# Patient Record
Sex: FEMALE | Employment: OTHER | URBAN - METROPOLITAN AREA
[De-identification: names, ages, dates, MRNs, and addresses within clinical notes are randomized per-mention and may not be internally consistent; named-entity substitution may affect disease eponyms.]

---

## 2017-01-04 ENCOUNTER — HOSPITAL ENCOUNTER (OUTPATIENT)
Dept: PHYSICAL THERAPY | Age: 67
Discharge: HOME | End: 2017-01-04
Payer: MEDICARE

## 2017-01-04 PROCEDURE — 97140 MANUAL THERAPY 1/> REGIONS: CPT

## 2017-01-04 PROCEDURE — 97110 THERAPEUTIC EXERCISES: CPT

## 2017-01-04 NOTE — PROGRESS NOTES
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 OBJECTIVE    Modality rationale: decrease pain to improve the patients ability to perform ADLs.     Min Type Additional Details    [] Estim:  []Unatt       []IFC  []Premod                        []Other:  []w/ice   []w/heat  Position:  Location:    [] Estim: []Att    []TENS instruct  []NMES                    []Other:  []w/US   []w/ice   []w/heat  Position:  Location:    []  Traction: [] Cervical       []Lumbar                       [] Prone          []Supine                       []Intermittent   []Continuous Lbs:  [] before manual  [] after manual    []  Ultrasound: []Continuous   [] Pulsed                           []1MHz   []3MHz W/cm2:  Location:    []  Iontophoresis with dexamethasone         Location: [] Take home patch   [] In clinic   10 [x]  Ice     []  heat  []  Ice massage  []  Laser   []  Anodyne Position: L SL  Location: R hip    []  Laser with stim  []  Other:  Position:  Location:    []  Vasopneumatic Device Pressure:       [] lo [] med [] hi   Temperature: [] lo [] med [] hi   [] Skin assessment post-treatment:  []intact []redness- no adverse reaction    []redness  adverse reaction:     39 min Therapeutic Exercise:  [x] See flow sheet : Rationale: increase ROM and increase strength to improve the patients ability to perform ADLs. 12 min Manual Therapy:  Per flow sheet   Rationale: decrease pain, increase ROM and increase tissue extensibility to perform ADLs. With   [] TE   [] TA   [] neuro   [] other: Patient Education: [x] Review HEP    [] Progressed/Changed HEP based on:   [] positioning   [] body mechanics   [] transfers   [] heat/ice application    [] other:      Other Objective/Functional Measures:  (-) Ely's R     Pain Level (0-10 scale) post treatment: 0/10    ASSESSMENT/Changes in Function: Cont per POC. Patient will continue to benefit from skilled PT services to modify and progress therapeutic interventions, address functional mobility deficits, address ROM deficits and address strength deficits to attain remaining goals. []  See Plan of Care  []  See progress note/recertification  []  See Discharge Summary         Progress towards goals / Updated goals:  Short Term Goals: To be accomplished in 1 weeks:  1. Pt will be independent and complaint w/ HEP to progress gains in PT. At eval: initiated HEP  Current: met,per pt report. 12/28/16   Long Term Goals: To be accomplished in 6 weeks:  1. Pt will improve FOTO to > or = to 69 to demo improved function. At eval: FOTO = 64  2. Pt will improve B Gmax MMT to > or = to 4/5 for ease w/ stability w/ workout classes. At eval: MMT B Gmax = 3/5  3. Pt will improve flexibility as demo'd by - ely's R to decrease pain w/ walking. At eval: + ely's B  Current: Goal met. 1/4/17  4. Pt will report > or = to 60% improvement in symptoms for ease w/ return to normal ADLs and gym routine.    At eval: 0%    PLAN  []  Upgrade activities as tolerated     [x]  Continue plan of care  []  Update interventions per flow sheet       []  Discharge due to:_  []  Other:_      Ernestina Caruso, ANNAMARIA 1/4/2017  7:12 AM    Future Appointments  Date Time Provider Meghna Moreno   1/6/2017 4:30 PM Alirio Cohn, PT [facility]   1/9/2017 3:30 PM Ernestina E Laws, PTA [facility]   1/10/2017 2:45 PM Eduarda Palm [place] [id number]   1/11/2017 3:30 PM Ernestina E Laws, PTA [facility]   1/17/2017 4:30 PM Ernestina E Laws, PTA [facility]   1/19/2017 5:00 PM Alirio Cohn, PT [facility]   1/23/2017 4:30 PM Ernestina E Laws, PTA [facility]   1/27/2017 12:00 PM Alirio Cohn, PT [facility]

## 2017-01-06 ENCOUNTER — HOSPITAL ENCOUNTER (OUTPATIENT)
Dept: PHYSICAL THERAPY | Age: 67
Discharge: HOME | End: 2017-01-06
Payer: MEDICARE

## 2017-01-06 PROCEDURE — 97140 MANUAL THERAPY 1/> REGIONS: CPT | Performed by: PHYSICAL THERAPIST

## 2017-01-06 PROCEDURE — 97110 THERAPEUTIC EXERCISES: CPT | Performed by: PHYSICAL THERAPIST

## 2017-01-06 NOTE — PROGRESS NOTES
PT DAILY TREATMENT NOTE - [facility]     Patient Name: Margarita Foote  Date:2017  : 1950  [x]  Patient  Verified  Payor: Angelito Parks / Plan: [place] MEDICARE PART A & B / Product Type: Medicare /    In time:433  Out time:527  Total Treatment Time (min): 54  Total Timed Codes (min): 44  1:1 Treatment Time ( [address] only): 44   Visit #: 6 of 12    Treatment Area:  Other specified enthesopathies of right lower limb, excluding foot [M76.342]    SUBJECTIVE  Pain Level (0-10 scale): 1/10  Any medication changes, allergies to medications, adverse drug reactions, diagnosis change, or new procedure performed?: [x] No    [] Yes (see summary sheet for update)  Subjective functional status/changes:   [] No changes reported  Pt reports pain is less and more of just an annoyance and it's less often    OBJECTIVE    Modality rationale: decrease pain and increase tissue extensibility to improve the patients ability to improve mobility    Min Type Additional Details    [] Estim:  []Unatt       []IFC  []Premod                        []Other:  []w/ice   []w/heat  Position:  Location:    [] Estim: []Att    []TENS instruct  []NMES                    []Other:  []w/US   []w/ice   []w/heat  Position:  Location:    []  Traction: [] Cervical       []Lumbar                       [] Prone          []Supine                       []Intermittent   []Continuous Lbs:  [] before manual  [] after manual    []  Ultrasound: []Continuous   [] Pulsed                           []1MHz   []3MHz W/cm2:  Location:    []  Iontophoresis with dexamethasone         Location: [] Take home patch   [] In clinic   10 []  Ice     [x]  heat  []  Ice massage  []  Laser   []  Anodyne Position: L s/l  Location: R hip    []  Laser with stim  []  Other:  Position:  Location:    []  Vasopneumatic Device Pressure:       [] lo [] med [] hi   Temperature: [] lo [] med [] hi   [] Skin assessment post-treatment:  []intact []redness- no adverse reaction    []redness  adverse reaction:      min []Eval                  []Re-Eval       36 min Therapeutic Exercise:  [x] See flow sheet : increased per flow sheet   Rationale: increase ROM, increase strength, improve coordination and increase proprioception to improve the patients ability to improve mobility and function      min Therapeutic Activity:  []  See flow sheet :   Rationale:   to improve the patients ability to       min Neuromuscular Re-education:  []  See flow sheet :   Rationale:   to improve the patients ability to     8 min Manual Therapy:  TPR/DTM to R hip flex, TFL, glut med and min, vastus lateralis, manual hip flex and quad str along with prone hip IR stretch    Rationale: decrease pain, increase ROM, increase tissue extensibility and decrease trigger points to improve mobility and function      min Gait Training:  ___ feet with ___ device on level surfaces with ___ level of assist   Rationale: With   [] TE   [] TA   [] neuro   [] other: Patient Education: [x] Review HEP    [] Progressed/Changed HEP based on:   [] positioning   [] body mechanics   [] transfers   [] heat/ice application    [] other:      Other Objective/Functional Measures:      Pain Level (0-10 scale) post treatment: 2/10    ASSESSMENT/Changes in Function: Pt is making good progress, continue to increased and progress to tolerance. Patient will continue to benefit from skilled PT services to modify and progress therapeutic interventions, address functional mobility deficits, address strength deficits, analyze and address soft tissue restrictions, analyze and cue movement patterns and instruct in home and community integration to attain remaining goals. []  See Plan of Care  []  See progress note/recertification  []  See Discharge Summary         Progress towards goals / Updated goals:  Short Term Goals: To be accomplished in 1 weeks:  1. Pt will be independent and complaint w/ HEP to progress gains in PT.    At eval: initiated HEP  Current: met,per pt report. [id number]   Long Term Goals: To be accomplished in 6 weeks:  1. Pt will improve FOTO to > or = to 69 to demo improved function. At eval: FOTO = 64  Current: progressing, FOTO = 66 1/6/17  2. Pt will improve B Gmax MMT to > or = to 4/5 for ease w/ stability w/ workout classes. At eval: MMT B Gmax = 3/5  3. Pt will improve flexibility as demo'd by - ely's R to decrease pain w/ walking. At eval: + ely's B  4. Pt will report > or = to 60% improvement in symptoms for ease w/ return to normal ADLs and gym routine.    At eval: 0%  Current: met, pt reports 80% improvement especially over the last 2 days 1/6/17    PLAN  []  Upgrade activities as tolerated     [x]  Continue plan of care  []  Update interventions per flow sheet       []  Discharge due to:_  []  Other:_      Campbell Hurt, PT 1/6/2017  4:45 PM    Future Appointments  Date Time Provider Meghna Moreno   1/9/2017 3:30 PM Ernestina Manriquez PTA [facility]   1/10/2017 2:45 PM Britta Teran MD [id number]   1/11/2017 3:30 PM Ernestina Caruso PTA [facility]   1/17/2017 4:30 PM Ernestina Caruso PTA [facility]   1/19/2017 5:00 PM Russell Amor, HERRERA [facility]   1/23/2017 4:30 PM Ernestina Caruso PTA [facility]   1/27/2017 12:00 PM Russell Amor, PT [facility]

## 2017-01-09 ENCOUNTER — APPOINTMENT (OUTPATIENT)
Dept: PHYSICAL THERAPY | Age: 67
End: 2017-01-09
Payer: MEDICARE

## 2017-01-17 ENCOUNTER — HOSPITAL ENCOUNTER (OUTPATIENT)
Dept: PHYSICAL THERAPY | Age: 67
Discharge: HOME | End: 2017-01-17
Payer: MEDICARE

## 2017-01-17 PROCEDURE — 97140 MANUAL THERAPY 1/> REGIONS: CPT

## 2017-01-17 PROCEDURE — 97110 THERAPEUTIC EXERCISES: CPT

## 2017-01-17 NOTE — PROGRESS NOTES
PT DAILY TREATMENT NOTE - [facility]     Patient Name: Hira Sanchez  Date:2017  : 1950  [x]  Patient  Verified  Payor: Anjelica Short / Plan: [place] MEDICARE PART A & B / Product Type: Medicare /    In time: 4:41  Out time:5:28  Total Treatment Time (min): 47  Total Timed Codes (min): 37  1:1 Treatment Time ( [address] only): 30   Visit #: 7 of 12    Treatment Area: Other specified enthesopathies of right lower limb, excluding foot [M76.890]    SUBJECTIVE  Pain Level (0-10 scale): 3/10  Any medication changes, allergies to medications, adverse drug reactions, diagnosis change, or new procedure performed?: [x] No    [] Yes (see summary sheet for update)  Subjective functional status/changes:   [] No changes reported  Pt states the hip pain she originally came in for is much better, but now she is having pain through her R lower butt cheek. OBJECTIVE    Modality rationale: decrease pain to improve the patients ability to increase ease of ADLs.     Min Type Additional Details    [] Estim:  []Unatt       []IFC  []Premod                        []Other:  []w/ice   []w/heat  Position:  Location:    [] Estim: []Att    []TENS instruct  []NMES                    []Other:  []w/US   []w/ice   []w/heat  Position:  Location:    []  Traction: [] Cervical       []Lumbar                       [] Prone          []Supine                       []Intermittent   []Continuous Lbs:  [] before manual  [] after manual    []  Ultrasound: []Continuous   [] Pulsed                           []1MHz   []3MHz W/cm2:  Location:    []  Iontophoresis with dexamethasone         Location: [] Take home patch   [] In clinic   10 [x]  Ice     []  heat  []  Ice massage  []  Laser   []  Anodyne Position: prone  Location: R glut    []  Laser with stim  []  Other:  Position:  Location:    []  Vasopneumatic Device Pressure:       [] lo [] med [] hi   Temperature: [] lo [] med [] hi   [] Skin assessment post-treatment:  []intact []redness- no adverse reaction    []redness  adverse reaction:     27 min Therapeutic Exercise:  [x] See flow sheet :   Rationale: increase ROM and increase strength to improve the patients ability to perform ADLs. 10 min Manual Therapy:  Hip flex S, STM to R glut   Rationale: decrease pain, increase ROM, increase tissue extensibility and decrease trigger points to increase ease of ADLs. With   [] TE   [] TA   [] neuro   [] other: Patient Education: [x] Review HEP    [] Progressed/Changed HEP based on:   [] positioning   [] body mechanics   [] transfers   [] heat/ice application    [] other:      Other Objective/Functional Measures: (-) Ely's B.  B Glut max MMT 4-/5. Pain Level (0-10 scale) post treatment: 2/10    ASSESSMENT/Changes in Function: Cont per POC    Patient will continue to benefit from skilled PT services to modify and progress therapeutic interventions, address functional mobility deficits, address ROM deficits and address strength deficits to attain remaining goals. []  See Plan of Care  []  See progress note/recertification  []  See Discharge Summary         Progress towards goals / Updated goals:  Short Term Goals: To be accomplished in 1 weeks:  1. Pt will be independent and complaint w/ HEP to progress gains in PT. At eval: initiated HEP  Current: met,per pt report. [id number]   Long Term Goals: To be accomplished in 6 weeks:  1. Pt will improve FOTO to > or = to 69 to demo improved function. At eval: FOTO = 64  Current: progressing, FOTO = 66 1/6/17  2. Pt will improve B Gmax MMT to > or = to 4/5 for ease w/ stability w/ workout classes. At eval: MMT B Gmax = 3/5  Current: Progressing, 4-/5 B 1/17/17  3. Pt will improve flexibility as demo'd by - ely's R to decrease pain w/ walking. At eval: + ely's B  Current: Goal met,  (-) B.  1/17/17  4. Pt will report > or = to 60% improvement in symptoms for ease w/ return to normal ADLs and gym routine.    At eval: 0%  Current: met, pt reports 80% improvement especially over the last 2 days 1/6/17    PLAN  []  Upgrade activities as tolerated     [x]  Continue plan of care  []  Update interventions per flow sheet       []  Discharge due to:_  []  Other:_      Ernestina Caruso PTA 1/17/2017  5:23 PM    Future Appointments  Date Time Provider Meghna Moreno   1/19/2017 5:00 PM Jed Moctezuma, PT [facility]   1/23/2017 4:30 PM Ernestina Caruso PTA [facility]   1/27/2017 12:00 PM Jed Moctezuma, PT [facility]

## 2017-01-19 ENCOUNTER — HOSPITAL ENCOUNTER (OUTPATIENT)
Dept: PHYSICAL THERAPY | Age: 67
Discharge: HOME | End: 2017-01-19
Payer: MEDICARE

## 2017-01-19 PROCEDURE — 97140 MANUAL THERAPY 1/> REGIONS: CPT

## 2017-01-19 PROCEDURE — 97110 THERAPEUTIC EXERCISES: CPT

## 2017-01-19 PROCEDURE — G8978 MOBILITY CURRENT STATUS: HCPCS

## 2017-01-19 PROCEDURE — G8979 MOBILITY GOAL STATUS: HCPCS

## 2017-01-19 NOTE — PROGRESS NOTES
PT DAILY TREATMENT NOTE - [facility]     Patient Name: Elizabeth Mccloud  Date:2017  : 1950  [x]  Patient  Verified  Payor: Belen Last / Plan: [place] MEDICARE PART A & B / Product Type: Medicare /    In time:5:10  Out time:6:05  Total Treatment Time (min): 55  Total Timed Codes (min): 45  1:1 Treatment Time ([facility] only): 38   Visit #: 8 of 12    Treatment Area: Other specified enthesopathies of right lower limb, excluding foot [M76.891]    SUBJECTIVE  Pain Level (0-10 scale): 1.5  Any medication changes, allergies to medications, adverse drug reactions, diagnosis change, or new procedure performed?: [x] No    [] Yes (see summary sheet for update)  Subjective functional status/changes:   [] No changes reported  Pt reports 70% overall improvement.      OBJECTIVE    Modality rationale: decrease inflammation and decrease pain to improve the patients ability to perform ADL's   Min Type Additional Details    [] Estim:  []Unatt       []IFC  []Premod                        []Other:  []w/ice   []w/heat  Position:  Location:    [] Estim: []Att    []TENS instruct  []NMES                    []Other:  []w/US   []w/ice   []w/heat  Position:  Location:    []  Traction: [] Cervical       []Lumbar                       [] Prone          []Supine                       []Intermittent   []Continuous Lbs:  [] before manual  [] after manual    []  Ultrasound: []Continuous   [] Pulsed                           []1MHz   []3MHz W/cm2:  Location:    []  Iontophoresis with dexamethasone         Location: [] Take home patch   [] In clinic   10 [x]  Ice     []  heat  []  Ice massage  []  Laser   []  Anodyne Position: L S/L  Location: R hip    []  Laser with stim  []  Other:  Position:  Location:    []  Vasopneumatic Device Pressure:       [] lo [] med [] hi   Temperature: [] lo [] med [] hi   [] Skin assessment post-treatment:  []intact []redness- no adverse reaction    []redness  adverse reaction:      min []Eval []Re-Eval       35 min Therapeutic Exercise:  [x] See flow sheet :   Rationale: increase ROM, increase strength, improve coordination and improve balance to improve the patients ability to decrease pain and improve activity tolerance      min Therapeutic Activity:  []  See flow sheet :   Rationale:   to improve the patients ability to       min Neuromuscular Re-education:  []  See flow sheet :   Rationale:   to improve the patients ability to     10 min Manual Therapy:  Per flow sheet   Rationale: decrease pain, increase ROM, increase tissue extensibility and decrease trigger points to increase ease with ADL's     min Gait Training:  ___ feet with ___ device on level surfaces with ___ level of assist   Rationale: With   [] TE   [] TA   [] neuro   [] other: Patient Education: [x] Review HEP    [] Progressed/Changed HEP based on:   [] positioning   [] body mechanics   [] transfers   [] heat/ice application    [] other:      Other Objective/Functional Measures: see goals below     Pain Level (0-10 scale) post treatment: 1.5    ASSESSMENT/Changes in Function: cont per POC    Patient will continue to benefit from skilled PT services to modify and progress therapeutic interventions, address functional mobility deficits, address ROM deficits, address strength deficits, analyze and address soft tissue restrictions, analyze and cue movement patterns and instruct in home and community integration to attain remaining goals. []  See Plan of Care  []  See progress note/recertification  []  See Discharge Summary         Progress towards goals / Updated goals:  Short Term Goals: To be accomplished in 1 weeks:  1. Pt will be independent and complaint w/ HEP to progress gains in PT. At eval: initiated HEP  Current: met,per pt report. 12/27/16   Long Term Goals: To be accomplished in 6 weeks:  1. Pt will improve FOTO to > or = to 69 to demo improved function.    At eval: FOTO = 64  Current: progressing, FOTO = 66 1/6/17  2. Pt will improve B Gmax MMT to > or = to 4/5 for ease w/ stability w/ workout classes. At eval: MMT B Gmax = 3/5  Current: Progressing, 4-/5 B 1/17/17  3. Pt will improve flexibility as demo'd by - ely's R to decrease pain w/ walking. At eval: + ely's B  Current: Goal met, (-) B. 1/17/17  4. Pt will report > or = to 60% improvement in symptoms for ease w/ return to normal ADLs and gym routine.    At eval: 0%  Current: met, pt reports 70% improvement overall (1/19/17)       PLAN  [x]  Upgrade activities as tolerated     [x]  Continue plan of care  []  Update interventions per flow sheet       []  Discharge due to:_  []  Other:_      Mamadou Lockwood, PT 1/19/2017  5:38 PM    Future Appointments  Date Time Provider Meghna Moreno   1/23/2017 4:30 PM Yolanda Walker PTA [facility]   1/27/2017 12:00 PM Mamadou Lockwood PT [facility]

## 2017-01-19 NOTE — PROGRESS NOTES
[facility]              [address]        [place], [address] [place]   [phone] fax    Medicare Progress Report    Patient name: Macho Jolley Start of Care: 2016   Referral source: Odessa Goodpasture, MD : 1950   Medical/Treatment Diagnosis: Other specified enthesopathies of right lower limb, excluding foot [M76.891] Onset Date:~2 months prior to I.E. Prior Hospitalization: see medical history Provider#: [id number]   Medications: Verified on Patient Summary List    Comorbidities: allergies, arthritis, back pain, GI disease, osteoporosis, prior surgery  Prior Level of Function: Independent w/ ADLs and active with gym program   Visits from Start of Care: 8    Missed Visits: 1    Reporting Period: 2016 to 2017    Subjective Reports: Pt reports 70% improvement overall    Key functional changes:  Progress towards goals / Updated goals:  Short Term Goals: To be accomplished in 1 weeks:  1. Pt will be independent and complaint w/ HEP to progress gains in PT. At eval: initiated HEP  Current: met,per pt report. Long Term Goals: To be accomplished in 6 weeks:  1. Pt will improve FOTO to > or = to 69 to demo improved function. At eval: FOTO = 64  Current: progressing, FOTO = 66   2. Pt will improve B Gmax MMT to > or = to 4/5 for ease w/ stability w/ workout classes. At eval: MMT B Gmax = 3/5  Current: Progressing, 4-/5 B   3. Pt will improve flexibility as demo'd by - ely's R to decrease pain w/ walking. At eval: + ely's B  Current: Goal met, (-) B.   4. Pt will report > or = to 60% improvement in symptoms for ease w/ return to normal ADLs and gym routine. At eval: 0%  Current: met, pt reports 70% improvement overall      Pt has progressed well with PT, reporting 70% improvement since [facility]. Pt has demonstrated improvements with B hip MMT and flexibility.        Assessment / Recommendations: Patient will continue to benefit from skilled PT services to modify and progress therapeutic interventions, address functional mobility deficits, address ROM deficits, address strength deficits, analyze and address soft tissue restrictions, analyze and cue movement patterns and instruct in home and community integration to attain remaining goals. Problem List: pain affecting function, decrease ROM, decrease strength, impaired gait/ balance, decrease ADL/ functional abilitiies, decrease activity tolerance and decrease flexibility/ joint mobility   Treatment Plan: Therapeutic exercise, Therapeutic activities, Neuromuscular re-education, Physical agent/modality, Gait/balance training, Manual therapy and Patient education    Updated Goals to be accomplished in 3 weeks:  Continue with above unmet goals    Frequency / Duration: Patient to be seen 2 times per week for 3 weeks:    G-Codes (GP)  Mobility  [id number] Current  CJ= 20-39%   Goal  CJ= 20-39%    The severity rating is based on clinical judgment and the FOTO score.       Manish Thurston, PT 1/19/2017 5:57 PM

## 2017-01-23 ENCOUNTER — HOSPITAL ENCOUNTER (OUTPATIENT)
Dept: PHYSICAL THERAPY | Age: 67
Discharge: HOME | End: 2017-01-23
Payer: MEDICARE

## 2017-01-23 PROCEDURE — 97110 THERAPEUTIC EXERCISES: CPT

## 2017-01-23 PROCEDURE — 97140 MANUAL THERAPY 1/> REGIONS: CPT

## 2017-01-23 NOTE — PROGRESS NOTES
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 OBJECTIVE    Modality rationale: decrease pain to improve the patients ability to perform ADLs.     Min Type Additional Details    [] Estim:  []Unatt       []IFC  []Premod                        []Other:  []w/ice   []w/heat  Position:  Location:    [] Estim: []Att    []TENS instruct  []NMES                    []Other:  []w/US   []w/ice   []w/heat  Position:  Location:    []  Traction: [] Cervical       []Lumbar                       [] Prone          []Supine                       []Intermittent   []Continuous Lbs:  [] before manual  [] after manual    []  Ultrasound: []Continuous   [] Pulsed                           []1MHz   []3MHz Location:  W/cm2:    []  Iontophoresis with dexamethasone         Location: [] Take home patch   [] In clinic   10 [x]  Ice     []  heat  []  Ice massage  []  Laser   []  Anodyne Position: supine  Location: R hip    []  Laser with stim  []  Other: Position:  Location:    []  Vasopneumatic Device Pressure:       [] lo [] med [] hi   Temperature: [] lo [] med [] hi   [] Skin assessment post-treatment:  []intact []redness- no adverse reaction    []redness  adverse reaction:     37 min Therapeutic Exercise:  [x] See flow sheet :   Rationale: increase ROM and increase strength to improve the patients ability to perform ADLs. 8 min Manual Therapy:  Per flow sheet   Rationale: decrease pain, increase ROM and increase tissue extensibility to increase ease of ADLs. With   [] TE   [] TA   [] neuro   [] other: Patient Education: [x] Review HEP    [] Progressed/Changed HEP based on:   [] positioning   [] body mechanics   [] transfers   [] heat/ice application    [] other:      Other Objective/Functional Measures: No TTP of gluts, piriformis or QL. Pain Level (0-10 scale) post treatment: 0/10    ASSESSMENT/Changes in Function: Cont per POC. Patient will continue to benefit from skilled PT services to modify and progress therapeutic interventions, address functional mobility deficits, address ROM deficits and address strength deficits to attain remaining goals. []  See Plan of Care  []  See progress note/recertification  []  See Discharge Summary         Progress towards goals / Updated goals:  Long Term Goals: To be accomplished in 6 weeks:  1. Pt will improve FOTO to > or = to 69 to demo improved function. At PN[de-identified] progressing, FOTO = 66 1/6/17  2. Pt will improve B Gmax MMT to > or = to 4/5 for ease w/ stability w/ workout classes.    At PN: Progressing, 4-/5 B 1/17/17      PLAN  []  Upgrade activities as tolerated     [x]  Continue plan of care  []  Update interventions per flow sheet       []  Discharge due to:_  []  Other:_      Ernestina Caruso, PTA 1/23/2017  5:22 PM

## 2017-01-27 ENCOUNTER — HOSPITAL ENCOUNTER (OUTPATIENT)
Dept: PHYSICAL THERAPY | Age: 67
Discharge: HOME | End: 2017-01-27
Payer: MEDICARE

## 2017-01-27 PROCEDURE — 97140 MANUAL THERAPY 1/> REGIONS: CPT

## 2017-01-27 PROCEDURE — 97110 THERAPEUTIC EXERCISES: CPT

## 2017-01-27 NOTE — PROGRESS NOTES
PT DAILY TREATMENT NOTE - [facility]     Patient Name: Marva Cuevas  Date:2017  : 1950  [x]  Patient  Verified  Payor: Jinny Lanier / Plan: [place] MEDICARE PART A & B / Product Type: Medicare /    In time: 4:30  Out time:5:22  Total Treatment Time (min): 52  Total Timed Codes (min): 42  1:1 Treatment Time ( [address] only): 42   Visit #: 2 of 6    Treatment Area: Other specified enthesopathies of right lower limb, excluding foot [M76.075]    SUBJECTIVE  Pain Level (0-10 scale): 0  Any medication changes, allergies to medications, adverse drug reactions, diagnosis change, or new procedure performed?: [x] No    [] Yes (see summary sheet for update)  Subjective functional status/changes:   [] No changes reported  Pt reports no current pain today.      OBJECTIVE    Modality rationale: decrease inflammation and decrease pain to improve the patients ability to perform ADL's   Min Type Additional Details    [] Estim:  []Unatt       []IFC  []Premod                        []Other:  []w/ice   []w/heat  Position:  Location:    [] Estim: []Att    []TENS instruct  []NMES                    []Other:  []w/US   []w/ice   []w/heat  Position:  Location:    []  Traction: [] Cervical       []Lumbar                       [] Prone          []Supine                       []Intermittent   []Continuous Lbs:  [] before manual  [] after manual    []  Ultrasound: []Continuous   [] Pulsed                           []1MHz   []3MHz W/cm2:  Location:    []  Iontophoresis with dexamethasone         Location: [] Take home patch   [] In clinic   10 [x]  Ice     []  heat  []  Ice massage  []  Laser   []  Anodyne Position: L S/L  Location: R hip    []  Laser with stim  []  Other:  Position:  Location:    []  Vasopneumatic Device Pressure:       [] lo [] med [] hi   Temperature: [] lo [] med [] hi   [] Skin assessment post-treatment:  []intact []redness- no adverse reaction    []redness  adverse reaction:      min []Eval                  []Re-Eval 32 min Therapeutic Exercise:  [x] See flow sheet :   Rationale: increase ROM, increase strength and improve coordination to improve the patients ability to decrease pain and improve activity tolerance      min Therapeutic Activity:  []  See flow sheet :   Rationale:   to improve the patients ability to       min Neuromuscular Re-education:  []  See flow sheet :   Rationale:   to improve the patients ability to     10 min Manual Therapy:  Per flow sheet   Rationale: decrease pain, increase ROM, increase tissue extensibility and decrease trigger points to increase ease with ADL's      min Gait Training:  ___ feet with ___ device on level surfaces with ___ level of assist   Rationale: With   [] TE   [] TA   [] neuro   [] other: Patient Education: [x] Review HEP    [] Progressed/Changed HEP based on:   [] positioning   [] body mechanics   [] transfers   [] heat/ice application    [] other:      Other Objective/Functional Measures: glute max 4-/5 B     Pain Level (0-10 scale) post treatment: 1    ASSESSMENT/Changes in Function: cont per POC    Patient will continue to benefit from skilled PT services to modify and progress therapeutic interventions, address functional mobility deficits, address strength deficits, analyze and address soft tissue restrictions, analyze and cue movement patterns and instruct in home and community integration to attain remaining goals. []  See Plan of Care  []  See progress note/recertification  []  See Discharge Summary         Progress towards goals / Updated goals:  Long Term Goals: To be accomplished in 6 weeks:  1. Pt will improve FOTO to > or = to 69 to demo improved function. At PN[de-identified] progressing, FOTO = 66 1/6/17  2. Pt will improve B Gmax MMT to > or = to 4/5 for ease w/ stability w/ workout classes.    At PN: Progressing, 4-/5 B 1/17/17  Current: same as above (1/27/17)    PLAN  [x]  Upgrade activities as tolerated     [x]  Continue plan of care  []  Update interventions per flow sheet       []  Discharge due to:_  []  Other:_      Camelia Swenson, PT 1/27/2017  4:33 PM    No future appointments.

## 2017-02-02 ENCOUNTER — HOSPITAL ENCOUNTER (OUTPATIENT)
Dept: PHYSICAL THERAPY | Age: 67
Discharge: HOME | End: 2017-02-02
Payer: MEDICARE

## 2017-02-02 PROCEDURE — 97140 MANUAL THERAPY 1/> REGIONS: CPT

## 2017-02-02 PROCEDURE — 97110 THERAPEUTIC EXERCISES: CPT

## 2017-02-02 NOTE — PROGRESS NOTES
PT DAILY TREATMENT NOTE - [facility]     Patient Name: Jose Alvarado  Date:2017  : 1950  [x]  Patient  Verified  Payor: [place] MEDICARE / Plan: [place] MEDICARE PART A & B / Product Type: Medicare /    In time:4:07  Out time:5:06  Total Treatment Time (min): 59  Total Timed Codes (min): 49  1:1 Treatment Time ( [address] only): 23   Visit #: 3 of 6    Treatment Area: Other specified enthesopathies of right lower limb, excluding foot [M76.348]    SUBJECTIVE  Pain Level (0-10 scale): 1  Any medication changes, allergies to medications, adverse drug reactions, diagnosis change, or new procedure performed?: [x] No    [] Yes (see summary sheet for update)  Subjective functional status/changes:   [] No changes reported  Pt reports she gets pain in her R hip when she lays on her left side. OBJECTIVE    Modality rationale: decrease pain to improve the patients ability to decrease difficulty while performing tasks.     Min Type Additional Details    [] Estim:  []Unatt       []IFC  []Premod                        []Other:  []w/ice   []w/heat  Position:  Location:    [] Estim: []Att    []TENS instruct  []NMES                    []Other:  []w/US   []w/ice   []w/heat  Position:  Location:    []  Traction: [] Cervical       []Lumbar                       [] Prone          []Supine                       []Intermittent   []Continuous Lbs:  [] before manual  [] after manual    []  Ultrasound: []Continuous   [] Pulsed                           []1MHz   []3MHz W/cm2:  Location:    []  Iontophoresis with dexamethasone         Location: [] Take home patch   [] In clinic   10 []  Ice     [x]  heat  []  Ice massage  []  Laser   []  Anodyne Position:sidelying  Location:R hip    []  Laser with stim  []  Other:  Position:  Location:    []  Vasopneumatic Device Pressure:       [] lo [] med [] hi   Temperature: [] lo [] med [] hi   [] Skin assessment post-treatment:  []intact []redness- no adverse reaction    []redness  adverse reaction: 39 min Therapeutic Exercise:  [x] See flow sheet :   Rationale: increase ROM and increase strength to improve the patients ability to increase tolerance to activities. 10 min Manual Therapy:  Per flow sheet   Rationale: decrease pain, increase ROM, increase tissue extensibility and decrease trigger points to increase ease with ADLs. With   [] TE   [] TA   [] neuro   [] other: Patient Education: [x] Review HEP    [] Progressed/Changed HEP based on:   [] positioning   [] body mechanics   [] transfers   [] heat/ice application    [] other:      Other Objective/Functional Measures: MT glue 4-/5 B     Pain Level (0-10 scale) post treatment: 1    ASSESSMENT/Changes in Function: Continue per POC. Patient will continue to benefit from skilled PT services to modify and progress therapeutic interventions, address functional mobility deficits, address ROM deficits, address strength deficits and analyze and address soft tissue restrictions to attain remaining goals. []  See Plan of Care  []  See progress note/recertification  []  See Discharge Summary         Progress towards goals / Updated goals:  Long Term Goals: To be accomplished in 6 weeks:  1. Pt will improve FOTO to > or = to 69 to demo improved function. At PN[de-identified] progressing, FOTO = 66 1/6/17  2. Pt will improve B Gmax MMT to > or = to 4/5 for ease w/ stability w/ workout classes.    At PN: Progressing, 4-/5 B 1/17/17  Current: same as above (2/2/17)    PLAN  []  Upgrade activities as tolerated     [x]  Continue plan of care  []  Update interventions per flow sheet       []  Discharge due to:_  []  Other:_      Mark Lane PTA 2/2/2017  4:26 PM    Future Appointments  Date Time Provider Meghna Moreno   2/9/2017 4:30 PM Jed Moctezuma, PT [facility]

## 2017-02-09 ENCOUNTER — APPOINTMENT (OUTPATIENT)
Dept: PHYSICAL THERAPY | Age: 67
End: 2017-02-09
Payer: MEDICARE

## 2017-02-15 ENCOUNTER — HOSPITAL ENCOUNTER (OUTPATIENT)
Dept: PHYSICAL THERAPY | Age: 67
Discharge: HOME | End: 2017-02-15
Payer: MEDICARE

## 2017-02-15 PROCEDURE — G8980 MOBILITY D/C STATUS: HCPCS | Performed by: PHYSICAL THERAPIST

## 2017-02-15 PROCEDURE — 97110 THERAPEUTIC EXERCISES: CPT

## 2017-02-15 PROCEDURE — 97140 MANUAL THERAPY 1/> REGIONS: CPT

## 2017-02-15 PROCEDURE — G8979 MOBILITY GOAL STATUS: HCPCS | Performed by: PHYSICAL THERAPIST

## 2017-02-15 NOTE — PROGRESS NOTES
PT DAILY TREATMENT NOTE - [facility]     Patient Name: Raynelle Boast  Date:2/15/2017  : 1950  [x]  Patient  Verified  Payor: Mariola Cabezas / Plan: [place] MEDICARE PART A & B / Product Type: Medicare /    In time:5:14  Out time:6:09  Total Treatment Time (min): 55  Total Timed Codes (min): 45  1:1 Treatment Time ( [address] only): 38   Visit #: 4 of 6    Treatment Area: Other specified enthesopathies of right lower limb, excluding foot [M76.895]    SUBJECTIVE  Pain Level (0-10 scale): 0  Any medication changes, allergies to medications, adverse drug reactions, diagnosis change, or new procedure performed?: [x] No    [] Yes (see summary sheet for update)  Subjective functional status/changes:   [] No changes reported  Pt reports she is back able to perform her normal exercise classes without much pain in her hip. OBJECTIVE    Modality rationale: decrease pain to improve the patients ability to decrease difficulty while performing tasks.     Min Type Additional Details    [] Estim:  []Unatt       []IFC  []Premod                        []Other:  []w/ice   []w/heat  Position:  Location:    [] Estim: []Att    []TENS instruct  []NMES                    []Other:  []w/US   []w/ice   []w/heat  Position:  Location:    []  Traction: [] Cervical       []Lumbar                       [] Prone          []Supine                       []Intermittent   []Continuous Lbs:  [] before manual  [] after manual    []  Ultrasound: []Continuous   [] Pulsed                           []1MHz   []3MHz W/cm2:  Location:    []  Iontophoresis with dexamethasone         Location: [] Take home patch   [] In clinic   10 [x]  Ice     []  heat  []  Ice massage  []  Laser   []  Anodyne Position:supine  Location:R hip    []  Laser with stim  []  Other:  Position:  Location:    []  Vasopneumatic Device Pressure:       [] lo [] med [] hi   Temperature: [] lo [] med [] hi   [] Skin assessment post-treatment:  []intact []redness- no adverse reaction []redness  adverse reaction:       37 min Therapeutic Exercise:  [x] See flow sheet :   Rationale: increase ROM and increase strength to improve the patients ability to increase tolerance to activities. 8 min Manual Therapy:  Per flow sheet   Rationale: decrease pain, increase ROM, increase tissue extensibility and decrease trigger points to increase ease with ADLs. With   [] TE   [] TA   [] neuro   [] other: Patient Education: [x] Review HEP    [] Progressed/Changed HEP based on:   [] positioning   [] body mechanics   [] transfers   [] heat/ice application    [] other:      Other Objective/Functional Measures: see goals. Pain Level (0-10 scale) post treatment: 0    ASSESSMENT/Changes in Function: Pt reports 85% improvement since [facility]. Pt has a FOTO score of 68, increased by 4 since [facility]. Pt B Gmax MMT is 4/5.          []  See Plan of Care  []  See progress note/recertification  [x]  See Discharge Summary         Progress towards goals / Updated goals:  Long Term Goals: To be accomplished in 6 weeks:  1. Pt will improve FOTO to > or = to 69 to demo improved function. At PN[de-identified] progressing, FOTO = 66 1/6/17  Current: Progressing: FOTO = 68, increased by 4 since [facility]. 2/15/17  2. Pt will improve B Gmax MMT to > or = to 4/5 for ease w/ stability w/ workout classes. At PN: Progressing, 4-/5 B 1/17/17  Current: Goal Met: Gmax B 4/5 2/15/17       PLAN  []  Upgrade activities as tolerated     []  Continue plan of care  []  Update interventions per flow sheet       [x]  Discharge due to:_Pt is able to perform HEP independently. []  Other:_      Caren Jalloh, PTA 2/15/2017  5:22 PM    No future appointments.

## 2017-02-16 NOTE — PROGRESS NOTES
[facility]              [address], [address] [place]   [phone] fax    Discharge Summary  Patient name: Dipika Carlson Start of Care: 16   Referral source: Ene Crain MD : 1950   Medical/Treatment Diagnosis: Other specified enthesopathies of right lower limb, excluding foot [M76.891] Onset Date: :~2 months prior to I.E. Prior Hospitalization: see medical history Provider#: [id number]   Medications: Verified on Patient Summary List    Comorbidities: allergies, arthritis, back pain, GI disease, osteoporosis, prior surgery  Prior Level of Function: Independent w/ ADLs and active with gym program   Visits from Start of Care: 12    Missed Visits: 1  Reporting Period : 16 to 2/15/17    Summary of Care:  Progress towards goals / Updated goals:  Long Term Goals: To be accomplished in 6 weeks:  1. Pt will improve FOTO to > or = to 69 to demo improved function. At PN[de-identified] progressing, FOTO = 66   Current: Progressing: FOTO = 68, increased by 4 since [facility]  2. Pt will improve B Gmax MMT to > or = to 4/5 for ease w/ stability w/ workout classes. At PN: Progressing, 4-/5 B   Current: Goal Met: Gmax B 4/5     Pt reports 85% improvement since [facility]. Pt has a FOTO score of 68, increased by 4 since [facility]. Pt B Gmax MMT is 4/5. Pt reports she is back to her normal gym program without much pain in her hip. DC to [facility] at this time. G-Codes (GP)  Mobility   [id number] Goal  CJ= 20-39%  D/C  CJ= 20-39%    The severity rating is based on clinical judgment and the FOTO Score score.     ASSESSMENT/RECOMMENDATIONS:  [x]Discontinue therapy: [x]Patient has reached or is progressing toward set goals      []Patient is non-compliant or has abdicated      []Due to lack of appreciable progress towards set Chad Salazar PT 2017 10:05 AM

## 2017-09-12 ENCOUNTER — OFFICE VISIT (OUTPATIENT)
Dept: ORTHOPEDIC SURGERY | Age: 67
End: 2017-09-12

## 2017-09-12 VITALS
HEART RATE: 77 BPM | DIASTOLIC BLOOD PRESSURE: 81 MMHG | OXYGEN SATURATION: 97 % | BODY MASS INDEX: 23.92 KG/M2 | SYSTOLIC BLOOD PRESSURE: 129 MMHG | HEIGHT: 63 IN | TEMPERATURE: 98.4 F | WEIGHT: 135 LBS

## 2017-09-12 DIAGNOSIS — M25.571 ACUTE RIGHT ANKLE PAIN: Primary | ICD-10-CM

## 2017-09-12 DIAGNOSIS — M79.671 RIGHT FOOT PAIN: ICD-10-CM

## 2017-09-12 NOTE — MR AVS SNAPSHOT
Visit Information Date & Time Provider Department Dept. Phone Encounter #  
 9/12/2017  2:00 PM Gayla Sharma, [address] [facility] [phone] [id number] Upcoming Health Maintenance Date Due Hepatitis C Screening 1950 DTaP/Tdap/Td series (1 - Tdap) 5/24/1972 BREAST CANCER SCRN MAMMOGRAM 5/24/2001 FOBT Q 1 YEAR AGE 50-75 5/24/2001 ZOSTER VACCINE AGE 60> 3/24/2011 GLAUCOMA SCREENING Q2Y 5/24/2016 OSTEOPOROSIS SCREENING (DEXA) 5/24/2016 MEDICARE YEARLY EXAM 5/24/2016 INFLUENZA AGE 9 TO ADULT 8/1/2017 Pneumococcal 65+ Low/Medium Risk (2 of 2 - PPSV23) 1/10/2018 Allergies as of 9/12/2017  Review Complete On: 9/12/2017 By: Gayla Sharma MD  
  
 Severity Noted Reaction Type Reactions Penicillin G  09/18/2015    Rash Current Immunizations  Never Reviewed No immunizations on file. Not reviewed this visit You Were Diagnosed With   
  
 Codes Comments Right foot pain    -  Primary ICD-10-CM: Y38.016 ICD-9-CM: 729.5 Acute right ankle pain     ICD-10-CM: M25.571 ICD-9-CM: 719.47, 338.19 Vitals BP Pulse Temp Height(growth percentile) Weight(growth percentile) SpO2  
 129/81 77 98.4 °F (36.9 °C) (Oral) 5' 3\" (1.6 m) 135 lb (61.2 kg) 97% BMI OB Status Smoking Status 23.91 kg/m2 Hysterectomy Never Smoker BMI and BSA Data Body Mass Index Body Surface Area  
 23.91 kg/m 2 1.65 m 2 Preferred Pharmacy Pharmacy Name Phone [facility] [address] [place] [address] Avenue [phone] Your Updated Medication List  
  
   
This list is accurate as of: 9/12/17  2:57 PM.  Always use your most recent med list.  
  
  
  
  
 alum-mag hydroxide-simeth 200-200-20 mg/5 mL Susp Commonly known as:  MYLANTA Take 30 mL by mouth every four (4) hours as needed. calcium carbonate 200 mg calcium (500 mg) Chew Commonly known as:  TUMS Take 1 Tab by mouth daily. CLARITIN 10 mg tablet Generic drug:  loratadine Take 10 mg by mouth daily. ZANTAC 150 mg tablet Generic drug:  raNITIdine Take 150 mg by mouth two (2) times a day. We Performed the Following AMB POC XRAY, FOOT; COMPLETE, 3+ VIEW [70453 CPT(R)] POC XRAY, ANKLE; 2 VIEWS [03943 CPT(R)] Patient Instructions Please follow up after MRI. You are advised to contact us if your condition worsens. An MRI or CT has been ordered for you. A [employer] will be contacting you to schedule the appointment as soon as it has been approved with your insurance company. Please schedule an appointment to follow up with the doctor or the physicians assistant after the MRI or CT has been conducted. Ankle: Exercises Your Care Instructions Here are some examples of exercises for your ankle. Start each exercise slowly. Ease off the exercise if you start to have pain. Your doctor or physical therapist will tell you when you can start these exercises and which ones will work best for you. How to do the exercises \"Alphabet\" exercise 1. Trace the alphabet with your toe. This helps your ankle move in all directions. Side-to-side knee swing exercise 1. Sit in a chair with your foot flat on the floor. 2. Slowly move your knee from side to side while keeping your foot pressed flat. 3. Continue this exercise for 2 to 3 minutes. Towel curl 1. While sitting, place your foot on a towel on the floor and scrunch the towel toward you with your toes. 2. Then use your toes to push the towel away from you. 3. Make this exercise more challenging by placing a weighted object, such as a soup can, on the other end of the towel. Towel stretch 1. Sit with your legs extended and knees straight. 2. Place a towel around your foot just under the toes. 3. Hold each end of the towel in each hand, with your hands above your knees. 4. Pull back with the towel so that your foot stretches toward you. 5. Hold the position for at least 15 to 30 seconds. 6. Repeat 2 to 4 times a session, up to 5 sessions a day. Ankle eversion exercise 1. Start by sitting with your foot flat on the floor and pushing it outward against an immovable object such as the wall or heavy furniture. Hold for about 6 seconds, then relax. Repeat 8 to 12 times. 2. After you feel comfortable with this, try using rubber tubing looped around the outside of your feet for resistance. Push your foot out to the side against the tubing, and then count to 10 as you slowly bring your foot back to the middle. Repeat 8 to 12 times. Isometric opposition exercises 1. While sitting, put your feet together flat on the floor. 2. Press your injured foot inward against your other foot. Hold for about 6 seconds, and relax. Repeat 8 to 12 times. 3. Then place the heel of your other foot on top of the injured one. Push down with the top heel while trying to push up with your injured foot. Hold for about 6 seconds, and relax. Repeat 8 to 12 times. Follow-up care is a key part of your treatment and safety. Be sure to make and go to all appointments, and call your doctor if you are having problems. It's also a good idea to know your test results and keep a list of the medicines you take. Where can you learn more? Go to http://kayla-narayan.info/. Enter Y211 in the search box to learn more about \"Ankle: Exercises. \" Current as of: March 21, 2017 Content Version: 11.3 © 7244-7794 [employer], [employer]. Care instructions adapted under license by [employer] (which disclaims liability or warranty for this information). If you have questions about a medical condition or this instruction, always ask your healthcare professional. [place] [address] any warranty or liability for your use of this information. Introducing [facility] SERVICES! Argelia Mcneil introduces Agora Mobile patient portal. Now you can access parts of your medical record, email your doctor's office, and request medication refills online. 1. In your internet browser, go to [email or web address] 2. Click on the First Time User? Click Here link in the Sign In box. You will see the New Member Sign Up page. 3. Enter your Agora Mobile Access Code exactly as it appears below. You will not need to use this code after youve completed the sign-up process. If you do not sign up before the expiration date, you must request a new code. · Agora Mobile Access Code: [email or web address] Expires: 12/11/2017  2:57 PM 
 
4. Enter the last four digits of your Social Security Number (xxxx) and Date of Birth (mm/dd/yyyy) as indicated and click Submit. You will be taken to the next sign-up page. 5. Create a Agora Mobile ID. This will be your Agora Mobile login ID and cannot be changed, so think of one that is secure and easy to remember. 6. Create a Agora Mobile password. You can change your password at any time. 7. Enter your Password Reset Question and Answer. This can be used at a later time if you forget your password. 8. Enter your e-mail address. You will receive e-mail notification when new information is available in [address] 9. Click Sign Up. You can now view and download portions of your medical record. 10. Click the Download Summary menu link to download a portable copy of your medical information. If you have questions, please visit the Frequently Asked Questions section of the Agora Mobile website. Remember, [employer] Mobile is NOT to be used for urgent needs. For medical emergencies, dial 911. Now available from your iPhone and Android! Please provide this summary of care documentation to your next provider. Your primary care clinician is listed as Robert Benitez. If you have any questions after today's visit, please call [phone].

## 2017-09-12 NOTE — PATIENT INSTRUCTIONS
Please follow up after MRI. You are advised to contact us if your condition worsens. An MRI or CT has been ordered for you. A [employer] will be contacting you to schedule the appointment as soon as it has been approved with your insurance company. Please schedule an appointment to follow up with the doctor or the physicians assistant after the MRI or CT has been conducted. Ankle: Exercises  Your Care Instructions  Here are some examples of exercises for your ankle. Start each exercise slowly. Ease off the exercise if you start to have pain. Your doctor or physical therapist will tell you when you can start these exercises and which ones will work best for you. How to do the exercises  \"Alphabet\" exercise    1. Trace the alphabet with your toe. This helps your ankle move in all directions. Side-to-side knee swing exercise    1. Sit in a chair with your foot flat on the floor. 2. Slowly move your knee from side to side while keeping your foot pressed flat. 3. Continue this exercise for 2 to 3 minutes. Towel curl    1. While sitting, place your foot on a towel on the floor and scrunch the towel toward you with your toes. 2. Then use your toes to push the towel away from you. 3. Make this exercise more challenging by placing a weighted object, such as a soup can, on the other end of the towel. Towel stretch    1. Sit with your legs extended and knees straight. 2. Place a towel around your foot just under the toes. 3. Hold each end of the towel in each hand, with your hands above your knees. 4. Pull back with the towel so that your foot stretches toward you. 5. Hold the position for at least 15 to 30 seconds. 6. Repeat 2 to 4 times a session, up to 5 sessions a day. Ankle eversion exercise    1. Start by sitting with your foot flat on the floor and pushing it outward against an immovable object such as the wall or heavy furniture. Hold for about 6 seconds, then relax.  Repeat 8 to 12 times.  2. After you feel comfortable with this, try using rubber tubing looped around the outside of your feet for resistance. Push your foot out to the side against the tubing, and then count to 10 as you slowly bring your foot back to the middle. Repeat 8 to 12 times. Isometric opposition exercises    1. While sitting, put your feet together flat on the floor. 2. Press your injured foot inward against your other foot. Hold for about 6 seconds, and relax. Repeat 8 to 12 times. 3. Then place the heel of your other foot on top of the injured one. Push down with the top heel while trying to push up with your injured foot. Hold for about 6 seconds, and relax. Repeat 8 to 12 times. Follow-up care is a key part of your treatment and safety. Be sure to make and go to all appointments, and call your doctor if you are having problems. It's also a good idea to know your test results and keep a list of the medicines you take. Where can you learn more? Go to http://kayla-narayan.info/. Enter S208 in the search box to learn more about \"Ankle: Exercises. \"  Current as of: March 21, 2017  Content Version: 11.3  © 7721-8441 [employer]iv[employer], Incorporated. Care instructions adapted under license by [employer] (which disclaims liability or warranty for this information). If you have questions about a medical condition or this instruction, always ask your healthcare professional. [place] [address] any warranty or liability for your use of this information.

## 2017-09-12 NOTE — PROCEDURES
FOOT X RAYS 3 VIEWS Right   9/12/2017    NON WEIGHT BEARING    X RAYS AT [address] [place] [address]  9/12/2017      Bones: No fractures or dislocations. No focal osteolytic or osteoblastic process     Bone Spurs: No significant bone spurs  Alignment foot: WNL, METATARSUS ADDUCTUS MILD   Joint Condition: Joint Condition: No Significant OA  Soft Tissues No abnormal calcific densities to soft tissues   No ankle joint effusion in lateral projection. Mineralization: suggests Osteopenia             ANKLE X RAYS 3 VIEWS Right   9/12/2017    NON WEIGHT BEARING    X RAYS AT [address] [place] [address]  9/12/2017    Bones: No fractures or dislocations. No focal osteolytic or osteoblastic process     Bone Spurs: No significant bone spurs  Alignment: Ankle mortise alignment is congruent, Tibial plafond and talar dome intact              SLIGHT LUCENCY MEDIAL TALUS POSSIBLY , Osteochondral defects seen   Joint: No Significant OA changes present  Soft Tissues: Normal, No radiopaque foreign body     No abnormal calcific densities to soft tissues    No ankle joint effusion in lateral projection. Mineralization: Suggests no Osteopenia    I have personally reviewed the results of the above study.  The interpretation of this study is my professional opinion

## 2017-09-12 NOTE — PROGRESS NOTES
AMBULATORY PROGRESS NOTE      Patient: Gary Hurtado             MRN: [id number]     SSN: [id number] Body mass index is 23.91 kg/(m^2). YOB: 1950     AGE: 78 y.o. EX: female    PCP: Ramona Donald MD    IMPRESSION/DIAGNOSIS AND TREATMENT PLAN     DIAGNOSES  1. Acute right ankle pain    2. Right foot pain        Orders Placed This Encounter    [29095] Foot Min 3V    [13731] Ankle 2V    MRI ANKLE RT WO CONT      Gary Hurtado understands her diagnoses and the proposed plan. Plan:    1)  Right A ANKLE MRI: DUE TO PAIN, SWELLING, LOCKING: WANT TO ASSESS FOR TALAR OCD  2)  ACTIVITY  MODIFICATION    RTO     HPI AND EXAMINATION     Gary Hurtado IS A 78 y.o. female who presents to my outpatient office complaining of right ankle pain. She is a 70-year-old female who is seeing me here today for chronic anterolateral right ankle pain. Her symptom duration has been present for many years now, but has increased in frequency over the last several months, what she reports of locking and catching of her ankle that is intermittent. She cannot predict when it is going to occur, but it bothers her particularly while she goes down steps. She has no history of trauma. She has a history of a distal fibula stress fracture probably in the year 2003. She states this was diagnosed several weeks after she had symptoms. She was about 80% healed, so she did not require any type of surgical intervention. She points to her right distal fibula. So, her chief complaint is anterior right ankle pain, stiffness, locking, catching, intermittent for several months to years now but worsening in frequency over the last several months. She cannot work out on an elliptical  and cannot really walk on a treadmill much due to pain and discomfort of the right ankle. 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  She does quite a bit of standing and walking and does quite a bit of walking up and down steps. ANKLE/FOOT right    Psychiatry: Alert, Oriented x 3; Speech normal in context and clarity,            Memory intact grossly, no involuntary movements - tremors, no dementia  Gait: normal  Tenderness: tenderness  TO ANTERIOR ANKLE   Cutaneous: WNL, Joint Motion: WNL  Joint / Tendon Stability: No Ankle or Subtalar instability or joint laxity. No peroneal sublux ability or dislocation  Alignment: Forefoot, Midfoot, Hindfoot WNL. Neuro Motor/Sensory: NL/NL, Vascular: NL foot/ankle pulses  Lymphatics: No extremity lymphedema, No calf swelling, no tenderness to calf muscles. CHART REVIEW     Past Medical History:   Diagnosis Date    Anal fissure     GERD (gastroesophageal reflux disease)      Current Outpatient Prescriptions   Medication Sig    alum-mag hydroxide-simeth (MYLANTA) 200-200-20 mg/5 mL susp Take 30 mL by mouth every four (4) hours as needed.  calcium carbonate (TUMS) 200 mg calcium (500 mg) chew Take 1 Tab by mouth daily.  loratadine (CLARITIN) 10 mg tablet Take 10 mg by mouth daily.  ranitidine (ZANTAC) 150 mg tablet Take 150 mg by mouth two (2) times a day. No current facility-administered medications for this visit. Allergies   Allergen Reactions    Penicillin G Rash     Past Surgical History:   Procedure Laterality Date    BREAST SURGERY PROCEDURE UNLISTED      reduction    HX ENDOSCOPY      HX GI      colonoscopy, EGD    HX GI      ANAL FISSURE    HX GYN      hysterectomy     Social History     Occupational History    Not on file.      Social History Main Topics    Smoking status: Never Smoker    Smokeless tobacco: Never Used    Alcohol use No    Drug use: No    Sexual activity: Not on file     Family History   Problem Relation Age of Onset    Diabetes Father     Diabetes Sister     Hypertension Mother     Stroke Mother     Hypertension Sister        REVIEW OF SYSTEMS : 9/12/2017  ALL BELOW ARE Negative except : SEE HPI       Constitutional: Negative for fever, chills and weight loss. Neg Weigh Loss  Cardiovascular: Negative for chest pain, claudication and leg swelling. SOB, CALDERA   Gastrointestinal: Negative for  pain, N/V/D/C, Blood in stool or urine,dysuria, hematuria,        Incontinence, pelvic pain  Musculoskeletal: see HPI. Neurological: Negative for dizziness and weakness. Negative for headaches,Visual Changes, Confusion, Seizures,   Psychiatric/Behavioral: Negative for depression, memory loss and substance abuse. Extremities:  Negative for  hair changes, rash or skin lesion changes. Hematologic: Negative for Bleeding problems, bruising, pallor or swollen lymph nodes. Peripheral Vascular: No calf pain, vascular vein tenderness to calf pain              No calf throbbing, posterior knee throbbing pain    DIAGNOSTIC IMAGING     FOOT X RAYS 3 VIEWS Right   9/12/2017    NON WEIGHT BEARING    X RAYS AT [address] [place] [address]  9/12/2017      Bones: No fractures or dislocations. No focal osteolytic or osteoblastic process     Bone Spurs: No significant bone spurs  Alignment foot: WNL, METATARSUS ADDUCTUS MILD   Joint Condition: Joint Condition: No Significant OA  Soft Tissues No abnormal calcific densities to soft tissues   No ankle joint effusion in lateral projection. Mineralization: suggests Osteopenia             ANKLE X RAYS 3 VIEWS Right   9/12/2017    NON WEIGHT BEARING    X RAYS AT [address] [place] [address]  9/12/2017    Bones: No fractures or dislocations.  No focal osteolytic or osteoblastic process     Bone Spurs: No significant bone spurs  Alignment: Ankle mortise alignment is congruent, Tibial plafond and talar dome intact              SLIGHT LUCENCY MEDIAL TALUS POSSIBLY , Osteochondral defects seen   Joint: No Significant OA changes present  Soft Tissues: Normal, No radiopaque foreign body     No abnormal calcific densities to soft tissues    No ankle joint effusion in lateral projection. Mineralization: Suggests no Osteopenia    I have personally reviewed the results of the above study. The interpretation of this study is my professional opinion           Written by Camelia Velez, as dictated by Char Ramos MD. I, DrMigdalia, Char Ramos MD, confirm that all documentation is accurate.

## 2017-09-23 ENCOUNTER — HOSPITAL ENCOUNTER (OUTPATIENT)
Age: 67
Discharge: HOME | End: 2017-09-23
Attending: ORTHOPAEDIC SURGERY
Payer: MEDICARE

## 2017-09-23 DIAGNOSIS — M79.671 RIGHT FOOT PAIN: ICD-10-CM

## 2017-09-23 DIAGNOSIS — M25.571 ACUTE RIGHT ANKLE PAIN: ICD-10-CM

## 2017-09-23 PROCEDURE — 73721 MRI JNT OF LWR EXTRE W/O DYE: CPT

## 2017-10-11 ENCOUNTER — OFFICE VISIT (OUTPATIENT)
Dept: ORTHOPEDIC SURGERY | Age: 67
End: 2017-10-11

## 2017-10-11 VITALS
WEIGHT: 134 LBS | HEART RATE: 73 BPM | OXYGEN SATURATION: 99 % | DIASTOLIC BLOOD PRESSURE: 76 MMHG | TEMPERATURE: 97.7 F | RESPIRATION RATE: 18 BRPM | HEIGHT: 63 IN | BODY MASS INDEX: 23.74 KG/M2 | SYSTOLIC BLOOD PRESSURE: 112 MMHG

## 2017-10-11 DIAGNOSIS — M19.071 PRIMARY OSTEOARTHRITIS OF RIGHT ANKLE: Primary | ICD-10-CM

## 2017-10-11 RX ORDER — DICLOFENAC SODIUM 10 MG/G
4 GEL TOPICAL 2 TIMES DAILY
Qty: 100 G | Refills: 1 | Status: SHIPPED | OUTPATIENT
Start: 2017-10-11

## 2017-10-11 RX ORDER — SUCRALFATE 1 G/1
1 TABLET ORAL 4 TIMES DAILY
COMMUNITY

## 2017-10-11 NOTE — PROGRESS NOTES
DIAGNOSTIC IMAGING  [facility]: INTERPRETATION BY RADIOLOGIST       MRI Results (most recent):    Results from Hospital Encounter encounter on 09/23/17   MRI ANKLE RT WO CONT   Narrative EXAMINATION: MRI right ankle without contrast    INDICATION: Right ankle pain    COMPARISON: None    TECHNIQUE: Multiplanar multiecho MRI sequences of the right ankle performed  without contrast.    FINDINGS:    Bones/joints: There is a large subchondral cyst in the medial talar dome  measuring 0.9 x 0.5 cm, with surrounding marrow edema. Subchondral cyst in the  lateral malleolus also. Achilles enthesophyte and plantar calcaneal spur noted. Third TMT degenerative changes. Ligaments: Calcaneofibular ligament intact. Anterior talofibular ligament is  poorly delineated, with at least some fibers felt to be intact. Posterior  talofibular ligament appears largely intact. Anterior and posterior tibiofibular  ligaments appear intact. Edema in the deep deltoid ligament fibers, otherwise  intact. Spring ligament intact. Lisfranc ligament intact. Tendons: Achilles tendon intact. Posterior tibialis tendon intact with minimal  tenosynovitis. Flexor hallucis longus and flexor digitorum longus tendons  intact. The peroneus brevis and longus tendons intact with perhaps mild peroneus  brevis tendinopathy. The tibialis anterior, extensor hallucis longus, and  extensor digitorum longus tendons intact. Soft tissues: Sinus tarsi unremarkable. Mild thickening in the central band of  the plantar fascia, otherwise intact. Mild talonavicular degenerative change. Impression IMPRESSION:      1. In the medial ankle, there is a large medial talar dome subchondral cyst with  surrounding marrow edema, possibly sequela of osteochondral injury or focal  cartilage fissure which is otherwise not clearly delineated. Mild posterior  tibialis tenosynovitis. Major medial ankle ligaments intact.     2. In the lateral ankle, talofibular degenerative changes, evidence of  age-indeterminate anterior talofibular ligament injury, and peroneus brevis  tendinopathy, without discrete tear. 3. Mild plantar fascia central band thickening with small plantar calcaneal  spur. Third TMT and talonavicular degenerative changes. Achilles enthesophyte  noted.

## 2017-10-11 NOTE — PROGRESS NOTES
AMBULATORY PROGRESS NOTE      Patient: Neri Arteaga             MRN: [id number]     SSN: [id number] Body mass index is 23.74 kg/(m^2). YOB: 1950     AGE: 78 y.o. EX: female    PCP: Rl Eaton MD    IMPRESSION/DIAGNOSIS AND TREATMENT PLAN     DIAGNOSES  1. Primary osteoarthritis of right ankle        Orders Placed This Encounter    diclofenac (VOLTAREN) 1 % gel      Neri Arteaga understands her diagnoses and the proposed plan. I had a lengthy discussion with her. Her main pain is along the anterolateral portion of her ankle. Her MRI did show, however, a large, subchondral cyst in the medial talar dome region and some degenerative changes to her distal fibula region. The pain is intermittent. It is more problematic when she does quite a bit of walking or standing or when she does walking for exercise more than two or three miles, but she has cut that back. She is now doing the elliptical . She has modified some of her activities, so she is not having much discomfort today. I did show her the MRI. I did show her the findings. Treatment options at this point for the anterolateral ankle pain greater than medial pain would be conservative care, essentially anti-inflammatories by mouth, cortisone injection, intra-articular or topical anti-inflammatory agent. There is no gross bogginess to her ankle and no gross swelling to her ankle. I explained to her that her subchondral cyst medially may get worse, and she may go on to get further degenerative changes across the ankle, but this is a subchondral area at this point in time. Other treatment options would include a debridement and bone grafting in this area should her symptoms not improve.      If she needed any type of major bone grafting procedure, it would be prudent to preserve the cartilage, but at times, if it worsens, we may not be able to salvage the cartilage, but at this point, the cartilage looks good on MRI.  Again, her complaints are anterolateral primarily. I pressed her on this, and she still mentions that the discomfort is anterolateral, not medial.      Plan:    1) Voltaren 1% Gel: 4 g BID; 100 g, 1 refill. RTO - 2 months    HPI AND EXAMINATION     Merritt Green IS A 78 y.o. female who presents to my outpatient office for follow up of right ankle pain. At last visit, I recommended activity modification, and ordered a MRI of the right ankle due to pain, swelling, and locking. The patient presents to the office today noting that she is able to walk, but she experiences continuing discomfort along her right ankle. MR imaging results has been reviewed with the patient. She notes that most of her discomfort and pain arises from the lateral aspect of her right ankle. She reports occasional swelling to her right ankle. The patient states that the discomfort is intermittent, that can come up if she exercises. She reports that she walks about 3 miles for exercise, and attends [employer] classes. Patient denies taking any medication for Rheumatoid Arthritis. Patient endorses gastric reflux. ANKLE/FOOT right     Psychiatry: Alert, Oriented x 3; Speech normal in context and clarity,                                 Memory intact grossly, no involuntary movements - tremors, no dementia  Gait: normal  Tenderness: tenderness  TO ANTERIOR ANKLE   Cutaneous: WNL, no effusion present  Joint Motion: WNL  Joint / Tendon Stability: No Ankle or Subtalar instability or joint laxity. No peroneal sublux ability or dislocation  Alignment: Forefoot, Midfoot, Hindfoot WNL. Neuro Motor/Sensory: NL/NL  Vascular: NL foot/ankle pulses  Lymphatics: No extremity lymphedema, No calf swelling, no tenderness to calf muscles.      CHART REVIEW     Past Medical History:   Diagnosis Date    Anal fissure     GERD (gastroesophageal reflux disease)      Current Outpatient Prescriptions   Medication Sig    sucralfate (CARAFATE) 1 gram tablet Take 1 g by mouth four (4) times daily.  diclofenac (VOLTAREN) 1 % gel Apply 4 g to affected area two (2) times a day. Apply to affected lateral ankle twice daily    alum-mag hydroxide-simeth (MYLANTA) 200-200-20 mg/5 mL susp Take 30 mL by mouth every four (4) hours as needed.  calcium carbonate (TUMS) 200 mg calcium (500 mg) chew Take 1 Tab by mouth daily.  loratadine (CLARITIN) 10 mg tablet Take 10 mg by mouth daily.  ranitidine (ZANTAC) 150 mg tablet Take 150 mg by mouth two (2) times a day. No current facility-administered medications for this visit. Allergies   Allergen Reactions    Penicillin G Rash     Past Surgical History:   Procedure Laterality Date    BREAST SURGERY PROCEDURE UNLISTED      reduction    HX ENDOSCOPY      HX GI      colonoscopy, EGD    HX GI      ANAL FISSURE    HX GYN      hysterectomy     Social History     Occupational History    Not on file. Social History Main Topics    Smoking status: Never Smoker    Smokeless tobacco: Never Used    Alcohol use No    Drug use: No    Sexual activity: Not on file     Family History   Problem Relation Age of Onset    Diabetes Father     Diabetes Sister     Hypertension Mother     Stroke Mother     Hypertension Sister        REVIEW OF SYSTEMS : 10/11/2017  ALL BELOW ARE Negative except : SEE HPI       Constitutional: Negative for fever, chills and weight loss. Neg Weigh Loss  Cardiovascular: Negative for chest pain, claudication and leg swelling. SOB, CALDERA   Gastrointestinal: Negative for  pain, N/V/D/C, Blood in stool or urine,dysuria, hematuria,        Incontinence, pelvic pain  Musculoskeletal: see HPI. Neurological: Negative for dizziness and weakness. Negative for headaches,Visual Changes, Confusion, Seizures,   Psychiatric/Behavioral: Negative for depression, memory loss and substance abuse.    Extremities:  Negative for hair changes, rash or skin lesion changes. Hematologic: Negative for Bleeding problems, bruising, pallor or swollen lymph nodes. Peripheral Vascular: No calf pain, vascular vein tenderness to calf pain              No calf throbbing, posterior knee throbbing pain    DIAGNOSTIC IMAGING     No notes on file    MRI Results (most recent):    Results from Hospital Encounter encounter on 09/23/17   MRI ANKLE RT WO CONT   Narrative EXAMINATION: MRI right ankle without contrast    INDICATION: Right ankle pain    COMPARISON: None    TECHNIQUE: Multiplanar multiecho MRI sequences of the right ankle performed  without contrast.    FINDINGS:    Bones/joints: There is a large subchondral cyst in the medial talar dome  measuring 0.9 x 0.5 cm, with surrounding marrow edema. Subchondral cyst in the  lateral malleolus also. Achilles enthesophyte and plantar calcaneal spur noted. Third TMT degenerative changes. Ligaments: Calcaneofibular ligament intact. Anterior talofibular ligament is  poorly delineated, with at least some fibers felt to be intact. Posterior  talofibular ligament appears largely intact. Anterior and posterior tibiofibular  ligaments appear intact. Edema in the deep deltoid ligament fibers, otherwise  intact. Spring ligament intact. Lisfranc ligament intact. Tendons: Achilles tendon intact. Posterior tibialis tendon intact with minimal  tenosynovitis. Flexor hallucis longus and flexor digitorum longus tendons  intact. The peroneus brevis and longus tendons intact with perhaps mild peroneus  brevis tendinopathy. The tibialis anterior, extensor hallucis longus, and  extensor digitorum longus tendons intact. Soft tissues: Sinus tarsi unremarkable. Mild thickening in the central band of  the plantar fascia, otherwise intact. Mild talonavicular degenerative change. Impression IMPRESSION:      1.  In the medial ankle, there is a large medial talar dome subchondral cyst with  surrounding marrow edema, possibly sequela of osteochondral injury or focal  cartilage fissure which is otherwise not clearly delineated. Mild posterior  tibialis tenosynovitis. Major medial ankle ligaments intact. 2. In the lateral ankle, talofibular degenerative changes, evidence of  age-indeterminate anterior talofibular ligament injury, and peroneus brevis  tendinopathy, without discrete tear. 3. Mild plantar fascia central band thickening with small plantar calcaneal  spur. Third TMT and talonavicular degenerative changes. Achilles enthesophyte  noted. Written by Jory Eisenmenger, as dictated by Shira Crockett MD. I, DrMigdalia, Shira Crockett MD, confirm that all documentation is accurate.

## 2017-10-11 NOTE — MR AVS SNAPSHOT
Visit Information Date & Time Provider Department Dept. Phone Encounter #  
 10/11/2017  8:00 AM Jodie Hill MD [address] [facility] [phone] [id number] Upcoming Health Maintenance Date Due Hepatitis C Screening 1950 DTaP/Tdap/Td series (1 - Tdap) 5/24/1972 BREAST CANCER SCRN MAMMOGRAM 5/24/2001 FOBT Q 1 YEAR AGE 50-75 5/24/2001 ZOSTER VACCINE AGE 60> 3/24/2011 GLAUCOMA SCREENING Q2Y 5/24/2016 OSTEOPOROSIS SCREENING (DEXA) 5/24/2016 MEDICARE YEARLY EXAM 5/24/2016 INFLUENZA AGE 9 TO ADULT 8/1/2017 Pneumococcal 65+ Low/Medium Risk (2 of 2 - PPSV23) 1/10/2018 Allergies as of 10/11/2017  Review Complete On: 10/11/2017 By: Octavia Barger Severity Noted Reaction Type Reactions Penicillin G  09/18/2015    Rash Current Immunizations  Never Reviewed No immunizations on file. Not reviewed this visit You Were Diagnosed With   
  
 Codes Comments Primary osteoarthritis of right ankle    -  Primary ICD-10-CM: M19.071 ICD-9-CM: 715.17 Vitals BP Pulse Temp Resp Height(growth percentile) Weight(growth percentile) 112/76 73 97.7 °F (36.5 °C) (Oral) 18 5' 3\" (1.6 m) 134 lb (60.8 kg) SpO2 BMI OB Status Smoking Status 99% 23.74 kg/m2 Hysterectomy Never Smoker BMI and BSA Data Body Mass Index Body Surface Area  
 23.74 kg/m 2 1.64 m 2 Preferred Pharmacy Pharmacy Name Phone [facility] [address] [place] [address] [phone] Your Updated Medication List  
  
   
This list is accurate as of: 10/11/17  8:51 AM.  Always use your most recent med list.  
  
  
  
  
 alum-mag hydroxide-simeth 200-200-20 mg/5 mL Susp Commonly known as:  MYLANTA Take 30 mL by mouth every four (4) hours as needed. calcium carbonate 200 mg calcium (500 mg) Chew Commonly known as:  TUMS Take 1 Tab by mouth daily. CLARITIN 10 mg tablet Generic drug:  loratadine Take 10 mg by mouth daily. sucralfate 1 gram tablet Commonly known as:  Gaylan Prafulns Take 1 g by mouth four (4) times daily. ZANTAC 150 mg tablet Generic drug:  raNITIdine Take 150 mg by mouth two (2) times a day. Patient Instructions Please follow up in 2 months. You are advised to contact us if your condition worsens. Ankle: Exercises Your Care Instructions Here are some examples of exercises for your ankle. Start each exercise slowly. Ease off the exercise if you start to have pain. Your doctor or physical therapist will tell you when you can start these exercises and which ones will work best for you. How to do the exercises \"Alphabet\" exercise 1. Trace the alphabet with your toe. This helps your ankle move in all directions. Side-to-side knee swing exercise 1. Sit in a chair with your foot flat on the floor. 2. Slowly move your knee from side to side while keeping your foot pressed flat. 3. Continue this exercise for 2 to 3 minutes. Towel curl 1. While sitting, place your foot on a towel on the floor and scrunch the towel toward you with your toes. 2. Then use your toes to push the towel away from you. 3. Make this exercise more challenging by placing a weighted object, such as a soup can, on the other end of the towel. Towel stretch 1. Sit with your legs extended and knees straight. 2. Place a towel around your foot just under the toes. 3. Hold each end of the towel in each hand, with your hands above your knees. 4. Pull back with the towel so that your foot stretches toward you. 5. Hold the position for at least 15 to 30 seconds. 6. Repeat 2 to 4 times a session, up to 5 sessions a day. Ankle eversion exercise 1. Start by sitting with your foot flat on the floor and pushing it outward against an immovable object such as the wall or heavy furniture. Hold for about 6 seconds, then relax. Repeat 8 to 12 times. 2. After you feel comfortable with this, try using rubber tubing looped around the outside of your feet for resistance. Push your foot out to the side against the tubing, and then count to 10 as you slowly bring your foot back to the middle. Repeat 8 to 12 times. Isometric opposition exercises 1. While sitting, put your feet together flat on the floor. 2. Press your injured foot inward against your other foot. Hold for about 6 seconds, and relax. Repeat 8 to 12 times. 3. Then place the heel of your other foot on top of the injured one. Push down with the top heel while trying to push up with your injured foot. Hold for about 6 seconds, and relax. Repeat 8 to 12 times. Follow-up care is a key part of your treatment and safety. Be sure to make and go to all appointments, and call your doctor if you are having problems. It's also a good idea to know your test results and keep a list of the medicines you take. Where can you learn more? Go to http://kayla-narayan.info/. Enter V285 in the search box to learn more about \"Ankle: Exercises. \" Current as of: March 21, 2017 Content Version: 11.3 © 3407-5317 [employer] Care instructions adapted under license by [employer] (which disclaims liability or warranty for this information). If you have questions about a medical condition or this instruction, always ask your healthcare professional. [place] [address] any warranty or liability for your use of this information. Introducing [facility]! [facility] introduces [facility] patient portal. Now you can access parts of your medical record, email your doctor's office, and request medication refills online. 1. In your internet browser, go to [email or web address] 2. Click on the First Time User? Click Here link in the Sign In box. You will see the New Member Sign Up page. 3. Enter your [employer] Access Code exactly as it appears below. You will not need to use this code after youve completed the sign-up process. If you do not sign up before the expiration date, you must request a new code. · [employer] Access Code: [email or web address] Expires: 12/11/2017  2:57 PM 
 
4. Enter the last four digits of your Social Security Number (xxxx) and Date of Birth (mm/dd/yyyy) as indicated and click Submit. You will be taken to the next sign-up page. 5. Create a [employer] ID. This will be your [employer] login ID and cannot be changed, so think of one that is secure and easy to remember. 6. Create a [employer] password. You can change your password at any time. 7. Enter your Password Reset Question and Answer. This can be used at a later time if you forget your password. 8. Enter your e-mail address. You will receive e-mail notification when new information is available in [address]. 9. Click Sign Up. You can now view and download portions of your medical record. 10. Click the Download Summary menu link to download a portable copy of your medical information. If you have questions, please visit the Frequently Asked Questions section of the [employer] website. Remember, [employer] is NOT to be used for urgent needs. For medical emergencies, dial 911. Now available from your iPhone and Android! Please provide this summary of care documentation to your next provider. Your primary care clinician is listed as Heather Eugene. If you have any questions after today's visit, please call [phone].

## 2017-10-11 NOTE — PATIENT INSTRUCTIONS
Please follow up in 2 months. You are advised to contact us if your condition worsens. Ankle: Exercises  Your Care Instructions  Here are some examples of exercises for your ankle. Start each exercise slowly. Ease off the exercise if you start to have pain. Your doctor or physical therapist will tell you when you can start these exercises and which ones will work best for you. How to do the exercises  \"Alphabet\" exercise    1. Trace the alphabet with your toe. This helps your ankle move in all directions. Side-to-side knee swing exercise    1. Sit in a chair with your foot flat on the floor. 2. Slowly move your knee from side to side while keeping your foot pressed flat. 3. Continue this exercise for 2 to 3 minutes. Towel curl    1. While sitting, place your foot on a towel on the floor and scrunch the towel toward you with your toes. 2. Then use your toes to push the towel away from you. 3. Make this exercise more challenging by placing a weighted object, such as a soup can, on the other end of the towel. Towel stretch    1. Sit with your legs extended and knees straight. 2. Place a towel around your foot just under the toes. 3. Hold each end of the towel in each hand, with your hands above your knees. 4. Pull back with the towel so that your foot stretches toward you. 5. Hold the position for at least 15 to 30 seconds. 6. Repeat 2 to 4 times a session, up to 5 sessions a day. Ankle eversion exercise    1. Start by sitting with your foot flat on the floor and pushing it outward against an immovable object such as the wall or heavy furniture. Hold for about 6 seconds, then relax. Repeat 8 to 12 times. 2. After you feel comfortable with this, try using rubber tubing looped around the outside of your feet for resistance. Push your foot out to the side against the tubing, and then count to 10 as you slowly bring your foot back to the middle. Repeat 8 to 12 times.   Isometric opposition exercises    1. While sitting, put your feet together flat on the floor. 2. Press your injured foot inward against your other foot. Hold for about 6 seconds, and relax. Repeat 8 to 12 times. 3. Then place the heel of your other foot on top of the injured one. Push down with the top heel while trying to push up with your injured foot. Hold for about 6 seconds, and relax. Repeat 8 to 12 times. Follow-up care is a key part of your treatment and safety. Be sure to make and go to all appointments, and call your doctor if you are having problems. It's also a good idea to know your test results and keep a list of the medicines you take. Where can you learn more? Go to http://kayla-narayan.info/. Enter Y492 in the search box to learn more about \"Ankle: Exercises. \"  Current as of: March 21, 2017  Content Version: 11.3  © 0150-7906 [employer]. Care instructions adapted under license by [employer] (which disclaims liability or warranty for this information). If you have questions about a medical condition or this instruction, always ask your healthcare professional. [place] [address] any warranty or liability for your use of this information.

## 2017-12-11 ENCOUNTER — OFFICE VISIT (OUTPATIENT)
Dept: ORTHOPEDIC SURGERY | Age: 67
End: 2017-12-11

## 2017-12-11 VITALS
BODY MASS INDEX: 23.92 KG/M2 | DIASTOLIC BLOOD PRESSURE: 77 MMHG | TEMPERATURE: 96.7 F | WEIGHT: 135 LBS | OXYGEN SATURATION: 100 % | HEART RATE: 79 BPM | HEIGHT: 63 IN | SYSTOLIC BLOOD PRESSURE: 117 MMHG

## 2017-12-11 DIAGNOSIS — M19.071 PRIMARY OSTEOARTHRITIS OF RIGHT ANKLE: Primary | ICD-10-CM

## 2017-12-11 NOTE — PROGRESS NOTES
AMBULATORY PROGRESS NOTE      Patient: Marva Cuevas             MRN: [id number]     SSN: [id number] Body mass index is 23.91 kg/(m^2). YOB: 1950     AGE: 78 y.o. EX: female    PCP: Ean Swift MD    IMPRESSION/DIAGNOSIS AND TREATMENT PLAN     DIAGNOSES  1. Primary osteoarthritis of right ankle        No orders of the defined types were placed in this encounter. Marva Cuevas understands her diagnoses and the proposed plan. Plan:    1) Use Voltaren 1% gel as directed. RTO - 3 months    HPI AND EXAMINATION     Marva Cuevas IS A 78 y.o. female who presents to my outpatient office for follow up of right ankle pain and primary osteoarthritis of the right ankle. At last visit, I prescribed Voltaren 1% gel. The patient presents to the office today c/o lateral right ankle. She notes discomfort while going up steps along the lateral aspect of the right ankle. She additionally notes her right ankle can become stiff at times. Patient did not fill her Voltaren 1% gel, but has been instructed to fill it and use as directed. It is to be noted that in the past, I have seen her for anterolateral pain of the right ankle. She had an MRI of her right ankle in October 2017 that confirms a very large, medial talar dome osteochondral lesion. Her pain at the time when I saw her last was intermittent. She cut back on her walking and was doing more elliptical . Overall, I am concerned just because of the size of the lesion medially and that she may go on to develop degenerative changes across the ankle and/or subtalar region more medially. Certainly, it is something I need to keep an eye on and watch her closely to see whether her symptoms improve or worsen. I delineated a specific note on October 11, 2017.      Patient endorses gastric reflux.      ANKLE/FOOT right      Psychiatry: Alert, Oriented x 3; Speech normal in context and clarity,                       Memory intact grossly, no involuntary movements - tremors, no dementia  Gait: normal  Tenderness: no tenderness to ankle foot at this time. Cutaneous: WNL, no effusion present  Joint Motion: WNL  Joint / Tendon Stability: No Ankle or Subtalar instability or joint laxity.                                            No peroneal sublux ability or dislocation  Alignment: Forefoot, Midfoot, Hindfoot WNL. Neuro Motor/Sensory: NL/NL  Vascular: NL foot/ankle pulses  Lymphatics: No extremity lymphedema, No calf swelling, no tenderness to calf muscles. CHART REVIEW     Past Medical History:   Diagnosis Date    Anal fissure     GERD (gastroesophageal reflux disease)      Current Outpatient Prescriptions   Medication Sig    sucralfate (CARAFATE) 1 gram tablet Take 1 g by mouth four (4) times daily.  diclofenac (VOLTAREN) 1 % gel Apply 4 g to affected area two (2) times a day. Apply to affected lateral ankle twice daily    alum-mag hydroxide-simeth (MYLANTA) 200-200-20 mg/5 mL susp Take 30 mL by mouth every four (4) hours as needed.  calcium carbonate (TUMS) 200 mg calcium (500 mg) chew Take 1 Tab by mouth daily.  loratadine (CLARITIN) 10 mg tablet Take 10 mg by mouth daily.  ranitidine (ZANTAC) 150 mg tablet Take 150 mg by mouth two (2) times a day. No current facility-administered medications for this visit. Allergies   Allergen Reactions    Penicillin G Rash     Past Surgical History:   Procedure Laterality Date    BREAST SURGERY PROCEDURE UNLISTED      reduction    HX ENDOSCOPY      HX GI      colonoscopy, EGD    HX GI      ANAL FISSURE    HX GYN      hysterectomy     Social History     Occupational History    Not on file.      Social History Main Topics    Smoking status: Never Smoker    Smokeless tobacco: Never Used    Alcohol use No    Drug use: No    Sexual activity: Not on file     Family History   Problem Relation Age of Onset    Diabetes Father     Diabetes Sister     Hypertension Mother     Stroke Mother     Hypertension Sister        REVIEW OF SYSTEMS : 12/11/2017  ALL BELOW ARE Negative except : SEE HPI       Constitutional: Negative for fever, chills and weight loss. Neg Weigh Loss  Cardiovascular: Negative for chest pain, claudication and leg swelling. SOB, CALDERA   Gastrointestinal: Negative for  pain, N/V/D/C, Blood in stool or urine,dysuria, hematuria,        Incontinence, pelvic pain  Musculoskeletal: see HPI. Neurological: Negative for dizziness and weakness. Negative for headaches,Visual Changes, Confusion, Seizures,   Psychiatric/Behavioral: Negative for depression, memory loss and substance abuse. Extremities:  Negative for  hair changes, rash or skin lesion changes. Hematologic: Negative for Bleeding problems, bruising, pallor or swollen lymph nodes. Peripheral Vascular: No calf pain, vascular vein tenderness to calf pain              No calf throbbing, posterior knee throbbing pain    DIAGNOSTIC IMAGING     No notes on file    Written by Birgit Vazquez, as dictated by Criss Narayan MD. IDr., Criss Narayan MD, confirm that all documentation is accurate.

## 2017-12-11 NOTE — PATIENT INSTRUCTIONS
Please follow up in 3 months. You are advised to contact us if your condition worsens. Foot Pain: Care Instructions  Your Care Instructions  Foot injuries that cause pain and swelling are fairly common. Almost all sports or home repair projects can cause a misstep that ends up as foot pain. Normal wear and tear, especially as you get older, also can cause foot pain. Most minor foot injuries will heal on their own, and home treatment is usually all you need to do. If you have a severe injury, you may need tests and treatment. Follow-up care is a key part of your treatment and safety. Be sure to make and go to all appointments, and call your doctor if you are having problems. It's also a good idea to know your test results and keep a list of the medicines you take. How can you care for yourself at home? · Take pain medicines exactly as directed. ¨ If the doctor gave you a prescription medicine for pain, take it as prescribed. ¨ If you are not taking a prescription pain medicine, ask your doctor if you can take an over-the-counter medicine. · Rest and protect your foot. Take a break from any activity that may cause pain. · Put ice or a cold pack on your foot for 10 to 20 minutes at a time. Put a thin cloth between the ice and your skin. · Prop up the sore foot on a pillow when you ice it or anytime you sit or lie down during the next 3 days. Try to keep it above the level of your heart. This will help reduce swelling. · Your doctor may recommend that you wrap your foot with an elastic bandage. Keep your foot wrapped for as long as your doctor advises. · If your doctor recommends crutches, use them as directed. · Wear roomy footwear. · As soon as pain and swelling end, begin gentle exercises of your foot. Your doctor can tell you which exercises will help. When should you call for help? Call 911 anytime you think you may need emergency care.  For example, call if:  ? · Your foot turns pale, white, blue, or cold. ?Call your doctor now or seek immediate medical care if:  ? · You cannot move or stand on your foot. ? · Your foot looks twisted or out of its normal position. ? · Your foot is not stable when you step down. ? · You have signs of infection, such as:  ¨ Increased pain, swelling, warmth, or redness. ¨ Red streaks leading from the sore area. ¨ Pus draining from a place on your foot. ¨ A fever. ? · Your foot is numb or tingly. ? Watch closely for changes in your health, and be sure to contact your doctor if:  ? · You do not get better as expected. ? · You have bruises from an injury that last longer than 2 weeks. Where can you learn more? Go to http://kayla-narayan.info/. Enter Y480 in the search box to learn more about \"Foot Pain: Care Instructions. \"  Current as of: March 21, 2017  Content Version: 11.4  © 2790-9431 [employer]. Care instructions adapted under license by [employer] (which disclaims liability or warranty for this information). If you have questions about a medical condition or this instruction, always ask your healthcare professional. [place] [address] any warranty or liability for your use of this information.

## 2017-12-11 NOTE — MR AVS SNAPSHOT
Visit Information Date & Time Provider Department Dept. Phone Encounter #  
 12/11/2017  8:50 AM Sudha Cummings, [address] [facility]  Your Appointments 12/11/2017  8:50 AM  
Follow Up with Sudha Cumminsg MD  
[address] [facility], [address] [facility] - [address] ([address]) Appt Note: RT ANKLE [address] [facility], [address] 200 [facility]  
[phone] [address] [facility] Upcoming Health Maintenance Date Due Hepatitis C Screening 1950 DTaP/Tdap/Td series (1 - Tdap) 5/24/1972 BREAST CANCER SCRN MAMMOGRAM 5/24/2001 FOBT Q 1 YEAR AGE 50-75 5/24/2001 ZOSTER VACCINE AGE 60> 3/24/2011 GLAUCOMA SCREENING Q2Y 5/24/2016 OSTEOPOROSIS SCREENING (DEXA) 5/24/2016 MEDICARE YEARLY EXAM 5/24/2016 Influenza Age 5 to Adult 8/1/2017 Pneumococcal 65+ Low/Medium Risk (2 of 2 - PPSV23) 1/10/2018 Allergies as of 12/11/2017  Review Complete On: 12/11/2017 By: Sudha Cummings MD  
  
 Severity Noted Reaction Type Reactions Penicillin G  09/18/2015    Rash Current Immunizations  Never Reviewed No immunizations on file. Not reviewed this visit You Were Diagnosed With   
  
 Codes Comments Primary osteoarthritis of right ankle    -  Primary ICD-10-CM: M19.071 ICD-9-CM: 715.17 Vitals BP Pulse Temp Height(growth percentile) Weight(growth percentile) SpO2  
 117/77 79 96.7 °F (35.9 °C) (Oral) 5' 3\" (1.6 m) 135 lb (61.2 kg) 100% BMI OB Status Smoking Status 23.91 kg/m2 Hysterectomy Never Smoker BMI and BSA Data Body Mass Index Body Surface Area  
 23.91 kg/m 2 1.65 m 2 Preferred Pharmacy Pharmacy Name Phone [facility] [address] [facility], [address] [place] [address] Avenue [phone] Your Updated Medication List  
  
   
 This list is accurate as of: 12/11/17  8:37 AM.  Always use your most recent med list.  
  
  
  
  
 alum-mag hydroxide-simeth 200-200-20 mg/5 mL Susp Commonly known as:  MYLANTA Take 30 mL by mouth every four (4) hours as needed. calcium carbonate 200 mg calcium (500 mg) Chew Commonly known as:  TUMS Take 1 Tab by mouth daily. CLARITIN 10 mg tablet Generic drug:  loratadine Take 10 mg by mouth daily. diclofenac 1 % Gel Commonly known as:  VOLTAREN Apply 4 g to affected area two (2) times a day. Apply to affected lateral ankle twice daily  
  
 sucralfate 1 gram tablet Commonly known as:  Wilhelminia Lynn Take 1 g by mouth four (4) times daily. ZANTAC 150 mg tablet Generic drug:  raNITIdine Take 150 mg by mouth two (2) times a day. Introducing [facility]! [employer] introduces xAd patient portal. Now you can access parts of your medical record, email your doctor's office, and request medication refills online. 1. In your internet browser, go to [email or web address] 2. Click on the First Time User? Click Here link in the Sign In box. You will see the New Member Sign Up page. 3. Enter your xAd Access Code exactly as it appears below. You will not need to use this code after youve completed the sign-up process. If you do not sign up before the expiration date, you must request a new code. · xAd Access Code: [email or web address] Expires: 12/11/2017  1:57 PM 
 
4. Enter the last four digits of your Social Security Number (xxxx) and Date of Birth (mm/dd/yyyy) as indicated and click Submit. You will be taken to the next sign-up page. 5. Create a Grokrt ID. This will be your xAd login ID and cannot be changed, so think of one that is secure and easy to remember. 6. Create a xAd password. You can change your password at any time. 7. Enter your Password Reset Question and Answer.  This can be used at a later time if you forget your password. 8. Enter your e-mail address. You will receive e-mail notification when new information is available in [address] 9. Click Sign Up. You can now view and download portions of your medical record. 10. Click the Download Summary menu link to download a portable copy of your medical information. If you have questions, please visit the Frequently Asked Questions section of the [employer] website. Remember, [employer] is NOT to be used for urgent needs. For medical emergencies, dial 911. Now available from your iPhone and Android! Please provide this summary of care documentation to your next provider. Your primary care clinician is listed as Giuseppe Gimenez. If you have any questions after today's visit, please call [phone].

## 2019-02-08 ENCOUNTER — HOSPITAL ENCOUNTER (OUTPATIENT)
Dept: LAB | Age: 69
Discharge: HOME | End: 2019-02-08
Payer: MEDICARE

## 2019-02-08 DIAGNOSIS — Z00.00 ROUTINE GENERAL MEDICAL EXAMINATION AT A HEALTH CARE FACILITY: ICD-10-CM

## 2019-02-08 LAB
ALBUMIN SERPL-MCNC: 3.7 G/DL (ref 3.4–5)
ALBUMIN/GLOB SERPL: 1.2 {RATIO} (ref 0.8–1.7)
ALP SERPL-CCNC: 98 U/L (ref 45–117)
ALT SERPL-CCNC: 22 U/L (ref 13–56)
ANION GAP SERPL CALC-SCNC: 6 MMOL/L (ref 3–18)
AST SERPL-CCNC: 23 U/L (ref 15–37)
ATRIAL RATE: 68 BPM
BASOPHILS # BLD: 0 K/UL (ref 0–0.1)
BASOPHILS NFR BLD: 1 % (ref 0–2)
BILIRUB SERPL-MCNC: 0.4 MG/DL (ref 0.2–1)
BUN SERPL-MCNC: 15 MG/DL (ref 7–18)
BUN/CREAT SERPL: 17 (ref 12–20)
CALCIUM SERPL-MCNC: 9 MG/DL (ref 8.5–10.1)
CALCULATED P AXIS, ECG09: 48 DEGREES
CALCULATED R AXIS, ECG10: -4 DEGREES
CALCULATED T AXIS, ECG11: 10 DEGREES
CHLORIDE SERPL-SCNC: 107 MMOL/L (ref 100–108)
CO2 SERPL-SCNC: 27 MMOL/L (ref 21–32)
CREAT SERPL-MCNC: 0.89 MG/DL (ref 0.6–1.3)
DIAGNOSIS, 93000: NORMAL
DIFFERENTIAL METHOD BLD: ABNORMAL
EOSINOPHIL # BLD: 0.3 K/UL (ref 0–0.4)
EOSINOPHIL NFR BLD: 8 % (ref 0–5)
ERYTHROCYTE [DISTWIDTH] IN BLOOD BY AUTOMATED COUNT: 13.6 % (ref 11.6–14.5)
GLOBULIN SER CALC-MCNC: 3.2 G/DL (ref 2–4)
GLUCOSE SERPL-MCNC: 85 MG/DL (ref 74–99)
HCT VFR BLD AUTO: 36.7 % (ref 35–45)
HGB BLD-MCNC: 11.5 G/DL (ref 12–16)
LYMPHOCYTES # BLD: 1.5 K/UL (ref 0.9–3.6)
LYMPHOCYTES NFR BLD: 40 % (ref 21–52)
MCH RBC QN AUTO: 25.6 PG (ref 24–34)
MCHC RBC AUTO-ENTMCNC: 31.3 G/DL (ref 31–37)
MCV RBC AUTO: 81.6 FL (ref 74–97)
MONOCYTES # BLD: 0.3 K/UL (ref 0.05–1.2)
MONOCYTES NFR BLD: 7 % (ref 3–10)
NEUTS SEG # BLD: 1.7 K/UL (ref 1.8–8)
NEUTS SEG NFR BLD: 44 % (ref 40–73)
P-R INTERVAL, ECG05: 178 MS
PLATELET # BLD AUTO: 310 K/UL (ref 135–420)
PMV BLD AUTO: 10.7 FL (ref 9.2–11.8)
POTASSIUM SERPL-SCNC: 4 MMOL/L (ref 3.5–5.5)
PROT SERPL-MCNC: 6.9 G/DL (ref 6.4–8.2)
Q-T INTERVAL, ECG07: 410 MS
QRS DURATION, ECG06: 64 MS
QTC CALCULATION (BEZET), ECG08: 435 MS
RBC # BLD AUTO: 4.5 M/UL (ref 4.2–5.3)
SODIUM SERPL-SCNC: 140 MMOL/L (ref 136–145)
VENTRICULAR RATE, ECG03: 68 BPM
WBC # BLD AUTO: 3.7 K/UL (ref 4.6–13.2)

## 2019-02-08 PROCEDURE — 93005 ELECTROCARDIOGRAM TRACING: CPT

## 2019-02-08 PROCEDURE — 82785 ASSAY OF IGE: CPT

## 2019-02-08 PROCEDURE — 80053 COMPREHEN METABOLIC PANEL: CPT

## 2019-02-08 PROCEDURE — 36415 COLL VENOUS BLD VENIPUNCTURE: CPT

## 2019-02-08 PROCEDURE — 85025 COMPLETE CBC W/AUTO DIFF WBC: CPT

## 2019-02-09 ENCOUNTER — HOSPITAL ENCOUNTER (OUTPATIENT)
Dept: CT IMAGING | Age: 69
Discharge: HOME | End: 2019-02-09
Attending: INTERNAL MEDICINE
Payer: MEDICARE

## 2019-02-09 DIAGNOSIS — R10.11 RIGHT UPPER QUADRANT PAIN: ICD-10-CM

## 2019-02-09 PROCEDURE — 74011636320 HC RX REV CODE- 636/320: Performed by: INTERNAL MEDICINE

## 2019-02-09 PROCEDURE — 74160 CT ABDOMEN W/CONTRAST: CPT

## 2019-02-09 RX ADMIN — IOPAMIDOL 100 ML: 612 INJECTION, SOLUTION INTRAVENOUS at 10:32

## 2019-02-12 LAB — IGE SERPL-ACNC: 82 IU/ML (ref 0–100)

## 2019-04-08 ENCOUNTER — HOSPITAL ENCOUNTER (OUTPATIENT)
Dept: PHYSICAL THERAPY | Age: 69
Discharge: HOME | End: 2019-04-08
Payer: MEDICARE

## 2019-04-08 PROCEDURE — 97535 SELF CARE MNGMENT TRAINING: CPT

## 2019-04-08 PROCEDURE — 97110 THERAPEUTIC EXERCISES: CPT

## 2019-04-08 PROCEDURE — 97161 PT EVAL LOW COMPLEX 20 MIN: CPT

## 2019-04-08 NOTE — PROGRESS NOTES
In [address]  [address] [place] [address] [place], [address] 
[phone] fax Plan of Care/ Statement of Necessity for Physical Therapy Services Patient name: Ramón Shea Start of Care: 2019 Referral source: Rocio Ramirez MD : 1950 Medical Diagnosis: Cervicalgia [M54.2] Upper back pain [M54.9] Payor: [place] MEDICARE / Plan: [address] [place], [place] [address]y / Product Type: Medicare /  Onset Date:3-4 weeks ago Treatment Diagnosis: right neck pain Prior Hospitalization: see medical history Provider#: [id number] Medications: Verified on Patient summary List  
 Comorbidities: colonoscopy, GERD, hiatal hernia, otherwise unremarkable Prior Level of Function: gym 4 days/week, exercise with light weight void of limitations, deskwork void of pain The Plan of Care and following information is based on the information from the initial evaluation. Assessment/ key information: Pt is a 80year old female who reports onset of right sided neck pain 3-4 weeks ago. She reports it tends to ache constantly, but can increase in severity with prolonged seated computer work (taking classes at the moment) as well as when performing pushups in the gym. She reports she has had this problem occasionally in the past, and it has responded well to PT. Signs and symptoms appear consistent with myofascial pain with associated deficits consisting of mild right shoulder flexion and ER weakness, mild kyphotic forward shoulder posture, right sided neck pain with end range right rotation and SB, and TTP right upper trap and levator scapula. Pt will benefit from skilled PT management to address their impairments and improve their level of function.  
 
 LOW Complexity : Zero comorbidities / personal factors that will impact the outcome / POC; Examination MEDIUM Complexity : 3 Standardized tests and measures addressing body structure, function, activity limitation and / or participation in recreation  ;Presentation LOW Complexity : Stable, uncomplicated  ;Clinical Decision Making MEDIUM Complexity : FOTO score of 26-74 Overall Complexity Rating: LOW Problem List: pain affecting function, decrease ROM, decrease strength, decrease ADL/ functional abilitiies and decrease activity tolerance Treatment Plan may include any combination of the following: Therapeutic exercise, Therapeutic activities, Neuromuscular re-education, Physical agent/modality, Manual therapy, Patient education and Self Care training Patient / Family readiness to learn indicated by: asking questions, trying to perform skills and interest 
Persons(s) to be included in education: patient (P) Barriers to Learning/Limitations: None Patient Goal (s): Eliminate pain Patient Self Reported Health Status: good Rehabilitation Potential: good Short Term Goals: To be accomplished in 2 weeks: 1. Patient will report performance of HEP at least 2 times per day to facilitate improved outcomes and improved self management. Long Term Goals: To be accomplished in 4 weeks: 1. Patient will report FOTO score of 74 or better to indicate significant improvement in functional status. 2. Pt will demonstrate 4+/5 right shoulder ER and flexion to improve scapulohumeral mechanics. 3. Pt will demonstrate full cervical AROM void of pain. 4. Pt will report 50% improvement in symptoms to improve QOL. Frequency / Duration: Patient to be seen 2 times per week for 4 weeks. Patient/ Caregiver education and instruction: Diagnosis, prognosis, self care, activity modification and exercises 
 [x]  Plan of care has been reviewed with ANNAMARIA Coats, PT, DPT, ATC 4/8/2019 6:57 PM 
 
________________________________________________________________________ I certify that the above Therapy Services are being furnished while the patient is under my care.  I agree with the treatment plan and certify that this therapy is necessary. [de-identified] Signature:____________Date:_________TIME:________ 
 
[facility], Date and Time must be completed for valid certification ** Please sign and return to In [address] [place], [address] 
[phone] fax

## 2019-04-08 NOTE — PROGRESS NOTES
PT DAILY TREATMENT NOTE/CERVICAL [id number] Patient Name: Radha Maurice Date:2019 : 1950 [x]  Patient  Verified Payor: [place] MEDICARE / Plan: [address] / Product Type: Medicare / In time:2:38pm  Out time:3:20pm 
Total Treatment Time (min): 42 Visit #: 1 of 8 Medicare/BCBS Only Total Timed Codes (min):  25 1:1 Treatment Time:  43 Treatment Area: Cervicalgia [M54.2] Upper back pain [M54.9] SUBJECTIVE Pain Level (0-10 scale): 3/10 [x]constant []intermittent [x]improving []worsening []no change since onset Any medication changes, allergies to medications, adverse drug reactions, diagnosis change, or new procedure performed?: [x] No    [] Yes (see summary sheet for update) Subjective functional status/changes: 
Pt reports onset of right sided neck pain 3-4 weeks ago. She reports it tends to ache constantly, but can increase in severity with prolonged seated computer work (taking classes at the moment) as well as when performing pushups in the gym. She reports she has had this problem occasionally in the past, and it has responded well to PT. 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 
Pt Goals: see intake Barriers: []pain []financial []time []transportation []other Motivation: appears motivated and cooperative Substance use: []Alcohol []Tobacco []other:  
FABQ Score: []low []elevate Cognition: A & O x     Other: OBJECTIVE/EXAMINATION Domestic Life:  
Activity/Recreational Limitations: Mobility:  
Self Care:  
 
17 min [x]Eval                  []Re-Eval  
 
16 min Therapeutic Exercise:  [x] See flow sheet : HEP creation and instruction per handout Rationale: increase ROM and increase strength to improve the patients ability to improve ease of daily activity. Self Care: 9 minutes pt education regarding relevant anatomy, diagnosis, prognosis, plan of care, activity modification, workstation ergonomics, self modality use to facilitate pt self management. With 
 [] TE 
 [] TA 
 [] neuro 
 [] other: Patient Education: [x] Review HEP [] Progressed/Changed HEP based on:  
[] positioning   [] body mechanics   [] transfers   [] heat/ice application   
[] other:   
 
Other Objective/Functional Measures: 
 
Physical Therapy Evaluation Cervical Spine SUBJECTIVE Chief Complaint: 
 
Mechanism of injury: 
 
Symptoms Aggravated by: 
 [] Bending [x] Sitting [] Standing [] Reaching Overhead 
 [] Moving [] Cough [] Sneeze [] Eating 
 [] AM  [] PM  Lying:  [] sup   [] pro   [] sidelying 
 [x] Other: some gym exercises Eased by:  
 [] Bending [] Sitting [] Standing Lying: [] sup  [] pro  [] sidelying 
 [] Moving [] AM  [] PM  [] Other: 
  
General Health:  Red Flags Indicated? [] Yes    [x] No 
[] Yes [] No Recent weight change (If yes, due to dieting? [] Yes  [] No) [] Yes [] No Persistent cough 
[] Yes [] No Unremitting pain at night 
[] Yes [] No Dizziness 
[] Yes [] No Blurred vision 
[] Yes [] No Hands more cold or painful in cold weather 
[] Yes [] No Ringing in ears 
[] Yes [] No Difficulty swallowing 
[] Yes [] No Dysfunction of bowel or bladder 
[] Yes [] No Recent illness within past 3 weeks (i.e, cold, flu) 
[] Yes [] No Jaw pain Past History/Treatments: 
 
Diagnostic Tests: [] Lab work [] X-rays    [] CT [] MRI     [] Other: 
Results: none Functional Status Prior level of function: 
Present functional limitations: 
What position do you sleep in?: 
 
 Headaches: Do you have headaches? [] Yes   [x] No 
How often do you get headaches? How long does the headache last? 
What aggravates it? What relieves it? Does the headache coincide with any other symptoms (visual disturbances, light sensitivity)? Where is the headache? Does it change locations? Other: OBJECTIVEPosture: [] WNL Head Position: mild forward head Shoulder/Scapular Position: 
C-Kyphosis:  [] increased   [] decreased C-Lordosis:   [] increased   [] decreased T-Kyphosis:  [x] increased   [] decreased T-Lordosis:   [] increased   [] decreased TMJ: [] N/A [] Abnormal - ROM: 
 Palpation: 
 
Cervical Retraction: [] WNL    [] Abnormal: limited ROM Shoulder/Scapular Screen: [] WNL    [] Abnormal: right FIR: T10, left FIR T4 Active Movements: [] N/A   [] Too acute   [] Other: ROM % AROM % PROM Comments:pain, area Forward flexion 54 Extension 50 SB right 30  Right neck discomfort SB left  34 Rotation right 74  Right neck discomfort Rotation left 78 Thoracic Spine: [] N/A    [] WNL   [x] Other: mild limitations in PROM: 
 
Palpation: 
[] Min  [x] Mod  [] Severe    Location: right upper trapezius, right levator scapula [] Min  [] Mod  [] Severe    Location: 
[] Min  [] Mod  [] Severe    Location:  
 
Neuro Screen (myotome/dematome/felexes): [x] WNL Myotome Level Muscle Test Myotome Level Muscle Test  
C5 Shoulder Adduction - Deltoid C8 Finger Flexors C6 Wrist Extension T1 Finger Abduction - Interossei C7 Elbow Extension Comments: 
Upper Limb Tension Tests: [] N/A Ulnar: [] R    [] L    [] +    [] - Median: [] R    [] L    [] +    [] - Radial: [] R    [] L    [] +    [] - Special Tests:  Cervical:  
     Vertebral Artery:  [] R    [] L    [] +    [] - Alar Ligament: [] R    [] L    [] +    [x] - Transverse Lig: [] R    [] L    [] +    [] -      Spurling's:  [] R    [] L    [] +    [x] - 
 Distraction:  [] R    [] L    [] +    [x] - Compression: [] R    [] L    [] +    [x] - Thoracic Outlet Tests: [] N/A Adson's:  [] R    [] L    [] +    [] - Hyperabduction: [] R    [] L    [] +    [] - Karan's:  [] R    [] L    [] +    [] - Veneda Aliment:  [] R    [] L    [] +    [] - 
 
Diaphragmatic Breathing: [] Normal    [] Abnormal 
 
Muscle Flexibility: [] N/A Scalenes: [x] WNL    [x] Tight    [x] R    [] L Upper Trap: [x] WNL    [x] Tight    [x] R    [] L Levator: [x] WNL    [x] Tight    [x] R    [] L Pect. Minor: [] WNL    [] Tight    [] R    [] L Global Muscular Weakness: [] N/A Lower Trap:4/5 Rhomboids: 
 Middle Trap: 4/5 right 4+/5 left Serratus Ant: 5/5 bilat Ext Rotators: 4/5 right, 4+/5 left Other: 
 
Other tests/comments: 
Mildly kyphotic, limited thoracic segmental mobility Pain Level (0-10 scale) post treatment: 3 
 
ASSESSMENT/Changes in Function: Per POC. Patient will continue to benefit from skilled PT services to modify and progress therapeutic interventions, address functional mobility deficits, address ROM deficits, address strength deficits, analyze and address soft tissue restrictions, analyze and cue movement patterns and instruct in home and community integration to attain remaining goals. [x]  See Plan of Care 
[]  See progress note/recertification 
[]  See Discharge Summary Progress towards goals / Updated goals: 
Per POC. PLAN [x]  Upgrade activities as tolerated     [x]  Continue plan of care 
[]  Update interventions per flow sheet      
[]  Discharge due to:_ 
[]  Other:_ Sirisha Pugh, PT, DPT, ATC 4/8/2019  2:45 PM

## 2019-04-11 ENCOUNTER — HOSPITAL ENCOUNTER (OUTPATIENT)
Dept: PHYSICAL THERAPY | Age: 69
Discharge: HOME | End: 2019-04-11
Payer: MEDICARE

## 2019-04-11 PROCEDURE — 97110 THERAPEUTIC EXERCISES: CPT

## 2019-04-11 PROCEDURE — 97140 MANUAL THERAPY 1/> REGIONS: CPT

## 2019-04-11 NOTE — PROGRESS NOTES
PT DAILY TREATMENT NOTE - [facility]  Patient Name: Macho Jolley Date:2019 : 1950 [x]  Patient  Verified Payor: [place] MEDICARE / Plan: Selina Jimenez / Product Type: Medicare / In time:3:01pm  Out time:3:44pm 
Total Treatment Time (min): 43 Total Timed Codes (min): 43 
1:1 Treatment Time ([address] only): 30 Visit #: 2 of 8 Treatment Area: Cervicalgia [M54.2] Upper back pain [M54.9] SUBJECTIVE Pain Level (0-10 scale): 3 Any medication changes, allergies to medications, adverse drug reactions, diagnosis change, or new procedure performed?: [x] No    [] Yes (see summary sheet for update) Subjective functional status/changes:   [x] No changes reported OBJECTIVE 34 min Therapeutic Exercise:  [x] See flow sheet :  
Rationale: increase ROM and increase strength to improve the patients ability to improve ease of daily activity. 9 min Manual Therapy:  STM right C/S Rationale: decrease pain, increase ROM and increase tissue extensibility to improve ease of daily activity. With 
 [] TE 
 [] TA 
 [] neuro 
 [] other: Patient Education: [x] Review HEP [] Progressed/Changed HEP based on:  
[] positioning   [] body mechanics   [] transfers   [] heat/ice application   
[] other:   
 
Other Objective/Functional Measures: TTP right upper trap, levator scapula, scalenes, and suboccipital musculature on right Pain Level (0-10 scale) post treatment: 3 
 
ASSESSMENT/Changes in Function: Pt reports no change in symptoms post first f/u. She demonstrates good technique after initial instruction, though TTP right C/S musculature. Continue per POC.  
 
Patient will continue to benefit from skilled PT services to modify and progress therapeutic interventions, address functional mobility deficits, address ROM deficits, address strength deficits, analyze and address soft tissue restrictions, analyze and cue movement patterns and instruct in home and community integration to attain remaining goals. []  See Plan of Care 
[]  See progress note/recertification 
[]  See Discharge Summary Progress towards goals / Updated goals: 
Short Term Goals: To be accomplished in 2 weeks: 1. Patient will report performance of HEP at least 2 times per day to facilitate improved outcomes and improved self management. 
  
Long Term Goals: To be accomplished in 4 weeks: 1. Patient will report FOTO score of 74 or better to indicate significant improvement in functional status. 2. Pt will demonstrate 4+/5 right shoulder ER and flexion to improve scapulohumeral mechanics. 3. Pt will demonstrate full cervical AROM void of pain. 4. Pt will report 50% improvement in symptoms to improve QOL. Frequency / Duration: Patient to be seen 2 times per week for 4 weeks. PLAN [x]  Upgrade activities as tolerated     [x]  Continue plan of care 
[]  Update interventions per flow sheet      
[]  Discharge due to:_ 
[]  Other:_ Darby Ba, PT, DPT, ATC 4/11/2019  3:26 PM 
 
Future Appointments Date Time Provider Meghna Moreno 4/15/2019  2:30 PM [facility]  
4/25/2019  2:30 PM [facility]  
4/30/2019  2:30 PM [facility]  
5/2/2019  2:00 PM Hiro Richardson [facility]  
5/7/2019  2:00 PM [facility]  
5/9/2019  2:00 PM Hiro Richardson [facility]

## 2019-04-15 ENCOUNTER — HOSPITAL ENCOUNTER (OUTPATIENT)
Dept: PHYSICAL THERAPY | Age: 69
Discharge: HOME | End: 2019-04-15
Payer: MEDICARE

## 2019-04-15 PROCEDURE — 97110 THERAPEUTIC EXERCISES: CPT

## 2019-04-15 PROCEDURE — 97140 MANUAL THERAPY 1/> REGIONS: CPT

## 2019-04-15 NOTE — PROGRESS NOTES
PT DAILY TREATMENT NOTE - [facility]  Patient Name: Bridger Everett Date:4/15/2019 : 1950 [x]  Patient  Verified Payor: [place] / Plan: Selina Jimenez / Product Type: Medicare / In time:2:30pm  Out time:3:13pm 
Total Treatment Time (min): 43 Total Timed Codes (min): 43 
1:1 Treatment Time ( [address] only): 30 Visit #: 3 of 8 Treatment Area: Cervicalgia [M54.2] Upper back pain [M54.9] SUBJECTIVE Pain Level (0-10 scale): 2 Any medication changes, allergies to medications, adverse drug reactions, diagnosis change, or new procedure performed?: [x] No    [] Yes (see summary sheet for update) Subjective functional status/changes:   [] No changes reported \"My neck's not too bad today, but my shoulder is sore. \" Pt reports shoulder soreness laterally with onset on 2019 in the morning. OBJECTIVE 35 min Therapeutic Exercise:  [x] See flow sheet :  
Rationale: increase ROM and increase strength to improve the patients ability to improve ease of daily activity. 8 min Manual Therapy:  Right C/S STM/TPR, SOR, cervical sideglides and segmental mobilizations. Rationale: decrease pain, increase ROM and increase tissue extensibility to improve ease of daily activity. With 
 [] TE 
 [] TA 
 [] neuro 
 [] other: Patient Education: [x] Review HEP [] Progressed/Changed HEP based on:  
[] positioning   [] body mechanics   [] transfers   [] heat/ice application   
[] other:   
 
Other Objective/Functional Measures: TTP Right scalenes, SCM, UT, limited right end range cervical rotation and SB Pain Level (0-10 scale) post treatment: 3 
 
ASSESSMENT/Changes in Function: Pt reports minimal change in symptoms post treatment with mild end range limitations in right C/S SB and rotation. Continue per POC.  
 
Patient will continue to benefit from skilled PT services to modify and progress therapeutic interventions, address functional mobility deficits, address ROM deficits, address strength deficits, analyze and address soft tissue restrictions, analyze and cue movement patterns and instruct in home and community integration to attain remaining goals. []  See Plan of Care 
[]  See progress note/recertification 
[]  See Discharge Summary Progress towards goals / Updated goals: 
Short Term Goals: To be accomplished in 2 weeks: 
             1. Patient will report performance of HEP at least 2 times per day to facilitate improved outcomes and improved self management. 
  
Long Term Goals: To be accomplished in 4 weeks: 
             1. Patient will report FOTO score of 74 or better to indicate significant improvement in functional status.              2. Pt will demonstrate 4+/5 right shoulder ER and flexion to improve scapulohumeral mechanics.              3. Pt will demonstrate full cervical AROM void of pain. 
             4. Pt will report 50% improvement in symptoms to improve QOL. Frequency / Duration: Patient to be seen 2 times per week for 4 weeks. PLAN [x]  Upgrade activities as tolerated     [x]  Continue plan of care 
[]  Update interventions per flow sheet      
[]  Discharge due to:_ 
[]  Other:_ Jose Steinberg 4/15/2019  2:54 PM 
 
Future Appointments Date Time Provider Meghna Moreno 4/25/2019  2:30 PM David Eric [facility]  
4/30/2019  2:30 PM David Eric [facility]  
5/2/2019  2:00 PM Melissa Zarate [facility]  
5/7/2019  2:00 PM David Eric [facility]  
5/9/2019  2:00 PM Melissa Zarate [facility]

## 2019-04-25 ENCOUNTER — HOSPITAL ENCOUNTER (OUTPATIENT)
Dept: PHYSICAL THERAPY | Age: 69
Discharge: HOME | End: 2019-04-25
Payer: MEDICARE

## 2019-04-25 PROCEDURE — 97140 MANUAL THERAPY 1/> REGIONS: CPT

## 2019-04-25 PROCEDURE — 97110 THERAPEUTIC EXERCISES: CPT

## 2019-04-25 NOTE — PROGRESS NOTES
PT DAILY TREATMENT NOTE 10-18 Patient Name: Ramón Shea Date:2019 : 1950 [x]  Patient  Verified Payor: [place] MED[place] / Plan: Selina Tam y / Product Type: Medicare / In time:430  Out time:509 Total Treatment Time (min): 39 Visit #: 4 of 8 Medicare/BCBS Only Total Timed Codes (min):  39 1:1 Treatment Time:  44 Treatment Area: Cervicalgia [M54.2] Upper back pain [M54.9] SUBJECTIVE Pain Level (0-10 scale): 3 Any medication changes, allergies to medications, adverse drug reactions, diagnosis change, or new procedure performed?: [x] No    [] Yes (see summary sheet for update) Subjective functional status/changes:   [] No changes reported No changes since onset. Pt also reports her spouse says her posture hasn't improved OBJECTIVE 8 min Manual Therapy:  Per flow sheet Rationale: decrease pain, increase ROM and increase tissue extensibility to increase ease of ADLs 31 min Therapeutic Exercise:  [x] See flow sheet :  
Rationale: increase ROM and increase strength to improve the patients ability to self manage pain and disabilty With 
 [] TE 
 [] TA 
 [] neuro 
 [] other: Patient Education: [x] Review HEP [] Progressed/Changed HEP based on:  
[] positioning   [] body mechanics   [] transfers   [] heat/ice application   
[] other:   
 
Other Objective/Functional Measures: Held most shoulder exercise due to very poor postural alignment and pain. Pt was placed in supine to work on suboccipital stretching and longus coli activation and MET to correct her upper cervical rotation. Once she did that, cervical AROM was full (B) and her forward head position improved 75%. HA pain resolved. Pain Level (0-10 scale) post treatment: 1, 2 at end range shoulder rotation only which was 80 degrees (B) after treatment ASSESSMENT/Changes in Function: Pt showed improvement today post treatment. Updated HEP for suboccipital self release and longus coli activation. Also discussed DN which patient will receive in 1 week. Patient will continue to benefit from skilled PT services to modify and progress therapeutic interventions, address functional mobility deficits, address ROM deficits, address strength deficits, analyze and address soft tissue restrictions, analyze and cue movement patterns, analyze and modify body mechanics/ergonomics and assess and modify postural abnormalities to attain remaining goals. []  See Plan of Care 
[]  See progress note/recertification 
[]  See Discharge Summary Progress towards goals / Updated goals: 
Short Term Goals: To be accomplished in 2 weeks: 
             1. Patient will report performance of HEP at least 2 times per day to facilitate improved outcomes and improved self management. Met 4/25/2019 
  
Long Term Goals: To be accomplished in 4 weeks: 
             1. Patient will report FOTO score of 74 or better to indicate significant improvement in functional status.              2. Pt will demonstrate 4+/5 right shoulder ER and flexion to improve scapulohumeral mechanics.              3. Pt will demonstrate full cervical AROM void of pain. Improving - able to demo full rotation with discomfort at end range only 4/25/2019 
             4. Pt will report 50% improvement in symptoms to improve QOL. PLAN 
[]  Upgrade activities as tolerated     [x]  Continue plan of care 
[]  Update interventions per flow sheet      
[]  Discharge due to:_ 
[]  Other:_ Ricky Betancur 4/25/2019  4:37 PM 
 
Future Appointments Date Time Provider Meghna Moreno 4/30/2019  2:30 PM Ellen Marsh [facility]  
5/2/2019  2:00 PM Justo Wills [facility]  
5/7/2019  2:00 PM Ellen Marsh [facility]  
5/9/2019  2:00 PM Justo Wills [facility]

## 2019-04-30 ENCOUNTER — HOSPITAL ENCOUNTER (OUTPATIENT)
Dept: PHYSICAL THERAPY | Age: 69
Discharge: HOME | End: 2019-04-30
Payer: MEDICARE

## 2019-04-30 PROCEDURE — 97140 MANUAL THERAPY 1/> REGIONS: CPT

## 2019-04-30 NOTE — PROGRESS NOTES
PT DAILY TREATMENT NOTE - [facility]  Patient Name: Jose Alvarado Date:2019 : 1950 [x]  Patient  Verified Payor: [place] MED[place]ARE / Plan: Selina Jimenez / Product Type: Medicare / In time: 2:30pm  Out time 3:10pm 
Total Treatment Time (min): 40 Total Timed Codes (min): 40 
1:1 Treatment Time ([facility] only): 20 minutes Visit #: 5 of 8 Treatment Area: Cervicalgia [M54.2] Upper back pain [M54.9] SUBJECTIVE Pain Level (0-10 scale): 2 Any medication changes, allergies to medications, adverse drug reactions, diagnosis change, or new procedure performed?: [x] No    [] Yes (see summary sheet for update) Subjective functional status/changes:   [] No changes reported Pt reports bilat lower neck pain and stiffness. She reports limited change to date, though limited pain overall. OBJECTIVE 28 min Therapeutic Exercise:  [x] See flow sheet :  
Rationale: increase ROM and increase strength to improve the patients ability to improve ease of daily activity. 12 min Manual Therapy:  C/S STM emphasis scalenes and upper trapezius on right side, T/S CPA mobilizations, rib spring mobilizations Rationale: decrease pain, increase ROM and increase tissue extensibility to improve ease of daily activity. With 
 [] TE 
 [] TA 
 [] neuro 
 [] other: Patient Education: [x] Review HEP [] Progressed/Changed HEP based on:  
[] positioning   [] body mechanics   [] transfers   [] heat/ice application   
[] other:   
 
Other Objective/Functional Measures: TTP B Scalenes and upper trap, more so on the right side, TTP right levator scapula and right suboccipitals Requires cueing to facilitate relaxation as well as upper cervical flexion Pain Level (0-10 scale) post treatment: 2 
 
ASSESSMENT/Changes in Function: Pt demonstrates TTP throughout multiple cervical musculature as well as some capital extension postural tendencies.  She responds well to initial cueing for capital flexion, though limited carryover. Consider DN for myofascial involvement in upcoming visits. Patient will continue to benefit from skilled PT services to modify and progress therapeutic interventions, address functional mobility deficits, address ROM deficits, address strength deficits, analyze and address soft tissue restrictions, analyze and cue movement patterns and instruct in home and community integration to attain remaining goals. []  See Plan of Care 
[]  See progress note/recertification 
[]  See Discharge Summary Progress towards goals / Updated goals: 
Short Term Goals: To be accomplished in 2 weeks: 
             1. Patient will report performance of HEP at least 2 times per day to facilitate improved outcomes and improved self management. Met 4/25/2019 
  
Long Term Goals: To be accomplished in 4 weeks: 
             1. Patient will report FOTO score of 74 or better to indicate significant improvement in functional status.              2. Pt will demonstrate 4+/5 right shoulder ER and flexion to improve scapulohumeral mechanics.              3. Pt will demonstrate full cervical AROM void of pain. Improving - able to demo full rotation with discomfort at end range only 4/25/2019 
             4. Pt will report 50% improvement in symptoms to improve QOL. PLAN [x]  Upgrade activities as tolerated     [x]  Continue plan of care 
[]  Update interventions per flow sheet      
[]  Discharge due to:_ 
[]  Other:_ Caroline Cortez, PT, DPT, ATC 4/30/2019  3:09 PM 
 
Future Appointments Date Time Provider Meghna Moreno 5/2/2019  2:00 PM Mustapha [address] [facility]  
5/7/2019  [facility]  
5/9/2019  2:00 PM Yohan Gilbert [facility]

## 2019-05-02 ENCOUNTER — HOSPITAL ENCOUNTER (OUTPATIENT)
Dept: PHYSICAL THERAPY | Age: 69
Discharge: HOME | End: 2019-05-02
Payer: MEDICARE

## 2019-05-02 PROCEDURE — 97140 MANUAL THERAPY 1/> REGIONS: CPT

## 2019-05-02 PROCEDURE — 97110 THERAPEUTIC EXERCISES: CPT

## 2019-05-02 NOTE — PROGRESS NOTES
PT DAILY TREATMENT NOTE 10-18 Patient Name: Reyes Capone Date:2019 : 1950 [x]  Patient  Verified Payor: [place] MEDICARE / Plan: Selina Avila / Product Type: Medicare / In time:3:00  Out time:4:04 Total Treatment Time (min): 64 Visit #: 6 of 8 Medicare/BCBS Only Total Timed Codes (min):  54 1:1 Treatment Time:  44 Treatment Area: Cervicalgia [M54.2] Upper back pain [M54.9] SUBJECTIVE Pain Level (0-10 scale): 2 Any medication changes, allergies to medications, adverse drug reactions, diagnosis change, or new procedure performed?: [x] No    [] Yes (see summary sheet for update) Subjective functional status/changes:   [] No changes reported Pt reports that she is experiencing nagging type pain through right levator scap region OBJECTIVE Modality rationale: decrease inflammation and decrease pain to improve post needling soreness with ADLs Min Type Additional Details  
 [] Estim:  []Unatt       []IFC  []Premod []Other:  []w/ice   []w/heat Position: Location:  
 [] Estim: []Att    []TENS instruct  []NMES []Other:  []w/US   []w/ice   []w/heat Position: Location:  
 []  Traction: [] Cervical       []Lumbar 
                     [] Prone          []Supine []Intermittent   []Continuous Lbs: 
[] before manual 
[] after manual  
 []  Ultrasound: []Continuous   [] Pulsed []1MHz   []3MHz W/cm2: 
Location:  
 []  Iontophoresis with dexamethasone Location: [] Take home patch  
[] In clinic  
10 [x]  Ice     []  heat 
[]  Ice massage 
[]  Laser  
[]  Anodyne Position: supine Location: cervical  
 []  Laser with stim 
[]  Other:  Position: Location:  
 []  Vasopneumatic Device Pressure:       [] lo [] med [] hi  
Temperature: [] lo [] med [] hi  
[] Skin assessment post-treatment:  []intact []redness- no adverse reaction 
  []redness  adverse reaction: 28 min Therapeutic Exercise:  [x] See flow sheet : including extensive DN education and post needling instruction Rationale: increase ROM and increase strength to improve the patients ability to perform daily tasks and self care 10 min Neuromuscular Re-education:  []  See flow sheet :  
Rationale: improve coordination and increase proprioception  to improve the patients ability to perform functional tasks with improved cervicothoracic mechanics and stability 16 min Manual Therapy:  T/s PA's & rib springs grade II-III, upper cervical UPA's and side glides, DN palpation and setup Rationale: increase ROM and increase tissue extensibility to improve ease of ADL performance Dry Needling Procedure Note Procedure: An intramuscular manual therapy (dry needling) and a neuro-muscular re-education treatment was done to deactivate myofascial trigger points with a 30 gauge filament needle under aseptic technique. Indications: 
[x] Myofascial pain and dysfunction [] Muscled spasms [] Myalgia/myositis   [] Muscle cramps [x] Muscle imbalances  [] Temporomandibular Dysfunction 
[] Other: 
 
Chart reviewed for the following: 
I, Wynona Blizzard, have reviewed the medical history, summary list and precautions/contraindications for [address] TIME OUT performed immediately prior to start of procedure: 
I, Wynona Blizzard, have performed the following reviews on [address] prior to the start of the session:     
[x] Verified patient identification by name and date of birth   
[x] Agreement on all muscles being treated was verified  
[x] Purpose of dry needling, side effects, possible complications, risks and benefits were explained to the patient [x] Procedure site(s) verified 
[x] Patient was positioned for comfort and draped for privacy [x] Informed Consent was signed (initial visit) and verified verbally (subsequent visits) [x] Patient was instructed on the need to report the use of blood thinners and/or immunosuppressant medications [x] How to respond to possible adverse effects of treatment 
[x] Self treatment of post needling soreness: ice, heat (moist heat, heat wraps) and stretching 
[x] Opportunity was given to ask any questions, all questions were answered Time: 3:23 Date of procedure: 5/2/2019 Treatment: The following muscles were treated today with intramuscular dry needling 
[] Left [] Right Masster 
[] Left [] Right Temporalis 
[] Left [] Right Zygomaticus Major / Minor 
[] Left [] Right Lateral Pterygoid 
[] Left [] Right Medial Pterygoid 
[] Left [] Right Digastric Post / Anterior Belly 
[] Left [] Right Sternocleidomastoid 
[] Left [] Right Scalene Anterior / Medial / Posterior 
[] Left [] Right Extra Laryngeal Muscles 
[] Left [] Right Upper Trapezius 
[] Left [] Right Middle Trapezius 
[] Left [] Right Lower Trapezius 
[] Left [] Right Oblique Capitis Inferior 
[] Left [] Right Splenius Capitis / Cervicis 
[] Left [] Right Semispinalis: Capitis / Cervicis 
[] Left [] Right Multifidi / Rotatores Cervicis / Thoracic 
[] Left [] Right Longissimus Thoracis / Illiocostalis 
[] Left [x] Right Levator Scapulae 
[] Left [] Right Supraspinatus / Infraspinatus 
[] Left [] Right Teres Major / Minor 
[] Left [] Right Rhomboids / Serratus posterior superior 
[] Left [] Right Pectoralis Major / Minor 
[] Left [] Right Serratus Anterior 
[] Left [] Right Latissimus Dorsi 
[] Left [] Right Subscapularis 
[] Left [] Right Coracobrachialis 
[] Left [] Right Biceps Brachii 
[] Left [] Right Deltoid: Anterior / Medial / Posterior 
[] Left [] Right Brachialis 
[] Left [] Right Triceps 
[] Left [] Right Brachioradialis 
[] Left [] Right Extensor Carpi Radialis Brevis / Extensor Carpi Radialis Longus    [] Left [] Right  Extensor digitorum 
[] Left [] Right Supinator / Pronator Teres 
[] Left [] Right Flexor Carpi Radialis/ Flexor Carpi Ulnaris [] Left [] Right  Flexor Digitorum Superficialis/ Flexor Digitorum Profundus 
[] Left [] Right Flexor Pollicis Longus / Flexor Pollicis Brevis / Palmaris Longus 
[] Left [] Right Abductor Pollicis Longus / Abductor Pollicis Brevis 
[] Left [] Right Opponens Pollicis / Adductor Pollicis 
[] Left [] Right Dorsal / Palmar Interossei / Lumbricalis 
[] Left [] Right Abductor Digiti Minimi / Opponens Digiti Minimi Patient's response to today's treatment: 
[x] Latent Twitch Response     [] Muscle relaxation [] Pain Relief 
[] Post needling soreness    [x] without complications 
[] Increased Range of Motion   [] Decreased headaches   
[] Decreased Tinnitus [x] Other: poor tolerance to twitch response Performed by: Abdias Gonzalez, RENATE, CMTPT With 
 [] TE 
 [] TA 
 [] neuro 
 [] other: Patient Education: [x] Review HEP [] Progressed/Changed HEP based on:  
[] positioning   [] body mechanics   [] transfers   [] heat/ice application   
[] other:   
 
Other Objective/Functional Measures: Pt requests specifically that PT wash hands before gloving up, informed pt that hand  is better germ killer but pt insists on hand washing, thus PT washed hands prior Pain Level (0-10 scale) post treatment: 5-6 
 
ASSESSMENT/Changes in Function: Extensive education regarding DN, pt seems rather disinterested in the treatment but states she will try it. Poor overall tolerance to twitch response with pt requesting to terminate needling for today after 1 needle in levator scap.  Pt still presenting with cervical rotations to left, would benefit from further DN and manual. 
 
Patient will continue to benefit from skilled PT services to modify and progress therapeutic interventions, address functional mobility deficits, address ROM deficits, address strength deficits, analyze and address soft tissue restrictions, analyze and cue movement patterns, analyze and modify body mechanics/ergonomics and assess and modify postural abnormalities to attain remaining goals. []  See Plan of Care 
[]  See progress note/recertification 
[]  See Discharge Summary Progress towards goals / Updated goals: 
Short Term Goals: To be accomplished in 2 weeks: 
             1. Patient will report performance of HEP at least 2 times per day to facilitate improved outcomes and improved self management. Met 4/25/2019 
  
Long Term Goals: To be accomplished in 4 weeks: 
             1. Patient will report FOTO score of 74 or better to indicate significant improvement in functional status.              2. Pt will demonstrate 4+/5 right shoulder ER and flexion to improve scapulohumeral mechanics.              3. Pt will demonstrate full cervical AROM void of pain. Improving - able to demo full rotation with discomfort at end range only 4/25/2019 
             4. Pt will report 50% improvement in symptoms to improve QOL. Not met to date mostly unchanged 5/2/19 PLAN 
[]  Upgrade activities as tolerated     [x]  Continue plan of care 
[]  Update interventions per flow sheet      
[]  Discharge due to:_ 
[]  Other:_ Cecilia Quezada DPT, CMTPT 5/2/2019  3:08 PM 
 
Future Appointments Date Time Provider Meghna Moreno 5/7/2019  2:00 PM Debra Colvin [facility]  
5/9/2019  2:00 PM Kodak Hernandez [facility]

## 2019-05-07 ENCOUNTER — HOSPITAL ENCOUNTER (OUTPATIENT)
Dept: PHYSICAL THERAPY | Age: 69
Discharge: HOME | End: 2019-05-07
Payer: MEDICARE

## 2019-05-07 PROCEDURE — 97140 MANUAL THERAPY 1/> REGIONS: CPT

## 2019-05-07 PROCEDURE — 97112 NEUROMUSCULAR REEDUCATION: CPT

## 2019-05-07 NOTE — PROGRESS NOTES
PT DAILY TREATMENT NOTE - [facility]  Patient Name: Ruthy Beard Date:2019 : 1950 [x]  Patient  Verified Payor: [place] MED[place]ARE / Plan: Selina Avilay / Product Type: Medicare / In time: 2:03pm  Out time:2:58pm 
Total Treatment Time (min): 55 Total Timed Codes (min): 55 
1:1 Treatment Time ( [address] only): 29 Visit #: 7 of 8 Treatment Area: Cervicalgia [M54.2] Upper back pain [M54.9] SUBJECTIVE Pain Level (0-10 scale): 2 Any medication changes, allergies to medications, adverse drug reactions, diagnosis change, or new procedure performed?: [x] No    [] Yes (see summary sheet for update) Subjective functional status/changes:   [] No changes reported Pt reports pain is \"getting better\" but still reports some right sided neck/shoulder aching pain. OBJECTIVE 23 min Therapeutic Exercise:  [x] See flow sheet :  
Rationale: increase ROM and increase strength to improve the patients ability to improve ease of daily activity. 24 min Neuromuscular Re-education:  [x]  See flow sheet :  
Rationale: increase strength, improve coordination and increase proprioception  to improve the patients ability to improve ease of daily activity and improve upright posture. 8 min Manual Therapy:  Right UT and LS STM/TPR, prone pec minor stretch Rationale: decrease pain, increase ROM and increase tissue extensibility to improve scapular mobility and reduce pain. With 
 [] TE 
 [] TA 
 [] neuro 
 [] other: Patient Education: [x] Review HEP [] Progressed/Changed HEP based on:  
[] positioning   [] body mechanics   [] transfers   [] heat/ice application   
[] other:   
 
Other Objective/Functional Measures: Cervical AROM: rotation right 77, left 73, SB 30 bilat, ext 57, flex 62 Right shoulder flexion MMT: 4/5 Right shoulder ER MMT: 4/5 Large TrP noted in right UT and levator scapula, several small latent in rhomboids and mid trap Mild forward head and capitally extended posture with mild round shoulders, requires extensive tactile and verbal cueing for neutral posture Pain Level (0-10 scale) post treatment: 2 
 
ASSESSMENT/Changes in Function: Pt reports some mild improvement in symptoms, with no pain provocation reported with C/S AROM which appears full, however, she reports some continued right shoulder/neck pain with associated myofascial involvement in her right upper trapezius and levator scapula with some mild right shoulder ER & flexion weakness. She also demonstrates forward head and shoulder postural tendencies with limited carryover noted despite extensive cueing. She will benefit from continued PT to address her impairments. Patient will continue to benefit from skilled PT services to modify and progress therapeutic interventions, address functional mobility deficits, address ROM deficits, address strength deficits, analyze and address soft tissue restrictions, analyze and cue movement patterns, assess and modify postural abnormalities and instruct in home and community integration to attain remaining goals. []  See Plan of Care [x]  See progress note/recertification  
[]  See Discharge Summary Progress towards goals / Updated goals: 
Short Term Goals: To be accomplished in 2 weeks: 
             1. Patient will report performance of HEP at least 2 times per day to facilitate improved outcomes and improved self management. Met 4/25/2019 
  
Long Term Goals: To be accomplished in 4 weeks: 
             1. Patient will report FOTO score of 74 or better to indicate significant improvement in functional status. Decline of 2 points, not consistent with reports of improvement 5/7/2019 
             2. Pt will demonstrate 4+/5 right shoulder ER and flexion to improve scapulohumeral mechanics. Not met 4/5 flex and ER 5/7/2019 
             3. Pt will demonstrate full cervical AROM void of pain. Improving - able to demo full rotation with discomfort at end range only 4/25/2019. Met no pain provocation with C/S ROM, though pain at rest Cervical AROM: rotation right 77, left 73, SB 30 bilat, ext 57, flex 62  5/7/2019 
             4. Pt will report 50% improvement in symptoms to improve QOL. Not met to date mostly unchanged 5/2/19 PLAN [x]  Upgrade activities as tolerated     [x]  Continue plan of care 
[]  Update interventions per flow sheet      
[]  Discharge due to:_  
[]  Other:_ Abdi Thurman, PT, DPT, ATC 5/7/2019  2:08 PM 
 
Future Appointments Date Time Provider Meghna Moreno 5/9/2019  2:00 PM Yoon Smith [facility]

## 2019-05-07 NOTE — PROGRESS NOTES
[address] [place], [address] 
[phone] fax Continued Plan of Care/ Re-certification for Physical Therapy Services Patient name: Christofer Langston Start of Care: 2019 Referral source: Russell Worley MD : 1950 Medical/Treatment Diagnosis: Cervicalgia [M54.2] Upper back pain [M54.9] Payor: [place] MEDICARE / Plan: [facility] / Product Type: Medicare /  Onset Date:3-4 weeks ago Prior Hospitalization: see medical history Provider#: [id number] Medications: Verified on Patient Summary List   
Comorbidities: colonoscopy, GERD, hiatal hernia, otherwise unremarkable Prior Level of Function: gym 4 days/week, exercise with light weight void of limitations, deskwork void of pain Visits from Start of Care: 7    Missed Visits: 0 The Plan of Care and following information is based on the patient's current status: 
Progress towards goals / Updated goals: 
Short Term Goals: To be accomplished in 2 weeks: 
             1. Patient will report performance of HEP at least 2 times per day to facilitate improved outcomes and improved self management. Met 2019 
  
Long Term Goals: To be accomplished in 4 weeks: 
             1. Patient will report FOTO score of 74 or better to indicate significant improvement in functional status. Decline of 2 points, not consistent with reports of improvement 2019 
             2. Pt will demonstrate 4+/5 right shoulder ER and flexion to improve scapulohumeral mechanics. Not met /5 flex and ER 2019 
             3. Pt will demonstrate full cervical AROM void of pain. Improving - able to demo full rotation with discomfort at end range only 2019. Met no pain provocation with C/S ROM, though pain at rest Cervical AROM: rotation right 77, left 73, SB 30 bilat, ext 57, flex 62   2019 
             4. Pt will report 50% improvement in symptoms to improve QOL. Not met to date mostly unchanged 5/2/19 Key functional changes:  
 
Cervical AROM: rotation right 77, left 73, SB 30 bilat, ext 57, flex 62 
  
Right shoulder flexion MMT: 4/5 Right shoulder ER MMT: 4/5             
  
Large TrP noted in right UT and levator scapula, several small latent in rhomboids and mid trap 
  
Mild forward head and capitally extended posture with mild round shoulders, requires extensive tactile and verbal cueing for neutral posture Problems/ barriers to goal attainment: limited improvement in pain, continued myofascial pain and limited carryover with postural cues Problem List: pain affecting function, decrease ROM, decrease strength, decrease ADL/ functional abilitiies and decrease activity tolerance Treatment Plan: Therapeutic exercise, Therapeutic activities, Neuromuscular re-education, Physical agent/modality, Manual therapy, Patient education and Self Care training Patient Goal (s) has been updated and includes: Eliminate pain Goals for this certification period to be accomplished in 4 weeks: 
Progress towards goals / Updated goals: 
Short Term Goals: To be accomplished in 2 weeks: 
             1. Patient will report performance of HEP at least 2 times per day to facilitate improved outcomes and improved self management. Met 4/25/2019 
  
Long Term Goals: To be accomplished in 4 weeks: 
             1. Patient will report FOTO score of 74 or better to indicate significant improvement in functional status. Decline of 2 points, not consistent with reports of improvement 5/7/2019 
             2. Pt will demonstrate 4+/5 right shoulder ER and flexion to improve scapulohumeral mechanics. Not met 4/5 flex and ER 5/7/2019 
             3. Pt will report 50% improvement in symptoms to improve QOL. Not met to date mostly unchanged 5/2/19 Frequency / Duration: Patient to be seen 2 times per week for 4 weeks: Assessment / Recommendations: Pt reports some mild improvement in symptoms, with no pain provocation reported with C/S AROM which appears full, however, she reports some continued right shoulder/neck pain with associated myofascial involvement in her right upper trapezius and levator scapula with some mild right shoulder ER & flexion weakness. She also demonstrates forward head and shoulder postural tendencies with limited carryover noted despite extensive cueing. She will benefit from continued PT to address her impairments. Certification Period: 5/7/2019 - 6/6/2019 Zee Dears, PT, DPT, ATC 5/7/2019 3:26 PM 
 
________________________________________________________________________ I certify that the above Therapy Services are being furnished while the patient is under my care. I agree with the treatment plan and certify that this therapy is necessary. [] I have read the above and request that my patient continue as recommended. [] I have read the above report and request that my patient continue therapy with the following changes/special instructions: _____________________________________________ [] I have read the above report and request that my patient be discharged from therapy [de-identified] Signature:____________Date:_________TIME:________ 
 
[employer], Date and Time must be completed for valid certification ** Please sign and return to [address] [place], [address] 
[phone] fax

## 2019-05-09 ENCOUNTER — HOSPITAL ENCOUNTER (OUTPATIENT)
Dept: PHYSICAL THERAPY | Age: 69
Discharge: HOME | End: 2019-05-09
Payer: MEDICARE

## 2019-05-09 PROCEDURE — 97110 THERAPEUTIC EXERCISES: CPT

## 2019-05-09 PROCEDURE — 97140 MANUAL THERAPY 1/> REGIONS: CPT

## 2019-05-09 NOTE — PROGRESS NOTES
PT DAILY TREATMENT NOTE 10-18 Patient Name: Valeriano Brunner Date:2019 : 1950 [x]  Patient  Verified Payor: [place] MEDICARE / [facility] / Product Type: Medicare / In time:2:09  Out time:2:40 Total Treatment Time (min): 31 Visit #: 1 of 8 Medicare/BCBS Only Total Timed Codes (min):  31 1:1 Treatment Time:  23 Treatment Area: Cervicalgia [M54.2] Upper back pain [M54.9] SUBJECTIVE Pain Level (0-10 scale): 2 Any medication changes, allergies to medications, adverse drug reactions, diagnosis change, or new procedure performed?: [x] No    [] Yes (see summary sheet for update) Subjective functional status/changes:   [] No changes reported \"I was sore enough after the needling that I'm not gonna do it again\" OBJECTIVE Modality rationale: PD to improve the patients ability to Min Type Additional Details  
 [] Estim:  []Unatt       []IFC  []Premod []Other:  []w/ice   []w/heat Position: Location:  
 [] Estim: []Att    []TENS instruct  []NMES []Other:  []w/US   []w/ice   []w/heat Position: Location:  
 []  Traction: [] Cervical       []Lumbar 
                     [] Prone          []Supine []Intermittent   []Continuous Lbs: 
[] before manual 
[] after manual  
 []  Ultrasound: []Continuous   [] Pulsed []1MHz   []3MHz W/cm2: 
Location:  
 []  Iontophoresis with dexamethasone Location: [] Take home patch  
[] In clinic  
 []  Ice     []  heat 
[]  Ice massage 
[]  Laser  
[]  Anodyne Position: Location:  
 []  Laser with stim 
[]  Other:  Position: Location:  
 []  Vasopneumatic Device Pressure:       [] lo [] med [] hi  
Temperature: [] lo [] med [] hi  
[] Skin assessment post-treatment:  []intact []redness- no adverse reaction 
  []redness  adverse reaction:  
 
10 min Therapeutic Exercise:  [] See flow sheet :  
 Rationale: increase ROM and increase strength to improve the patients ability to perform daily tasks and self care 13 min Neuromuscular Re-education:  []  See flow sheet :  
Rationale: improve coordination and increase proprioception  to improve the patients ability to perform ADLs 8 min Manual Therapy:  SOR, segmental mobs focusing into rotation at upper cervical spine, t/s PA's and rib springs Rationale: decrease pain, increase ROM and increase tissue extensibility to improve ease of ADLs With 
 [] TE 
 [] TA 
 [] neuro 
 [] other: Patient Education: [x] Review HEP [] Progressed/Changed HEP based on:  
[] positioning   [] body mechanics   [] transfers   [] heat/ice application   
[] other:   
 
Other Objective/Functional Measures: pt demonstrates continued left sided cervical TTP with slight rotation at C3-4 Pain Level (0-10 scale) post treatment: 2 
 
ASSESSMENT/Changes in Function: Pt with no change in verbal pain scale. She tolerates session well at this time, will attempt to progress into different positions to challenge proprioceptive awareness and posture. Patient will continue to benefit from skilled PT services to modify and progress therapeutic interventions, address functional mobility deficits, address ROM deficits, address strength deficits, analyze and address soft tissue restrictions, analyze and cue movement patterns, analyze and modify body mechanics/ergonomics and assess and modify postural abnormalities to attain remaining goals. []  See Plan of Care 
[]  See progress note/recertification 
[]  See Discharge Summary Goals for this certification period to be accomplished in 4 weeks:      
Short Term Goals: To be accomplished in 2 weeks: 
             1. Patient will report performance of HEP at least 2 times per day to facilitate improved outcomes and improved self management. Met 4/25/2019 
  
Long Term Goals: To be accomplished in 4 weeks:              1. Patient will report FOTO score of 74 or better to indicate significant improvement in functional status. Decline of 2 points, not consistent with reports of improvement 5/7/2019 
             2. Pt will demonstrate 4+/5 right shoulder ER and flexion to improve scapulohumeral mechanics. Not met 4/5 flex and ER 5/7/2019 
             3. Pt will report 50% improvement in symptoms to improve QOL. Not met to date mostly unchanged 5/2/19 PLAN 
[]  Upgrade activities as tolerated     []  Continue plan of care 
[]  Update interventions per flow sheet      
[]  Discharge due to:_ 
[]  Other:_ Navneet Cosme DPT, CMTPT 5/9/2019  2:11 PM 
 
No future appointments.

## 2019-05-15 ENCOUNTER — HOSPITAL ENCOUNTER (OUTPATIENT)
Dept: PHYSICAL THERAPY | Age: 69
Discharge: HOME | End: 2019-05-15
Payer: MEDICARE

## 2019-05-15 NOTE — PROGRESS NOTES
PT DAILY TREATMENT NOTE 10-18 Patient Name: Neri Arteaga Date:5/15/2019 : 1950 [x]  Patient  Verified Payor: [place] MEDICARE / Plan: Ramón Schwartz / Product Type: Medicare / In time:4:04  Out time:4:45 Total Treatment Time (min): 41 Visit #: 2 of 8 Medicare/BCBS Only Total Timed Codes (min):  41 1:1 Treatment Time:  45 Treatment Area: Cervicalgia [M54.2] Upper back pain [M54.9] SUBJECTIVE Pain Level (0-10 scale): 0 Any medication changes, allergies to medications, adverse drug reactions, diagnosis change, or new procedure performed?: [x] No    [] Yes (see summary sheet for update) Subjective functional status/changes:   [] No changes reported \"No pain today finally\". OBJECTIVE 33 min Therapeutic Exercise:  [x] See flow sheet :  
Rationale: increase ROM and increase strength to improve the patients ability to perform ADLs. 8 min Manual Therapy:  C/S and T/S PA mobs, rib spings, SOR, STM right UT Rationale: increase ROM and increase tissue extensibility to improve patients ease of ADLs. With 
 [] TE 
 [] TA 
 [] neuro 
 [] other: Patient Education: [x] Review HEP [] Progressed/Changed HEP based on:  
[] positioning   [] body mechanics   [] transfers   [] heat/ice application   
[] other:   
 
Other Objective/Functional Measures: Patient reports 60% improvements since start of care. Pain Level (0-10 scale) post treatment: 0 
 
ASSESSMENT/Changes in Function: Patient remains pain free post session. Good tolerance to activity today, minimal cueing for prone exercises in order to maintain proper form and to facilitate muscle activation.   
 
Patient will continue to benefit from skilled PT services to modify and progress therapeutic interventions, address functional mobility deficits, address ROM deficits, address strength deficits, analyze and address soft tissue restrictions, analyze and cue movement patterns, analyze and modify body mechanics/ergonomics and assess and modify postural abnormalities to attain remaining goals. [x]  See Plan of Care 
[]  See progress note/recertification 
[]  See Discharge Summary Progress towards goals / Updated goals: 
Short Term Goals: To be accomplished in 2 weeks: 
             1. Patient will report performance of HEP at least 2 times per day to facilitate improved outcomes and improved self management. Met 4/25/2019 
  
Long Term Goals: To be accomplished in 4 weeks: 
             1. Patient will report FOTO score of 74 or better to indicate significant improvement in functional status. Decline of 2 points, not consistent with reports of improvement 5/7/2019 
             2. Pt will demonstrate 4+/5 right shoulder ER and flexion to improve scapulohumeral mechanics. Not met 4/5 flex and ER 5/7/2019 
             3. Pt will report 50% improvement in symptoms to improve QOL. Not met to date mostly unchanged 5/2/19. Goal met (5/15/19) per patient report - Patient reports 60% improvements since start of care. PLAN 
[]  Upgrade activities as tolerated     [x]  Continue plan of care 
[]  Update interventions per flow sheet      
[]  Discharge due to:_ 
[]  Other:_ Jaz Hannah, PT 5/15/2019  4:17 PM 
 
Future Appointments Date Time Provider Meghna Moreno 5/21/2019  2:00 PM Jesu Braun [facility]  
5/23/2019  2:00 PM Ольга Moe, PT [facility]  
5/28/2019  3:00 PM Megan Mart [facility]  
5/30/2019  2:00 PM Ольга Moe, PT [facility]  
6/4/2019  2:00 PM Jesu Braun [facility]  
6/6/2019  2:00 PM Megan Mart [facility]

## 2019-05-21 ENCOUNTER — HOSPITAL ENCOUNTER (OUTPATIENT)
Dept: PHYSICAL THERAPY | Age: 69
Discharge: HOME | End: 2019-05-21
Payer: MEDICARE

## 2019-05-21 PROCEDURE — 97140 MANUAL THERAPY 1/> REGIONS: CPT

## 2019-05-21 PROCEDURE — 97110 THERAPEUTIC EXERCISES: CPT

## 2019-05-21 NOTE — PROGRESS NOTES
PT DAILY TREATMENT NOTE - [facility]     Patient Name: Jose Carmona  Date:2019  : 1950  [x]  Patient  Verified  Payor: [place] MEDICARE / Plan: VA MEDICARE PART A & B / Product Type: Medicare /    In time:2:01pm  Out time:2:38pm  Total Treatment Time (min): 37  Total Timed Codes (min): 37  1:1 Treatment Time ( [address] only): 29   Visit #: 3 of 8    Treatment Area: Cervicalgia [M54.2]  Upper back pain [M54.9]    SUBJECTIVE  Pain Level (0-10 scale): 0-1  Any medication changes, allergies to medications, adverse drug reactions, diagnosis change, or new procedure performed?: [x] No    [] Yes (see summary sheet for update)  Subjective functional status/changes:   [] No changes reported  Pt reports only intermittent right sided neck pain. OBJECTIVE    29 min Therapeutic Exercise:  [x] See flow sheet :   Rationale: increase ROM and increase strength to improve the patients ability to improve ease of daily activity. 8 min Manual Therapy:  C/s PROM, segmental mobilizations, C/s and T/S PA mobs grade 2-3, rib spring mobs   Rationale: decrease pain, increase ROM and increase tissue extensibility to improve ease of daily activity. With   [] TE   [] TA   [] neuro   [] other: Patient Education: [x] Review HEP    [] Progressed/Changed HEP based on:   [] positioning   [] body mechanics   [] transfers   [] heat/ice application    [] other:      Other Objective/Functional Measures: good cervical PROM void of pain during PROM, fair segmental mobility void of noted focal deficits, (-) TTP, mild deficits in T/S segmental mobility void of pain     Pain Level (0-10 scale) post treatment: 0    ASSESSMENT/Changes in Function: Pt demonstrates good cervical mobility with fair segmental mobility today. No significant deficits noted. She does report some occasional neck discomfort, but none noted during treatment. She also demonstrates some continued slumped capitally extended postural tendencies.  Continue per POC.    Patient will continue to benefit from skilled PT services to modify and progress therapeutic interventions, address functional mobility deficits, address ROM deficits, address strength deficits, analyze and address soft tissue restrictions, analyze and cue movement patterns, assess and modify postural abnormalities and instruct in home and community integration to attain remaining goals. []  See Plan of Care  []  See progress note/recertification  []  See Discharge Summary         Progress towards goals / Updated goals:  Short Term Goals: To be accomplished in 2 weeks:               1. Patient will report performance of HEP at least 2 times per day to facilitate improved outcomes and improved self management. Met 4/25/2019     Long Term Goals: To be accomplished in 4 weeks:               1. Patient will report FOTO score of 74 or better to indicate significant improvement in functional status. Decline of 2 points, not consistent with reports of improvement 5/7/2019               2. Pt will demonstrate 4+/5 right shoulder ER and flexion to improve scapulohumeral mechanics. Not met 4/5 flex and ER 5/7/2019               3. Pt will report 50% improvement in symptoms to improve QOL. Not met to date mostly unchanged 5/2/19. Goal met (5/15/19) per patient report - Patient reports 60% improvements since start of care.        PLAN  []  Upgrade activities as tolerated     [x]  Continue plan of care  []  Update interventions per flow sheet       []  Discharge due to:_  []  Other:_      Denisha Ponce, PT, DPT, ATC 5/21/2019  2:18 PM    Future Appointments   Date Time Provider Meghna Tiffanie   5/28/2019  3:00 PM Mustapha, [address] [facility]   5/30/2019  2:00 [facility]   6/4/2019  2:00 [facility]   6/6/2019  2:00 PM [facility]   6/18/2019  2:00 PM [facility]

## 2019-05-23 ENCOUNTER — APPOINTMENT (OUTPATIENT)
Dept: PHYSICAL THERAPY | Age: 69
End: 2019-05-23
Payer: MEDICARE

## 2019-05-28 ENCOUNTER — HOSPITAL ENCOUNTER (OUTPATIENT)
Dept: PHYSICAL THERAPY | Age: 69
Discharge: HOME | End: 2019-05-28
Payer: MEDICARE

## 2019-05-28 PROCEDURE — 97140 MANUAL THERAPY 1/> REGIONS: CPT

## 2019-05-28 PROCEDURE — 97110 THERAPEUTIC EXERCISES: CPT

## 2019-05-28 PROCEDURE — 97112 NEUROMUSCULAR REEDUCATION: CPT

## 2019-05-28 NOTE — PROGRESS NOTES
PT DAILY TREATMENT NOTE 10-18    Patient Name: Rexann Lennox  Date:2019  : 1950  [x]  Patient  Verified  Payor: [place] MEDICARE / Plan: [place] MEDICARE PART A & B / Product Type: Medicare /    In time:3:04  Out time:3:42  Total Treatment Time (min): 38  Visit #: 4 of 8    Medicare/BCBS Only   Total Timed Codes (min):  38 1:1 Treatment Time:  38       Treatment Area: Cervicalgia [M54.2]  Upper back pain [M54.9]    SUBJECTIVE  Pain Level (0-10 scale): 3  Any medication changes, allergies to medications, adverse drug reactions, diagnosis change, or new procedure performed?: [x] No    [] Yes (see summary sheet for update)  Subjective functional status/changes:   [] No changes reported  Pt reports that she stopped doing push ups and feels that may have helped with her decrease in pain.  She reports tossing a small child up and down this weekend and feels that may be the source of her discomfort over the last couple of days    OBJECTIVE    Modality rationale: PD to improve the patients ability to    Min Type Additional Details    [] Estim:  []Unatt       []IFC  []Premod                        []Other:  []w/ice   []w/heat  Position:  Location:    [] Estim: []Att    []TENS instruct  []NMES                    []Other:  []w/US   []w/ice   []w/heat  Position:  Location:    []  Traction: [] Cervical       []Lumbar                       [] Prone          []Supine                       []Intermittent   []Continuous Lbs:  [] before manual  [] after manual    []  Ultrasound: []Continuous   [] Pulsed                           []1MHz   []3MHz W/cm2:  Location:    []  Iontophoresis with dexamethasone         Location: [] Take home patch   [] In clinic    []  Ice     []  heat  []  Ice massage  []  Laser   []  Anodyne Position:  Location:    []  Laser with stim  []  Other:  Position:  Location:    []  Vasopneumatic Device Pressure:       [] lo [] med [] hi   Temperature: [] lo [] med [] hi   [] Skin assessment post-treatment: []intact []redness- no adverse reaction    []redness  adverse reaction:       20 min Therapeutic Exercise:  [] See flow sheet :   Rationale: increase ROM and increase strength to improve the patients ability to perform daily tasks and workouts     10 min Neuromuscular Re-education:  []  See flow sheet :   Rationale: improve coordination and increase proprioception  to improve the patients ability to perform ADLs with improved mechanics    8 min Manual Therapy:  T/s PA's and rib springs, thoracic traction, TPR left levator scap, left medial scap border mobs   Rationale: decrease pain, increase ROM and increase tissue extensibility to improve ease of ADL performance        With   [] TE   [] TA   [] neuro   [] other: Patient Education: [x] Review HEP    [] Progressed/Changed HEP based on:   [] positioning   [] body mechanics   [] transfers   [] heat/ice application    [] other:      Other Objective/Functional Measures: intermittent cuing for cervical elongation and chin tuck in QP     Pain Level (0-10 scale) post treatment: 2    ASSESSMENT/Changes in Function: Pt with discomfort more localized to left levator scap today, no real cervical pain reported at this time. Assess symptoms at next visit, progress with scapular activation and strength to reduce strain on cervical musculature    Patient will continue to benefit from skilled PT services to modify and progress therapeutic interventions, address functional mobility deficits, address ROM deficits, address strength deficits, analyze and address soft tissue restrictions, analyze and cue movement patterns, analyze and modify body mechanics/ergonomics and assess and modify postural abnormalities to attain remaining goals. []  See Plan of Care  []  See progress note/recertification  []  See Discharge Summary         Progress towards goals / Updated goals:  Short Term Goals: To be accomplished in 2 weeks:               1.  Patient will report performance of HEP at least 2 times per day to facilitate improved outcomes and improved self management. Met 4/25/2019     Long Term Goals: To be accomplished in 4 weeks:               1. Patient will report FOTO score of 74 or better to indicate significant improvement in functional status. Decline of 2 points, not consistent with reports of improvement 5/7/2019               2. Pt will demonstrate 4+/5 right shoulder ER and flexion to improve scapulohumeral mechanics. Not met 4/5 flex and ER 5/7/2019 Met- 4+/5 each without pain 5/28/19               3. Pt will report 50% improvement in symptoms to improve QOL. Not met to date mostly unchanged 5/2/19.  Goal met (5/15/19) per patient report - Patient reports 60% improvements since start of care.       PLAN  []  Upgrade activities as tolerated     [x]  Continue plan of care  []  Update interventions per flow sheet       []  Discharge due to:_  []  Other:_      Natalia Davis DPT, CMTPT 5/28/2019  3:06 PM    Future Appointments   Date Time Provider Meghna Moreno   5/30/2019  2:00 PM [facility]   [facility]2019  [facility]00 [facility]   6/6/2019  2:00 PM Mosetta [place] [facility]   6/18/2019  2:00 PM [facility]

## 2019-05-30 ENCOUNTER — HOSPITAL ENCOUNTER (OUTPATIENT)
Dept: PHYSICAL THERAPY | Age: 69
Discharge: HOME | End: 2019-05-30
Payer: MEDICARE

## 2019-05-30 PROCEDURE — 97112 NEUROMUSCULAR REEDUCATION: CPT

## 2019-05-30 PROCEDURE — 97140 MANUAL THERAPY 1/> REGIONS: CPT

## 2019-05-30 PROCEDURE — 97110 THERAPEUTIC EXERCISES: CPT

## 2019-05-30 NOTE — PROGRESS NOTES
PT DAILY TREATMENT NOTE 10-18    Patient Name: Macho Jolley  Date:2019  : 1950  [x]  Patient  Verified  Payor: [place] MEDICARE / Plan: [place] MEDICARE PART A & B / Product Type: Medicare /    In time:2:06  Out time:2:47  Total Treatment Time (min): 39  Visit #: 5 of 8    Medicare/BCBS Only   Total Timed Codes (min):  39 1:1 Treatment Time:  39       Treatment Area: Cervicalgia [M54.2]  Upper back pain [M54.9]    SUBJECTIVE  Pain Level (0-10 scale): 10  Any medication changes, allergies to medications, adverse drug reactions, diagnosis change, or new procedure performed?: [x] No    [] Yes (see summary sheet for update)   Subjective functional status/changes:   [] No changes reported  Patient stated that the pain across her neck/upper back is no longer constant, but is intermittent. OBJECTIVE    23 min Therapeutic Exercise:  [x] See flow sheet :   Rationale: increase ROM and increase strength to improve the patients ability to perform ADls. 8 min Neuromuscular Re-education:  [x]  See flow sheet : scap stabilization    Rationale: increase strength, improve coordination and increase proprioception  to improve the patients ability to improve mechanics and increase ease with recreational activities. 8 min Manual Therapy:  STM along thoracic paraspinals/UT/ and Levator scap    Rationale: decrease pain, increase ROM and increase tissue extensibility to increase ease with ADLs. With   [] TE   [] TA   [] neuro   [] other: Patient Education: [x] Review HEP    [] Progressed/Changed HEP based on:   [] positioning   [] body mechanics   [] transfers   [] heat/ice application    [] other:      Other Objective/Functional Measures:   Patient was able to perform chin tucks with deflated ball against wall, but had difficulty controlling the ball with rotations, so had patient perform in supine.       Pain Level (0-10 scale) post treatment: 1/10      ASSESSMENT/Changes in Function: Patient reported no pain with new exercises per flow sheet. Minimal scapular retraction noted with Q.P. SA punch. Patient had a tendency to compensate with increased lumbar extension. Therapist cued patient to maintain lumbar/pelvis in neutral. Patient reported discomfort at left mid deltoid with Alt UE flex while in Q.P. Therapist had patient change arm position so thumb was up and stated it felt a little better in that position. Patient reported reduced pain at end of the session. Patient will continue to benefit from skilled PT services to modify and progress therapeutic interventions, address functional mobility deficits, address ROM deficits, address strength deficits, analyze and address soft tissue restrictions, analyze and cue movement patterns and assess and modify postural abnormalities to attain remaining goals. []  See Plan of Care  []  See progress note/recertification  []  See Discharge Summary         Progress towards goals / Updated goals:    Short Term Goals: To be accomplished in 2 weeks:               1. Patient will report performance of HEP at least 2 times per day to facilitate improved outcomes and improved self management. Met 4/25/2019     Long Term Goals: To be accomplished in 4 weeks:               1. Patient will report FOTO score of 74 or better to indicate significant improvement in functional status. Decline of 2 points, not consistent with reports of improvement 5/7/2019               2. Pt will demonstrate 4+/5 right shoulder ER and flexion to improve scapulohumeral mechanics. Not met 4/5 flex and ER 5/7/2019 Met- 4+/5 each without pain 5/28/19               3. Pt will report 50% improvement in symptoms to improve QOL. Not met to date mostly unchanged 5/2/19.  Goal met (5/15/19) per patient report - Patient reports 60% improvements since start of care.        PLAN  []  Upgrade activities as tolerated     [x]  Continue plan of care  []  Update interventions per flow sheet       []  Discharge due to:_  []  Other:_      Gaby Bella, PT 5/30/2019  2:12 PM    Future Appointments   Date Time Provider Meghna Moreno   6/4/2019  [facility]   6/6/2019  2:00 PM Ana Rao [facility]   6/18/2019  2:00 [facility]

## 2019-06-04 ENCOUNTER — HOSPITAL ENCOUNTER (OUTPATIENT)
Dept: PHYSICAL THERAPY | Age: 69
Discharge: HOME | End: 2019-06-04
Payer: MEDICARE

## 2019-06-04 PROCEDURE — 97112 NEUROMUSCULAR REEDUCATION: CPT

## 2019-06-04 PROCEDURE — 97110 THERAPEUTIC EXERCISES: CPT

## 2019-06-04 NOTE — PROGRESS NOTES
PT DAILY TREATMENT NOTE - [facility]     Patient Name: Melissa Fletcher  Date:2019  : 1950  [x]  Patient  Verified  Payor: VA MEDICARE / Plan: VA MEDICARE PART A & B / Product Type: Medicare /    In time:2:09pm  Out time:2:39pm  Total Treatment Time (min): 30  Total Timed Codes (min): 30  1:1 Treatment Time ( only): 30   Visit #: 6 of 8    Treatment Area: Cervicalgia [M54.2]  Upper back pain [M54.9]    SUBJECTIVE  Pain Level (0-10 scale): 0  Any medication changes, allergies to medications, adverse drug reactions, diagnosis change, or new procedure performed?: [x] No    [] Yes (see summary sheet for update)  Subjective functional status/changes:   [] No changes reported  Pt reports no pain upon arrival.    OBJECTIVE  19 min Therapeutic Exercise:  [x] See flow sheet :   Rationale: increase ROM and increase strength to improve the patients ability to perform daily tasks and workouts    8 min Manual Therapy:  T/s PA's and rib springs, TPR left levator scap, left medial scap border mobs   Rationale: decrease pain, increase ROM and increase tissue extensibility to improve ease of daily activity. 10 min Neuromuscular Re-education:  [x]  See flow sheet :   Rationale: improve coordination and increase proprioception  to improve the patients ability to improve mechanics with daily tasks. With   [] TE   [] TA   [] neuro   [] other: Patient Education: [x] Review HEP    [] Progressed/Changed HEP based on:   [] positioning   [] body mechanics   [] transfers   [] heat/ice application    [] other:      Other Objective/Functional Measures: latent TrP in left levator scapula     Requires frequent cueing for scapular retraction    Continue to demonstrate mild kyphotic forward head and shoulder posture     Cues for scapular mobility during QP interventions    Pain Level (0-10 scale) post treatment: 0    ASSESSMENT/Changes in Function: Pt reports no pain today.  She does continue to demonstrate limited scapular mobility during interventions despite extensive cueing and remains mildly kyphotic with limited T/S mobility. If symptoms remain low, will likely plan to DC [place]. Patient will continue to benefit from skilled PT services to modify and progress therapeutic interventions, address ROM deficits, address strength deficits, analyze and address soft tissue restrictions, analyze and cue movement patterns, assess and modify postural abnormalities and instruct in home and community integration to attain remaining goals. []  See Plan of Care  []  See progress note/recertification  []  See Discharge Summary         Progress towards goals / Updated goals:  Short Term Goals: To be accomplished in 2 weeks:               1. Patient will report performance of HEP at least 2 times per day to facilitate improved outcomes and improved self management. Met 4/25/2019     Long Term Goals: To be accomplished in 4 weeks:               1. Patient will report FOTO score of 74 or better to indicate significant improvement in functional status. Decline of 2 points, not consistent with reports of improvement 5/7/2019               2. Pt will demonstrate 4+/5 right shoulder ER and flexion to improve scapulohumeral mechanics. Not met 4/5 flex and ER 5/7/2019 Met- 4+/5 each without pain 5/28/19               3. Pt will report 50% improvement in symptoms to improve QOL. Not met to date mostly unchanged 5/2/19.  Goal met (5/15/19) per patient report - Patient reports 60% improvements since start of care. Pt reports no symptoms today 6/4/2019    PLAN  []  Upgrade activities as tolerated     [x]  Continue plan of care  []  Update interventions per flow sheet       []  Discharge due to:_  []  Other:_      Sirisha Pugh, PT, DPT, ATC 6/4/2019  2:11 PM    Future Appointments   Date Time Provider Meghna Moreno   6/6/2019  2:00 PM Mustapha Taylor[address] [facility]   6/18/2019  2:00 PM Nikos Seals [facility]

## 2019-06-06 ENCOUNTER — HOSPITAL ENCOUNTER (OUTPATIENT)
Dept: PHYSICAL THERAPY | Age: 69
Discharge: HOME | End: 2019-06-06
Payer: MEDICARE

## 2019-06-06 PROCEDURE — 97140 MANUAL THERAPY 1/> REGIONS: CPT

## 2019-06-06 PROCEDURE — 97112 NEUROMUSCULAR REEDUCATION: CPT

## 2019-06-06 NOTE — PROGRESS NOTES
PT DISCHARGE DAILY NOTE AND [id number]    Date:2019  Patient name: Ruthy Beard Start of Care: 2019   Referral source: Gaby Licea MD : 1950   Medical/Treatment Diagnosis: Cervicalgia [M54.2]  Upper back pain [M54.9]  Payor: [place] MEDICARE / Plan: VA MEDICARE PART A & B / Product Type: Medicare /  Onset Date:3-4 weeks ago      Prior Hospitalization: see medical history Provider#: [id number]   Medications: Verified on Patient Summary List     Comorbidities: colonoscopy, GERD, hiatal hernia, otherwise unremarkable   Prior Level of Function: gym 4 days/week, exercise with light weight void of limitations, deskwork void of pain  Visits from Start of Care: 14    Missed Visits: 0    Reporting Period : 2019 to 2019     [x]  Patient  Verified  Payor: VA MEDICARE / Plan: VA MEDICARE PART A & B / Product Type: Medicare /    In time:2:02  Out time:2:44  Total Treatment Time (min): 42  Total Timed Codes (min): 42  1:1 Treatment Time ([facility] only): 31   Visit #: 7 of 8    SUBJECTIVE  Pain Level (0-10 scale): 0  Any medication changes, allergies to medications, adverse drug reactions, diagnosis change, or new procedure performed?: [x] No    [] Yes (see summary sheet for update)  Subjective functional status/changes:   [] No changes reported  \"Its doing better\"    OBJECTIVE    Modality rationale: PD to improve the patients ability to    Min Type Additional Details    [] Estim:  []Unatt       []IFC  []Premod                        []Other:  []w/ice   []w/heat  Position:  Location:    [] Estim: []Att    []TENS instruct  []NMES                    []Other:  []w/US   []w/ice   []w/heat  Position:  Location:    []  Traction: [] Cervical       []Lumbar                       [] Prone          []Supine                       []Intermittent   []Continuous Lbs:  [] before manual  [] after manual    []  Ultrasound: []Continuous   [] Pulsed                           []1MHz   []3MHz W/cm2:  Location:    []  Iontophoresis with dexamethasone         Location: [] Take home patch   [] In clinic    []  Ice     []  heat  []  Ice massage  []  Laser   []  Anodyne Position:  Location:    []  Laser with stim  []  Other:  Position:  Location:    []  Vasopneumatic Device Pressure:       [] lo [] med [] hi   Temperature: [] lo [] med [] hi   [] Skin assessment post-treatment:  []intact []redness- no adverse reaction    []redness  adverse reaction:       26 min Therapeutic Exercise:  [] See flow sheet :   Rationale: increase ROM and increase strength to improve the patients ability to perform ADLs     8 min Neuromuscular Re-education:  []  See flow sheet :   Rationale: improve coordination and increase proprioception  to improve the patients scapular mobility and control to reduce strain through C-T junction    8 min Manual Therapy:  T/s PA's and rib springs, thoracic traction, TPR left levator scap   Rationale: increase ROM and increase tissue extensibility to improve ease of ADLs and workouts       With   [] TE   [] TA   [] neuro   [] other: Patient Education: [x] Review HEP    [] Progressed/Changed HEP based on:   [] positioning   [] body mechanics   [] transfers   [] heat/ice application    [] other:      Other Objective/Functional Measures: FOTO increased to 70%     Pain Level (0-10 scale) post treatment: 0    Summary of Care:  Short Term Goals: To be accomplished in 2 weeks:               1. Patient will report performance of HEP at least 2 times per day to facilitate improved outcomes and improved self management. Met 4/25/2019     Long Term Goals: To be accomplished in 4 weeks:               1.  Patient will report FOTO score of 74 or better to indicate significant improvement in functional status. Decline of 2 points, not consistent with reports of improvement 5/7/2019; near met 70% 6/6/19               2. Pt will demonstrate 4+/5 right shoulder ER and flexion to improve scapulohumeral mechanics. Not met 4/5 flex and ER 5/7/2019 Met- 4+/5 each without pain 5/28/19               3. Pt will report 50% improvement in symptoms to improve QOL. Not met to date mostly unchanged 5/2/19. Goal met (5/15/19) per patient report - Patient reports 60% improvements since start of care. Pt reports no symptoms today 6/4/2019    ASSESSMENT/Changes in Function: Pt has progressed well overall in regards to pain levels and activity tolerance. She demonstrates improved shoulder strength at this time and improved symptomatology. She does still report intermittent discomfort through levator scap region.  Pt is comfortable D/C to HEP with scapular and thoracic mobility and strength work    Thank you for this referral!     PLAN  []Discontinue therapy: []Patient has reached or is progressing toward set goals      []Patient is non-compliant or has abdicated      []Due to lack of appreciable progress towards set [address] Ami Goodrich 6/6/2019  2:02 PM

## 2020-05-15 ENCOUNTER — DOCUMENTATION ONLY (OUTPATIENT)
Dept: ORTHOPEDIC SURGERY | Age: 70
End: 2020-05-15

## 2020-05-15 ENCOUNTER — TELEPHONE (OUTPATIENT)
Dept: ORTHOPEDIC SURGERY | Age: 70
End: 2020-05-15

## 2020-05-18 ENCOUNTER — OFFICE VISIT (OUTPATIENT)
Dept: ORTHOPEDIC SURGERY | Age: 70
End: 2020-05-18

## 2020-05-18 VITALS
HEART RATE: 80 BPM | DIASTOLIC BLOOD PRESSURE: 95 MMHG | SYSTOLIC BLOOD PRESSURE: 140 MMHG | TEMPERATURE: 97 F | RESPIRATION RATE: 18 BRPM | BODY MASS INDEX: 25.09 KG/M2 | HEIGHT: 63 IN | OXYGEN SATURATION: 97 % | WEIGHT: 141.6 LBS

## 2020-05-18 DIAGNOSIS — M25.572 ACUTE LEFT ANKLE PAIN: ICD-10-CM

## 2020-05-18 DIAGNOSIS — M76.62 LEFT ACHILLES TENDINITIS: Primary | ICD-10-CM

## 2020-05-18 NOTE — PROGRESS NOTES
1. Have you been to the ER, urgent care clinic since your last visit? Hospitalized since your last visit? No    2. Have you seen or consulted any other health care providers outside of the [address] [place] [address] since your last visit? Include any pap smears or colon screening.  No

## 2020-05-18 NOTE — PATIENT INSTRUCTIONS
Follow up in office in 3 weeks Achilles Tendon: Exercises Introduction Here are some examples of exercises for you to try. The exercises may be suggested for a condition or for rehabilitation. Start each exercise slowly. Ease off the exercises if you start to have pain. You will be told when to start these exercises and which ones will work best for you. Toe stretch Toe stretch 1. Sit in a chair, and extend your affected leg so that your heel is on the floor. 2. With your hand, reach down and pull your big toe up and back. Pull toward your ankle and away from the floor. 3. Hold the position for at least 15 to 30 seconds. 4. Repeat 2 to 4 times a session, several times a day. Calf-plantar fascia stretch 1. Sit with your legs extended and knees straight. 2. Place a towel around your foot just under the toes. 3. Hold each end of the towel in each hand, with your hands above your knees. 4. Pull back with the towel so that your foot stretches toward you. 5. Hold the position for at least 15 to 30 seconds. 6. Repeat 2 to 4 times a session, up to 5 sessions a day. Floor stretch 1. Stand about 2 feet from a wall, and place your hands on the wall at about shoulder height. Or you can stand behind a chair, placing your hands on the back of it for balance. 2. Step back with the leg you want to stretch. Keep the leg straight, and press your heel into the floor with your toe turned slightly in. 
3. Lean forward, and bend your other leg slightly. Feel the stretch in the Achilles tendon of your back leg. Hold for at least 15 to 30 seconds. 4. Repeat 2 to 4 times a session, up to 5 sessions a day. Stair stretch 1. Stand with the balls of both feet on the edge of a step or curb (or a medium-sized phone book). With at least one hand, hold onto something solid for balance, such as a banister or handrail.  
2. Keeping your affected leg straight, slowly let that heel hang down off of the step or curb until you feel a stretch in the back of your calf and/or Achilles area. Some of your weight should still be on the other leg. 3. Hold this position for at least 15 to 30 seconds. 4. Repeat 2 to 4 times a session, up to 5 times a day or whenever your Achilles tendon starts to feel tight. This stretch can also be done with your knee slightly bent. Strength exercise 1. This exercise will get you started on building strength after an Achilles tendon injury. Your doctor or physical therapist can help you move on to more challenging exercises as you heal and get stronger. 2. Stand on a step with your heel off the edge of the step. Hold on to a handrail or wall for balance. 3. Push up on your toes, then slowly count to 10 as you lower yourself back down until your heel is below the step. If it hurts to push up on your toes, try putting most of your weight on your other foot as you push up, or try using your arms to help you. If you can't do this exercise without causing pain, stop the exercise and talk to your doctor. 4. Repeat the exercise 8 to 12 times, half with the knee straight and half with the knee bent. Follow-up care is a key part of your treatment and safety. Be sure to make and go to all appointments, and call your doctor if you are having problems. It's also a good idea to know your test results and keep a list of the medicines you take. Where can you learn more? Go to http://kayla-narayan.info/ Enter Z437 in the search box to learn more about \"Achilles Tendon: Exercises. \" Current as of: June 26, 2019Content Version: 12.4 © 2555-7294 [employer]. Care instructions adapted under license by [employer] (which disclaims liability or warranty for this information).  If you have questions about a medical condition or this instruction, always ask your healthcare professional. Theo Higginbotham disclaims any warranty or liability for your use of this information.

## 2020-06-09 ENCOUNTER — OFFICE VISIT (OUTPATIENT)
Dept: ORTHOPEDIC SURGERY | Age: 70
End: 2020-06-09

## 2020-06-09 VITALS — WEIGHT: 141 LBS | BODY MASS INDEX: 24.98 KG/M2 | TEMPERATURE: 97.7 F | HEIGHT: 63 IN

## 2020-06-09 DIAGNOSIS — M76.62 LEFT ACHILLES TENDINITIS: Primary | ICD-10-CM

## 2020-06-09 DIAGNOSIS — M25.572 ACUTE LEFT ANKLE PAIN: ICD-10-CM

## 2020-06-09 NOTE — PROGRESS NOTES
1. Have you been to the ER, urgent care clinic since your last visit? Hospitalized since your last visit? No    2. Have you seen or consulted any other health care providers outside of the [address] [place] [address] since your last visit? Include any pap smears or colon screening.  No

## 2020-06-09 NOTE — PROGRESS NOTES
AMBULATORY PROGRESS NOTE      Patient: Laura Mendoza             MRN: [id number]     SSN: [id number] Body mass index is 24.98 kg/m². YOB: 1950     AGE: 72 y.o. EX: female    PCP: Robinson Palm MD       IMPRESSION //  DIAGNOSIS AND TREATMENT PLAN      DIAGNOSES  1. Left Achilles tendinitis    2. Acute left ankle pain        Orders Placed This Encounter    MRI ANKLE LT WO CONT     Standing Status:   Future     Standing Expiration Date:   7/9/2021     Order Specific Question:   Arthrogram study     Answer:   No     Order Specific Question:   Reason for Exam     Answer:   evaluate achilles tendon        This individual, is to have pain discomfort to her left Achilles tendon despite cam walker boot, active modification anti-inflammatories recommendations MRI to rule out interstitial tear of the Achilles tendon. HPI //  Ul. Alon Rebolledo IS A 72 y.o. female who presents to my outpatient office for evaluation of:  Shakila 41      She has been treated for Achilles tendinitis, conservatively, for 6 to 8 weeks, is to have disabling pain of Achilles tendon left side. She started pain left side Achilles tendon, and tenderness at the insertion point. On examination 6/9/2020 , the patient is alert, oriented (name, place, time) and follows commands. she is in no acute distress and her affect and mood are appropriate.         Visit Vitals  Temp 97.7 °F (36.5 °C) (Oral)   Ht 5' 3\" (1.6 m)   Wt 141 lb (64 kg)   BMI 24.98 kg/m²           ANKLE/FOOT left     Psychiatry: Alert, Oriented x 3; Speech normal in context and clarity,                       Memory intact grossly, no involuntary movements - tremors, no dementia  Gait: slow  Tenderness: moderate tender achilles tendon without deformity palpable:      Central distal achilles spur   Swelling: moderate  Calf tenderness: Absent for calf or gastrocnemius muscle regions              Soft, supple, non tender, non taut lower extremity compartments              NONE to Medial Malleolar, 4/5 Met base midfoot, achilles, tib post, or              NONE to syndesmosis. no to plantar fascia, or central calcaneal region  Cutaneous: WNL, Joint Motion: WNL   Joint / Tendon Stability: No Ankle or Subtalar instability or joint laxity. No peroneal sublux ability or dislocation  Alignment: neutral Hindfoot, none Metatarsus Adductus Metatarsus. Neuro Motor/Sensory: NL/NL, Vascular: NL foot/ankle pulses  Lymphatics: No extremity lymphedema, No calf swelling, no tenderness to calf muscles. CHART REVIEW     Patient Active Problem List   Diagnosis Code    GERD (gastroesophageal reflux disease) K21.9    Gastric erosions K25.9    Incomplete bladder emptying R33.9        Dank Roe has been experiencing pain and discomfort confirmed as outlined in the pain assessment outlined below. Pain Assessment  6/9/2020   Location of Pain Ankle   Pain Location Comment -   Location Modifiers Left   Severity of Pain 7   Quality of Pain Aching   Duration of Pain A few hours   Frequency of Pain Intermittent   Aggravating Factors Walking   Aggravating Factors Comment -   Limiting Behavior -   Relieving Factors Nothing   Result of Injury No        Dank Roe  has a past medical history of Anal fissure, GERD (gastroesophageal reflux disease), and H/O: hysterectomy (02/12/1998). She also has no past medical history of Difficult intubation, Malignant hyperthermia due to anesthesia, Nausea & vomiting, or Pseudocholinesterase deficiency. Patients is employed at:         Past Medical History:   Diagnosis Date    Anal fissure     GERD (gastroesophageal reflux disease)     H/O: hysterectomy 02/12/1998     Current Outpatient Medications   Medication Sig    sucralfate (CARAFATE) 1 gram tablet Take 1 g by mouth four (4) times daily.     diclofenac (VOLTAREN) 1 % gel Apply 4 g to affected area two (2) times a day. Apply to affected lateral ankle twice daily    alum-mag hydroxide-simeth (MYLANTA) 200-200-20 mg/5 mL susp Take 30 mL by mouth every four (4) hours as needed.  calcium carbonate (TUMS) 200 mg calcium (500 mg) chew Take 1 Tab by mouth daily.  loratadine (CLARITIN) 10 mg tablet Take 10 mg by mouth daily.  ranitidine (ZANTAC) 150 mg tablet Take 150 mg by mouth two (2) times a day. No current facility-administered medications for this visit. THE  FOR Minna Herr MD 6/9/2020 . Allergies   Allergen Reactions    Penicillin G Rash     Past Surgical History:   Procedure Laterality Date    BREAST SURGERY PROCEDURE UNLISTED      reduction    HX ENDOSCOPY      HX GI      colonoscopy, EGD    HX GI      ANAL FISSURE    HX GYN      hysterectomy    HX HYSTERECTOMY  02/12/1998     Social History     Occupational History    Not on file   Tobacco Use    Smoking status: Never Smoker    Smokeless tobacco: Never Used   Substance and Sexual Activity    Alcohol use: No     Alcohol/week: 0.0 standard drinks    Drug use: No    Sexual activity: Not on file     Family History   Problem Relation Age of Onset    Diabetes Father     Diabetes Sister     Hypertension Mother     Stroke Mother     Hypertension Sister         DIAGNOSTIC IMAGING  LAB DATA      No results found for: HBA1C, HGBE8, JUD2PUMS //   Lab Results   Component Value Date/Time    Glucose 85 02/08/2019 12:49 PM        No results found for: SVJ7NANS, PSO7MUDU      No results found for: VITD3, XQVID2, XQVID3, XQVID, VD3RIA, [id number]      REVIEW OF SYSTEMS : 6/9/2020  ALL BELOW ARE Negative except : SEE HPI     Constitutional: Negative for fever, chills and weight loss. Neg Weight Loss  Cardiovascular: Negative for chest pain, claudication and leg swelling.  SOB, CALDERA   Gastrointestinal/Urological: Negative for pain, N/V/D/C, Blood in stool or urine,dysuria, Hematuria, Incontinence  Musculoskeletal: see HPI. Neurological: Negative for dizziness and weakness, headaches,Visual Changes or             Confusion, or Seizures,   Psychiatric/Behavioral: Negative for depression, memory loss and substance abuse. Extremities:  Negative for hair changes, rash or skin lesion changes. Hematologic: Negative for Bleeding problems, bruising, pallor or swollen lymph nodes. Peripheral Vascular: No calf pain, vascular vein tenderness to calf pain// No calf throbbing, posterior knee throbbing pain     DIAGNOSTIC IMAGING      Please see above section of this report. I have personally reviewed the results of the above study. The interpretation of this study is my professional opinion.       Phil Lockwood MD  6/9/2020  5:12 PM

## 2020-06-20 ENCOUNTER — HOSPITAL ENCOUNTER (OUTPATIENT)
Age: 70
Discharge: HOME | End: 2020-06-20
Attending: ORTHOPAEDIC SURGERY
Payer: MEDICARE

## 2020-06-20 DIAGNOSIS — M76.62 LEFT ACHILLES TENDINITIS: ICD-10-CM

## 2020-06-20 DIAGNOSIS — M25.572 ACUTE LEFT ANKLE PAIN: ICD-10-CM

## 2020-06-20 PROCEDURE — 73721 MRI JNT OF LWR EXTRE W/O DYE: CPT

## 2020-06-23 ENCOUNTER — OFFICE VISIT (OUTPATIENT)
Dept: ORTHOPEDIC SURGERY | Age: 70
End: 2020-06-23

## 2020-06-23 VITALS
SYSTOLIC BLOOD PRESSURE: 129 MMHG | BODY MASS INDEX: 24.45 KG/M2 | HEIGHT: 63 IN | WEIGHT: 138 LBS | HEART RATE: 75 BPM | OXYGEN SATURATION: 97 % | DIASTOLIC BLOOD PRESSURE: 94 MMHG | TEMPERATURE: 96 F

## 2020-06-23 DIAGNOSIS — M76.62 LEFT ACHILLES TENDINITIS: Primary | ICD-10-CM

## 2020-06-23 NOTE — PROGRESS NOTES
DIAGNOSTIC IMAGING         MRI Results (most recent):  Results from Hospital Encounter encounter on 06/20/20   MRI ANKLE LT WO CONT    Narrative EXAM: MRI ANKLE LT WO CONT    CLINICAL INDICATION/HISTORY: 72 years Female. evaluate achilles tendon. Additional History: Chronic posterior ankle pain not responsive to nonsurgical  management. Clinical concern for interstitial tear of the Achilles tendon. COMPARISON: None    TECHNIQUE: Multiplanar, multisequence MR imaging of the left ankle without   contrast.    FINDINGS:    OSSEOUS STRUCTURES/JOINTS:   There is mild subchondral edema at the navicular bone at the articulation with  the medial and middle cuneiforms (sagittal, 9; axial, 18). The ankle mortise is preserved and the talar dome is smooth. There is a 0.7 x 0.4 x 0.7 cm subcortical cyst at the medial left fibula at the  level of the lateral mortise (coronal, 19). TENDONS  Flexor: Unremarkable  Peroneals: Unremarkable   Extensor: Unremarkable   Achilles: There is mild edema at the posterior calcaneus and the attachment of  the Achilles tendon (sagittal, 13). A subtle linear intermediate signal band  within the Achilles tendon at this site could represent a small interstitial  tear but normal fascicles probably favored. There is no overlying soft tissue  swelling, fluid or edema. LIGAMENTS  Lateral: Intact  Medial: Intact  Lisfranc: Intact    PLANTAR FASCIA: Unremarkable    MUSCULATURE: Unremarkable    SINUS TARSI: Clear    SOFT TISSUES/OTHER: Unremarkable      Impression IMPRESSION:    No clear etiology for the patient's clinical symptoms. There is mild marrow  edema at the attachment of the Achilles tendon but no significant tendon  thickening, peritendinous fluid or edema or retrocalcaneal bursal collection. Degenerative subcortical cyst formation is seen at the medial fibula at the  lateral ankle joint and the articulation with the navicular bone and cuneiforms.

## 2020-06-23 NOTE — PROGRESS NOTES
AMBULATORY PROGRESS NOTE      Patient: Zaire Gerber             MRN: [id number]     SSN: [id number] Body mass index is 24.45 kg/m². YOB: 1950     AGE: 72 y.o. EX: female    PCP: Vega Etienne MD       IMPRESSION //  DIAGNOSIS AND TREATMENT PLAN      DIAGNOSES  1. Left Achilles tendinitis        Orders Placed This Encounter    Generic Supply Order     rubber heel insert    REFERRAL TO PHYSICAL THERAPY     Referral Priority:   Routine     Referral Type:   PT/OT/ST     Referral Reason:   Specialty Services Required     Requested Specialty:   Physical Therapy     Number of Visits Requested:   1       Plan:    1. DME order: rubber heel insert  2. Referral to PT with low frequency therpeutic ultrasound and [employer]  3. Encourage the pt to employ gentle runner's stretches at home. RTO- 1 month    Did review the MRI with her she understands her MRI she has Achilles distal mild tendinosis of the bones with Achilles tendon distally. Recommendations formal physical therapy reassess in 1 month. HPI //  Ericka 6 IS A 72 y.o. female who presents to my outpatient office for follow-up evaluation of left Achilles tendinitis. At the last visit, I instructed the pt continue with her activity modifications and take anti-inflammatories. Since the last OV, Zaire Gerber reports that she is still experiencing the same pain and soreness along her distal Achilles tendon. I personally reviewed the MRI results with the patient and it demonstrated some small degenerative changes in the tendon and presence of a bone spur. She notes that she has worn the boot and has employed stretching at home. She notes that her physical activity has been extremely limited for the last three months due to the Achilles pain. She admits having trouble going up and down stairs. The pt is concerned with some the swelling in her LE.      The pt notes that she will be going out of town for a week and inquired whether she would need to take the boot. On examination 6/23/2020 , the patient is alert, oriented (name, place, time) and follows commands. she is in no acute distress and her affect and mood are appropriate. Visit Vitals  BP (!) 129/94   Pulse 75   Temp (!) 96 °F (35.6 °C) (Skin)   Ht 5' 3\" (1.6 m)   Wt 138 lb (62.6 kg)   SpO2 97%   BMI 24.45 kg/m²           ANKLE/FOOT left     Psychiatry: Alert, Oriented x 3; Speech normal in context and clarity,                       Memory intact grossly, no involuntary movements - tremors, no dementia  Gait: slow  Tenderness: moderate tender achilles tendon without deformity palpable:                        Central distal achilles spur                    Moderate tenderness to distal Achilles  Swelling: moderate  Calf tenderness: Absent for calf or gastrocnemius muscle regions              Soft, supple, non tender, non taut lower extremity compartments              NONE to Medial Malleolar, 4/5 Met base midfoot, achilles, tib post, or              NONE to syndesmosis. no to plantar fascia, or central calcaneal region  Cutaneous: WNL, Joint Motion: WNL   Joint / Tendon Stability: No Ankle or Subtalar instability or joint laxity. No peroneal sublux ability or dislocation  Alignment: neutral Hindfoot, none Metatarsus Adductus Metatarsus. Neuro Motor/Sensory: NL/NL, Vascular: NL foot/ankle pulses  Lymphatics: No extremity lymphedema, No calf swelling, no tenderness to calf muscles. CHART REVIEW     Patient Active Problem List   Diagnosis Code    GERD (gastroesophageal reflux disease) K21.9    Gastric erosions K25.9    Incomplete bladder emptying R33.9        Orville Dickey has been experiencing pain and discomfort confirmed as outlined in the pain assessment outlined below.       Pain Assessment  6/23/2020   Location of Pain Ankle   Pain Location Comment -   Location Modifiers Left Severity of Pain 6   Quality of Pain Sharp   Duration of Pain -   Frequency of Pain Constant   Aggravating Factors Walking;Standing   Aggravating Factors Comment -   Limiting Behavior Yes   Relieving Factors Nothing   Result of Injury No        Miko Golden  has a past medical history of Anal fissure, GERD (gastroesophageal reflux disease), and H/O: hysterectomy (02/12/1998). She also has no past medical history of Difficult intubation, Malignant hyperthermia due to anesthesia, Nausea & vomiting, or Pseudocholinesterase deficiency. Patients is employed at:         Past Medical History:   Diagnosis Date    Anal fissure     GERD (gastroesophageal reflux disease)     H/O: hysterectomy 02/12/1998     Current Outpatient Medications   Medication Sig    alum-mag hydroxide-simeth (MYLANTA) 200-200-20 mg/5 mL susp Take 30 mL by mouth every four (4) hours as needed.  calcium carbonate (TUMS) 200 mg calcium (500 mg) chew Take 1 Tab by mouth daily.  loratadine (CLARITIN) 10 mg tablet Take 10 mg by mouth daily.  sucralfate (CARAFATE) 1 gram tablet Take 1 g by mouth four (4) times daily.  diclofenac (VOLTAREN) 1 % gel Apply 4 g to affected area two (2) times a day. Apply to affected lateral ankle twice daily    ranitidine (ZANTAC) 150 mg tablet Take 150 mg by mouth two (2) times a day. No current facility-administered medications for this visit. THE  FOR Wesley Burleson MD 6/23/2020 .    Allergies   Allergen Reactions    Penicillin G Rash     Past Surgical History:   Procedure Laterality Date    BREAST SURGERY PROCEDURE UNLISTED      reduction    HX ENDOSCOPY      HX GI      colonoscopy, EGD    HX GI      ANAL FISSURE    HX GYN      hysterectomy    HX HYSTERECTOMY  02/12/1998     Social History     Occupational History    Not on file   Tobacco Use    Smoking status: Never Smoker    Smokeless tobacco: Never Used   Substance and Sexual Activity    Alcohol use: No     Alcohol/week: 0.0 standard drinks    Drug use: No    Sexual activity: Not on file     Family History   Problem Relation Age of Onset    Diabetes Father     Diabetes Sister     Hypertension Mother     Stroke Mother     Hypertension Sister         DIAGNOSTIC IMAGING  LAB DATA      No results found for: HBA1C, HGBE8, XRO6PIYU, POQ7IGSR //   Lab Results   Component Value Date/Time    Glucose 85 02/08/2019 12:49 PM        No results found for: SLV8BREZ, UOA2NMPK      No results found for: VITD3, XQVID2, XQVID3, XQVID, VD3RIA, [id number]      REVIEW OF SYSTEMS : 6/23/2020  ALL BELOW ARE Negative except : SEE HPI     Constitutional: Negative for fever, chills and weight loss. Neg Weight Loss  Cardiovascular: Negative for chest pain, claudication and leg swelling. SOB, CALDERA   Gastrointestinal/Urological: Negative for pain, N/V/D/C, Blood in stool or urine,dysuria, Hematuria, Incontinence  Musculoskeletal: see HPI. Neurological: Negative for dizziness and weakness, headaches,Visual Changes or             Confusion, or Seizures,   Psychiatric/Behavioral: Negative for depression, memory loss and substance abuse. Extremities:  Negative for hair changes, rash or skin lesion changes. Hematologic: Negative for Bleeding problems, bruising, pallor or swollen lymph nodes. Peripheral Vascular: No calf pain, vascular vein tenderness to calf pain// No calf throbbing, posterior knee throbbing pain     DIAGNOSTIC IMAGING      MRI Results (most recent):  Results from Hospital Encounter encounter on 06/20/20   MRI ANKLE LT WO CONT    Narrative EXAM: MRI ANKLE LT WO CONT    CLINICAL INDICATION/HISTORY: 72 years Female. evaluate achilles tendon. Additional History: Chronic posterior ankle pain not responsive to nonsurgical  management. Clinical concern for interstitial tear of the Achilles tendon.     COMPARISON: None    TECHNIQUE: Multiplanar, multisequence MR imaging of the left ankle without contrast.    FINDINGS:    OSSEOUS STRUCTURES/JOINTS:   There is mild subchondral edema at the navicular bone at the articulation with  the medial and middle cuneiforms (sagittal, 9; axial, 18). The ankle mortise is preserved and the talar dome is smooth. There is a 0.7 x 0.4 x 0.7 cm subcortical cyst at the medial left fibula at the  level of the lateral mortise (coronal, 19). TENDONS  Flexor: Unremarkable  Peroneals: Unremarkable   Extensor: Unremarkable   Achilles: There is mild edema at the posterior calcaneus and the attachment of  the Achilles tendon (sagittal, 13). A subtle linear intermediate signal band  within the Achilles tendon at this site could represent a small interstitial  tear but normal fascicles probably favored. There is no overlying soft tissue  swelling, fluid or edema. LIGAMENTS  Lateral: Intact  Medial: Intact  Lisfranc: Intact    PLANTAR FASCIA: Unremarkable    MUSCULATURE: Unremarkable    SINUS TARSI: Clear    SOFT TISSUES/OTHER: Unremarkable      Impression IMPRESSION:    No clear etiology for the patient's clinical symptoms. There is mild marrow  edema at the attachment of the Achilles tendon but no significant tendon  thickening, peritendinous fluid or edema or retrocalcaneal bursal collection. Degenerative subcortical cyst formation is seen at the medial fibula at the  lateral ankle joint and the articulation with the navicular bone and cuneiforms. Please see above section of this report. I have personally reviewed the results of the above study. The interpretation of this study is my professional opinion.       Juliann Love MD  6/23/2020  5:12 PM

## 2020-06-24 ENCOUNTER — TELEPHONE (OUTPATIENT)
Dept: ORTHOPEDIC SURGERY | Age: 70
End: 2020-06-24

## 2020-06-24 NOTE — TELEPHONE ENCOUNTER
Patient went to [address] [place] [address] for her heel insert. They say they need to know specifically which one, as there are two.  Please clarify which type of insert     Patient [phone]

## 2020-06-26 ENCOUNTER — HOSPITAL ENCOUNTER (OUTPATIENT)
Dept: PHYSICAL THERAPY | Age: 70
Discharge: HOME | End: 2020-06-26
Payer: MEDICARE

## 2020-06-26 PROCEDURE — 97110 THERAPEUTIC EXERCISES: CPT

## 2020-06-26 PROCEDURE — 97162 PT EVAL MOD COMPLEX 30 MIN: CPT

## 2020-06-26 PROCEDURE — 97535 SELF CARE MNGMENT TRAINING: CPT

## 2020-06-26 NOTE — PROGRESS NOTES
PT DAILY TREATMENT NOTE/FOOT AND ANKLE EVAL 10-18    Patient Name: Alirio Tracy  Date:2020  : 1950  [x]  Patient  Verified  Payor: [place] MEDICARE / Plan: [place] MEDICARE PART A & B / Product Type: Medicare /    In time:1203  Out time:1244  Total Treatment Time (min): 41  Visit #: 1 of 8    Medicare/BCBS Only   Total Timed Codes (min):  41 1:1 Treatment Time:  41     Treatment Area: Left ankle pain [M25.572]    SUBJECTIVE  Pain Level (0-10 scale):worst: 7 best: 4 currently: 5  [x]constant []intermittent [x]improving []worsening []no change since onset    Any medication changes, allergies to medications, adverse drug reactions, diagnosis change, or new procedure performed?: [x] No    [] Yes (see summary sheet for update)  Subjective functional status/changes:     PLOF: I with ADLs, gym, walking/jogging, household chores, driving, retired, R hand dominate  Limitations to PLOF: pain  Mechanism of Injury: insidious onset of L ankle pain  Current symptoms/Complaints: wore a boot 3 weeks, wearing insert now, was walking daily and felt increased ankle pain; initially pain on bottom/lateral ankle and then shifted posteriorly into heel into achilles, sometimes numbness/tingling in all 5 digits at night; previous R hip pain and now she is having L hip pain prior to ankle pain; hx of prolapse disc in low back pain, and R knee pain, bone spurs on xray/MRI, difficulty sleeping regardless, sleeps on back, swelling, surgery is on the table if conservative doesn't work  Aggravating: prolonged sitting and first get up, walking, stairs, heel raises  Alleviating: moving  Previous Treatment/Compliance: Boot, orthotics in B shoes  PMHx/Surgical Hx: arthritis, hysterectomy, anal fissure, colonoscopy, endoscopy, hx of LBP, hx of B hip pain  Work Hx: retired  Living Situation: 2 story, 15 1 handrail, husbands  Pt Goals: \"improve mobility, decrease pain, get back to walking, and return to prior level of function\"  Barriers: []pain []financial []time []transportation []other  Motivation: good  Substance use: []Alcohol []Tobacco []other:   FABQ Score: []low []elevate  Cognition: A & O x 4    Other:    OBJECTIVE/EXAMINATION  Domestic Life: see above  Activity/Recreational Limitations: pain  Mobility: see gait assessment  Self Care: I    21 min [x]Eval                  []Re-Eval       10 min Therapeutic Exercise:  [x] See flow sheet :   Rationale: increase ROM, increase strength and education on HEP to improve the patients ability to complete functional activities. 10 min Self care: education on ice/elevation or heat for pain management; education on breaking up HEP up throughout the day to increase patient's ability to complete household chores.              With   [] TE   [] TA   [] neuro   [] other: Patient Education: [x] Review HEP    [] Progressed/Changed HEP based on:   [] positioning   [] body mechanics   [] transfers   [] heat/ice application    [] other:      Other Objective/Functional Measures:     Physical Therapy Evaluation  - Foot and Ankle    Gait: [] Normal    [] Abnormal    [x] Antalgic    [] NWB    Device:  Describe: dec step length on L, incr weight acceptance on R, hip drop B    ROM/Strength  [] Unable to assess at this time      AROM   Strength    Right Left Right Left        Hip  Flexion (120)   4 4    Extension   4- 4-    Abduction   4- 4-    Adduction    4+ 4+    IR        ER       Knee  Flexion (135)   4 4    Extension    4+ 4+     Ankle  Plantarflexion (50)   5 4+    Dorsiflexion (20)   5 4+    Inversion (35)   5 4+    Eversion (15)   5 4+           Flexibility: [] Unable to assess at this time  Gastroc:    (L) Tightness [] WNL   [x] Min   [] Mod   [] Severe    (R) Tightness [] WNL   [x] Min   [] Mod   [] Severe  Soleus:    (L) Tightness [] WNL   [] Min   [x] Mod   [] Severe    (R) Tightness [] WNL   [] Min   [x] Mod   [] Severe  Other:      (L) Tightness [] WNL   [] Min   [] Mod   [] Severe    (R) Tightness [] WNL [] Min   [] Mod   [] Severe    Palpation:   Location: L achilles tendon, calves, plantar fascia  Patient's Pain Response: [] Min   [x] Mod   [] Severe  Location: calcaneous  Patient's Pain Response: [] Min   [x] Mod   [] Severe    Swelling noted around L achilles and warmth to touch    Pes planus noted B  Optional Tests:    Anterior Drawer: [x] Neg    [] Pos  Posterior Drawer:  [x] Neg    [] Pos  Inversion Stress:  [x] Neg    [] Pos  Talar Tilt:   [x] Neg    [] Pos  Eversion Stress:  [x] Neg    [] Pos  Jacqui's Sign:  [] Neg    [] Pos  Herrera Test: [x] Neg    [] Pos    Other tests/ comments:   30 sec sit to stand: 14x  SLS R 19\" L 17\" no UE floor EO     Access Code: [email or web address]   URL: [email or web address]   Date: 06/26/2020   Prepared by: Marleen Kaur     Exercises   Seated Plantar Fascia Stretch - 2 reps - 1 sets - 30\" hold - 2x daily - 7x weekly   Gastroc Stretch on Wall - 2 reps - 1 sets - 30\" hold - 2x daily - 7x weekly   Soleus Stretch on Wall - 2 reps - 1 sets - 30\" hold - 2x daily - 7x weekly   Ankle Dorsiflexion with Resistance - 10 reps - 2 sets - 1x daily - 7x weekly   Ankle Eversion with Resistance - 10 reps - 2 sets - 1x daily - 7x weekly   Ankle Inversion with Resistance - 10 reps - 2 sets - 1x daily - 7x weekly   Clamshell - 10 reps - 2 sets - 1x daily - 7x weekly   Ankle and Toe Plantarflexion with Resistance - 10 reps - 2 sets - 1x daily - 7x weekly   Supine Bridge - 10 reps - 2 sets - 5 hold - 1x daily - 7x weekly   Seated Long Arc Quad - 10 reps - 2 sets - 1x daily - 7x weekly   Seated Toe Towel Scrunches - 10 reps - 2 sets - 5 hold - 1x daily - 7x weekly   Sit to Stand - 10 reps - 2 sets - 1x daily - 7x weekly   Patient Education   Ice     Pain Level (0-10 scale) post treatment: \"same\"    ASSESSMENT/Changes in Function: Pt is a 71 y/o female presenting to outpatient physical therapy with complaints of L ankle pain that began in May 2020 with insidious onset after increased activity. MRI showed bone spurs on L heel. Pt was given heel cups and has been wearing them. After PT evaluation, findings and symptoms are consistent with achilles tendonitis as well as pes planus appreciated in B feet. Pt would benefit from orthotics with arched support. Pt is a good candidate for skilled PT interventions with plan to progress and modify therapeutic interventions to address ROM deficits, strength deficits, endurance deficits, mobility deficits, balance training, and patient education to increase patient's abilities to complete functional and community activities with decreased pain. Written HEP was completed and hardcopy was given to patient to complete daily at home; pt verbalized understanding. [x]  See Plan of Care  []  See progress note/recertification  []  See Discharge Summary         Progress towards goals / Updated goals:  Short term goals to be completed in 2 weeks  1. Pt will be compliant and I with HEP to help reduce pain and improve abilities to complete ADLs. EVAL: written hardcopy given and initiated exercises              CURRENT:  2. Pt will report no higher than 4/10 pain to improve patient's ability to self care              EVAL: worst 7               CURRENT:                CURRENT:  Long term goals to be completed in 4 weeks  1. Pt will demonstrate 76 FOTO score to improve abilities to household chores. EVAL:  51              CURRENT:  2. Pt will report no higher than 2/10 pain to improve patient's ability to complete complete self care independently. EVAL: worst 7/10              CURRENT:  3. Pt will demonstrate at least 4+/5 strength in B LE to improve patient's ability to get out of chair with equal weight distrubution. EVAL: hip F B 4, ext B 4-, abd B 4-, knee F B 4              CURRENT:  4.  Pt will demonstrate at least 30 seconds of SLS EO no UE on compliant surface to increase motor and sensory balance strategies to improve strength and decrease risk of falls. EVAL:  SLS R 19\" L 17\" no UE floor EO              CURRENT:  5. Pt will negotiate stairs of 8 inch height x6 I with no pain to increase ability to get into home safely.               EVAL: DNT              CURRENT:      PLAN  []  Upgrade activities as tolerated     [x]  Continue plan of care  []  Update interventions per flow sheet       []  Discharge due to:_  []  Other:_      Milena Orellana 6/26/2020  12:00 PM

## 2020-06-26 NOTE — PROGRESS NOTES
[facility]  [address]  [place], [address]  [phone] fax    Plan of Care/ Statement of Necessity for Physical Therapy Services    Patient name: Huan Lemon Start of Care: 2020   Referral source: Loyda May MD : 1950    Medical Diagnosis: Left ankle pain [M25.572]  Payor: VA MEDICARE / Plan: VA MEDICARE PART A & B / Product Type: Medicare /  Onset Date:May 2020    Treatment Diagnosis: L ankle pain   Prior Hospitalization: see medical history Provider#: [id number]   Medications: Verified on Patient summary List    Comorbidities: arthritis, hysterectomy, anal fissure, colonoscopy, endoscopy, hx of LBP, hx of B hip pain   Prior Level of Function: I with ADLs, gym, walking/jogging, household chores, driving, retired, R hand dominate      The Plan of Care and following information is based on the information from the initial evaluation. Assessment/ key information: Pt is a 73 y/o female presenting to outpatient physical therapy with complaints of L ankle pain that began in May 2020 with insidious onset after increased activity. MRI showed bone spurs on L heel. Pt was given heel cups and has been wearing them. After PT evaluation, findings and symptoms are consistent with achilles tendonitis as well as pes planus appreciated in B feet. Pt would benefit from orthotics with arched support. Pt is a good candidate for skilled PT interventions with plan to progress and modify therapeutic interventions to address ROM deficits, strength deficits, endurance deficits, mobility deficits, balance training, and patient education to increase patient's abilities to complete functional and community activities with decreased pain. Written HEP was completed and hardcopy was given to patient to complete daily at home; pt verbalized understanding.   Evaluation Complexity History HIGH Complexity :3+ comorbidities / personal factors will impact the outcome/ POC ; Examination MEDIUM Complexity : 3 Standardized tests and measures addressing body structure, function, activity limitation and / or participation in recreation  ;Presentation MEDIUM Complexity : Evolving with changing characteristics  ; Clinical Decision Making MEDIUM Complexity : FOTO score of 26-74  Overall Complexity Rating: MEDIUM  Problem List: pain affecting function, decrease ROM, decrease strength, impaired gait/ balance, decrease ADL/ functional abilitiies, decrease activity tolerance, decrease flexibility/ joint mobility and decrease transfer abilities   Treatment Plan may include any combination of the following: Therapeutic exercise, Therapeutic activities, Neuromuscular re-education, Physical agent/modality, Gait/balance training, Manual therapy, Patient education, Self Care training, Functional mobility training, Home safety training and Stair training  Patient / Family readiness to learn indicated by: asking questions, trying to perform skills and interest  Persons(s) to be included in education: patient (P)  Barriers to Learning/Limitations: None  Patient Goal (s): improve mobility, decrease pain, get back to walking, and return to prior level of function\"  Patient Self Reported Health Status: good  Rehabilitation Potential: good    Short term goals to be completed in 2 weeks  1. Pt will be compliant and I with HEP to help reduce pain and improve abilities to complete ADLs.             EVAL: written hardcopy given and initiated exercises              CURRENT:  2. Pt will report no higher than 4/10 pain to improve patient's ability to self care              EVAL: worst 7               CURRENT:     Long term goals to be completed in 4 weeks  1. Pt will demonstrate 68 FOTO score to improve abilities to household chores.  Ahmed Baptise:  [birth year]              CURRENT:  2.  Pt will report no higher than 2/10 pain to improve patient's ability to complete complete self care independently.              EVAL: worst 7/10              CURRENT:  3. Pt will demonstrate at least 4+/5 strength in B LE to improve patient's ability to get out of chair with equal weight distrubution.               EVAL: hip F B 4, ext B 4-, abd B 4-, knee F B 4              CURRENT:  4. Pt will demonstrate at least 30 seconds of SLS EO no UE on compliant surface to increase motor and sensory balance strategies to improve strength and decrease risk of falls.              EVAL:  SLS R 19\" L 17\" no UE floor EO              CURRENT:  5. Pt will negotiate stairs of 8 inch height x6 I with no pain to increase ability to get into home safely.              EVAL: DNT              CURRENT:  Frequency / Duration: Patient to be seen 2 times per week for 4 weeks. Patient/ Caregiver education and instruction: Diagnosis, prognosis, self care, activity modification and exercises   [x]  Plan of care has been reviewed with PTA    Certification Period: 6/26/20 to 7/25/20  Caitlyn Coats 6/26/2020 1:49 PM    ________________________________________________________________________    I certify that the above Therapy Services are being furnished while the patient is under my care. I agree with the treatment plan and certify that this therapy is necessary.     [de-identified] Signature:____________________  Date:____________Time: _________    Please sign and return to [facility] [address] [address] [address]  [place], [address]  [phone] fax

## 2020-07-01 ENCOUNTER — HOSPITAL ENCOUNTER (OUTPATIENT)
Dept: PHYSICAL THERAPY | Age: 70
Discharge: HOME | End: 2020-07-01
Payer: MEDICARE

## 2020-07-01 PROCEDURE — 97140 MANUAL THERAPY 1/> REGIONS: CPT

## 2020-07-01 PROCEDURE — 97110 THERAPEUTIC EXERCISES: CPT

## 2020-07-01 NOTE — PROGRESS NOTES
PT DAILY TREATMENT NOTE 10-18    Patient Name: Sindy Gaines  Date:2020  : 1950  [x]  Patient  Verified  Payor: VA MEDICARE / Plan: VA MEDICARE PART A & B / Product Type: Medicare /    In time:10:15  Out time:11:15  Total Treatment Time (min): 60  Visit #: 2 of 8    Medicare/BCBS Only   Total Timed Codes (min):  60 1:1 Treatment Time:  50       Treatment Area: Left ankle pain [M25.572]    SUBJECTIVE  Pain Level (0-10 scale): 7  Any medication changes, allergies to medications, adverse drug reactions, diagnosis change, or new procedure performed?: [x] No    [] Yes (see summary sheet for update)  Subjective functional status/changes:   [] No changes reported  Pt reports her entire ankle is aching and has been aching all morning. OBJECTIVE    Modality rationale: decrease inflammation and decrease pain to improve the patients ability to tolerate ADL's.    Min Type Additional Details    [] Estim:  []Unatt       []IFC  []Premod                        []Other:  []w/ice   []w/heat  Position:  Location:    [] Estim: []Att    []TENS instruct  []NMES                    []Other:  []w/US   []w/ice   []w/heat  Position:  Location:    []  Traction: [] Cervical       []Lumbar                       [] Prone          []Supine                       []Intermittent   []Continuous Lbs:  [] before manual  [] after manual    []  Ultrasound: []Continuous   [] Pulsed                           []1MHz   []3MHz W/cm2:  Location:    []  Iontophoresis with dexamethasone         Location: [] Take home patch   [] In clinic   10 [x]  Ice     []  heat  []  Ice massage  []  Laser   []  Anodyne Position: long sit  Location: left ankle    []  Laser with stim  []  Other:  Position:  Location:    []  Vasopneumatic Device Pressure:       [] lo [] med [] hi   Temperature: [] lo [] med [] hi   [] Skin assessment post-treatment:  []intact []redness- no adverse reaction    []redness  adverse reaction:         42 min Therapeutic Exercise: [x] See flow sheet :   Rationale: increase ROM and increase strength to improve the patients ability to perform functional task with ease. 8 min Manual Therapy:  Manual stretching (flex/Ext/IV/EV/great toe) to left ankle in long sitting. STM to plantar aspect of left foot/ achilles/ and gastroc/soleus in longsit. Rationale: decrease pain, increase ROM and increase tissue extensibility to improve functional mobility with ambulation. With   [] TE   [] TA   [] neuro   [] other: Patient Education: [x] Review HEP    [] Progressed/Changed HEP based on:   [] positioning   [] body mechanics   [] transfers   [] heat/ice application    [] other:      Other Objective/Functional Measures: initiated therex per flow sheet     Pain Level (0-10 scale) post treatment: 8    ASSESSMENT/Changes in Function:   Pt first f/u session displaying a good tolerance to therex and putting forth a good effort. HEP reviewed for understanding with soleus stretching with pt verbally expressing understanding. Pt able to complete therex as prescribed with no complaints of increased pain in the left ankle. Pt reports some muscle soreness post modalities but left in no apparent distress. Patient will continue to benefit from skilled PT services to modify and progress therapeutic interventions, address functional mobility deficits, address ROM deficits, address strength deficits, analyze and address soft tissue restrictions, analyze and cue movement patterns, analyze and modify body mechanics/ergonomics and assess and modify postural abnormalities to attain remaining goals. [x]  See Plan of Care  []  See progress note/recertification  []  See Discharge Summary         Progress towards goals / Updated goals:  Short term goals to be completed in 2 weeks  1. Pt will be compliant and I with HEP to help reduce pain and improve abilities to complete ADLs.               EVAL: written hardcopy given and initiated exercises              CURRENT: Met 7/1/20 pt reports understanding and compliance  2. Pt will report no higher than 4/10 pain to improve patient's ability to self care              EVAL: worst 7               CURRENT:                 CURRENT:  Long term goals to be completed in 4 weeks  1. Pt will demonstrate 68 FOTO score to improve abilities to household chores.  Charlotte Perez:  52              CURRENT:  2. Pt will report no higher than 2/10 pain to improve patient's ability to complete complete self care independently.              EVAL: worst 7/10              CURRENT:  3.  Pt will demonstrate at least 4+/5 strength in B LE to improve patient's ability to get out of chair with equal weight distrubution.               EVAL: hip F B 4, ext B 4-, abd B 4-, knee F B 4              CURRENT:  4. Pt will demonstrate at least 30 seconds of SLS EO no UE on compliant surface to increase motor and sensory balance strategies to improve strength and decrease risk of falls.              EVAL:  SLS R 19\" L 17\" no UE floor EO              CURRENT:  5. Pt will negotiate stairs of 8 inch height x6 I with no pain to increase ability to get into home safely.              EVAL: DNT              CURRENT:    PLAN  []  Upgrade activities as tolerated     [x]  Continue plan of care  []  Update interventions per flow sheet       []  Discharge due to:_  []  Other:_      Lawrence Merchant PTA 7/1/2020  8:34 AM    Future Appointments   Date Time Provider Meghna Moreno   7/1/2020 10:15 AM Bryan Solano, PTA [facility]   7/9/2020  2:45 PM Alfonza Mortimer [facility]   7/10/2020  9:30 AM Natasha Strong, PT [facility]   7/14/2020 11:15 AM Natasha Strong, PT [facility]   7/16/2020 11:00 AM Natasha Strong, PT [facility]   7/21/2020 11:15 AM Natasha Strong, PT [facility]   7/23/2020 11:00 AM Natasha Strong, PT [facility]   7/28/2020 11:15 AM Natasha Strong, PT [facility]   7/30/2020 11:00 AM Natasha Strong, PT [facility]

## 2020-07-09 ENCOUNTER — HOSPITAL ENCOUNTER (OUTPATIENT)
Dept: PHYSICAL THERAPY | Age: 70
Discharge: HOME | End: 2020-07-09
Payer: MEDICARE

## 2020-07-09 PROCEDURE — 97140 MANUAL THERAPY 1/> REGIONS: CPT

## 2020-07-09 PROCEDURE — 97530 THERAPEUTIC ACTIVITIES: CPT

## 2020-07-09 PROCEDURE — 97016 VASOPNEUMATIC DEVICE THERAPY: CPT

## 2020-07-09 PROCEDURE — 97110 THERAPEUTIC EXERCISES: CPT

## 2020-07-09 NOTE — PROGRESS NOTES
PT DAILY TREATMENT NOTE 10-18    Patient Name: Jose Solomon  Date:2020  : 1950  [x]  Patient  Verified  Payor: [place] MEDICARE / Plan: [place] MEDICARE PART A & B / Product Type: Medicare /    In time:245  Out time:342  Total Treatment Time (min): 62  Visit #: 3 of 8    Medicare/BCBS Only   Total Timed Codes (min):  47 1:1 Treatment Time:  47       Treatment Area: Left ankle pain [M25.572]    SUBJECTIVE  Pain Level (0-10 scale): 7  Any medication changes, allergies to medications, adverse drug reactions, diagnosis change, or new procedure performed?: [x] No    [] Yes (see summary sheet for update)  Subjective functional status/changes:   [] No changes reported  Pt reports she has been having more pain. She has been wearing heel cup. She sees doctor on Monday. Hep is being completed. OBJECTIVE    Modality rationale: decrease inflammation and decrease pain to improve the patients ability to complete functional activities.    Min Type Additional Details    [] Estim:  []Unatt       []IFC  []Premod                        []Other:  []w/ice   []w/heat  Position:  Location:    [] Estim: []Att    []TENS instruct  []NMES                    []Other:  []w/US   []w/ice   []w/heat  Position:  Location:    []  Traction: [] Cervical       []Lumbar                       [] Prone          []Supine                       []Intermittent   []Continuous Lbs:  [] before manual  [] after manual    []  Ultrasound: []Continuous   [] Pulsed                           []1MHz   []3MHz W/cm2:  Location:    []  Iontophoresis with dexamethasone         Location: [] Take home patch   [] In clinic    []  Ice     []  heat  []  Ice massage  []  Laser   []  Anodyne Position:  Location:    []  Laser with stim  []  Other:  Position:  Location:   10 [x]  Vasopneumatic Device  Semi reclined  L ankle Pressure:       [x] lo [] med [] hi   Temperature: [x] lo [] med [] hi   [x] Skin assessment post-treatment:  [x]intact []redness- no adverse reaction    []redness  adverse reaction:     17 min Therapeutic Exercise:  [x] See flow sheet :   Rationale: increase ROM and increase strength to improve the patients ability to complete functional activities. 15 min Therapeutic Activity:  [x]  See flow sheet :   Rationale: increase ROM, increase strength, improve coordination, increase proprioception and stair training  to improve the patients ability to complete household chores. 15 min Manual Therapy:  Prone: L ankle IASTM to achilles/calves/plantar fascia; STM/DTM to calves   Rationale: decrease pain, increase ROM, increase tissue extensibility, decrease edema  and decrease trigger points to improve patient's abilities to get around at home safely. With   [x] TE   [] TA   [x] neuro   [] other: Patient Education: [x] Review HEP    [] Progressed/Changed HEP based on:   [] positioning   [] body mechanics   [] transfers   [] heat/ice application    [] other:      Other Objective/Functional Measures:   Stairs 6\"  1. Taps 10x ea  2. Step up/downs forward & lat 10x ea      Pain Level (0-10 scale) post treatment: \"same\"    ASSESSMENT/Changes in Function: Session focused on increasing strength and endurance with good return demo. Progressed pt to completing stair exercises to increase abilities to get around home safely. Verbal cues and demonstration required to use correct body mechanics. Encouraged pt to complete HEP daily to help to continue to progress PT interventions to improve patient's ability to complete functional and community task independently. At the conclusion of the session, pt reported no change in pain levels.     Patient will continue to benefit from skilled PT services to modify and progress therapeutic interventions, address functional mobility deficits, address ROM deficits, address strength deficits, analyze and address soft tissue restrictions, analyze and cue movement patterns and analyze and modify body mechanics/ergonomics to attain remaining goals. [x]  See Plan of Care  []  See progress note/recertification  []  See Discharge Summary         Progress towards goals / Updated goals:  Short term goals to be completed in 2 weeks  1. Pt will be compliant and I with HEP to help reduce pain and improve abilities to complete ADLs.             EVAL: written hardcopy given and initiated exercises              CURRENT: Met 7/1/20 pt reports understanding and compliance  2. Pt will report no higher than 4/10 pain to improve patient's ability to self care              EVAL: worst 7               CURRENT: 7 7/9     Long term goals to be completed in 4 weeks  1. Pt will demonstrate 68 FOTO score to improve abilities to household chores.              RQOE:  66              CURRENT:  2. Pt will report no higher than 2/10 pain to improve patient's ability to complete complete self care independently.              EVAL: worst 7/10              CURRENT: 7 7/9  3.  Pt will demonstrate at least 4+/5 strength in B LE to improve patient's ability to get out of chair with equal weight distrubution.               EVAL: hip F B 4, ext B 4-, abd B 4-, knee F B 4              CURRENT:  4. Pt will demonstrate at least 30 seconds of SLS EO no UE on compliant surface to increase motor and sensory balance strategies to improve strength and decrease risk of falls.              EVAL:  SLS R 19\" L 17\" no UE floor EO              CURRENT:  5. Pt will negotiate stairs of 8 inch height x6 I with no pain to increase ability to get into home safely.              EVAL: DNT              CURRENT:    PLAN  []  Upgrade activities as tolerated     [x]  Continue plan of care  []  Update interventions per flow sheet       []  Discharge due to:_  []  Other:_      Ana Jay 7/9/2020  2:48 PM    Future Appointments   Date Time Provider Meghna Moreno   7/10/2020  9:30 AM [facility]   7/14/2020 11:15 AM [facility]   7/16/2020 11:00 AM Carlos Salcedo, PT [facility]   7/21/2020 11:15 AM Carlos Salcedo, PT [facility]   7/23/2020 11:00 AM Carlos Salcedo, PT [facility]   7/28/2020 11:15 AM Carlos Salcedo, PT [facility]   7/30/2020 11:00 AM Carlos Salcedo, PT [facility]

## 2020-07-10 ENCOUNTER — HOSPITAL ENCOUNTER (OUTPATIENT)
Dept: PHYSICAL THERAPY | Age: 70
Discharge: HOME | End: 2020-07-10
Payer: MEDICARE

## 2020-07-10 PROCEDURE — 97110 THERAPEUTIC EXERCISES: CPT

## 2020-07-10 PROCEDURE — 97140 MANUAL THERAPY 1/> REGIONS: CPT

## 2020-07-10 PROCEDURE — 97112 NEUROMUSCULAR REEDUCATION: CPT

## 2020-07-10 NOTE — PROGRESS NOTES
PT DAILY TREATMENT NOTE 10-18    Patient Name: Clarice Maher  Date:7/10/2020  : 1950  [x]  Patient  Verified  Payor: [place] MEDICARE / Plan: [place] MEDICARE PART A & B / Product Type: Medicare /    In time:932  Out time:1025  Total Treatment Time (min): 53  Visit #: 4 of 8    Medicare/BCBS Only   Total Timed Codes (min):  53 1:1 Treatment Time:  43       Treatment Area: Left ankle pain [M25.572]    SUBJECTIVE  Pain Level (0-10 scale): 7  Any medication changes, allergies to medications, adverse drug reactions, diagnosis change, or new procedure performed?: [x] No    [] Yes (see summary sheet for update)  Subjective functional status/changes:   [] No changes reported  Reports did not perform HEP yesterday as she had therapy yesterday. OBJECTIVE    Modality rationale: decrease inflammation and decrease pain to improve the patients ability to perform pain free ADLs   Min Type Additional Details   10  [x]  Vasopneumatic Device Pressure:       [x] lo [] med [] hi   Temperature: [x] lo [] med [] hi   [] Skin assessment post-treatment:  []intact []redness- no adverse reaction    []redness  adverse reaction:      27 min Therapeutic Exercise:  [x] See flow sheet : exercises initiated per flowsheet   Rationale: increase ROM and increase strength to improve the patients ability to perform ADLs with improved ease. 8 min Neuromuscular Re-education:  [x]  See flow sheet : exercises per flowsheet   Rationale: improve balance and increase proprioception  to improve the patients ability to ambulate with improved proprioceptive control. 8 min Manual Therapy:  IASTM to gastroc and achilles tendon with pt in prone followed by STM/DTM of gastroc.    Rationale: increase ROM, increase tissue extensibility and decrease trigger points to improve ease of ambulation          With   [] TE   [] TA   [] neuro   [] other: Patient Education: [x] Review HEP    [] Progressed/Changed HEP based on:   [] positioning   [] body mechanics [] transfers   [] heat/ice application    [] other:      Pain Level (0-10 scale) post treatment: 7    ASSESSMENT/Changes in Function: Pt performs exercises as directed. Continues to wear heel cups in shoes for pain. Pt requested information regarding ankle orthotics that evaluating therapist mentioned. Pt informed she would receive information at her next visit. Patient will continue to benefit from skilled PT services to modify and progress therapeutic interventions, address functional mobility deficits, address ROM deficits, address strength deficits, analyze and address soft tissue restrictions, analyze and cue movement patterns, analyze and modify body mechanics/ergonomics, assess and modify postural abnormalities and instruct in home and community integration to attain remaining goals. [x]  See Plan of Care  []  See progress note/recertification  []  See Discharge Summary         Progress towards goals / Updated goals:  Short term goals to be completed in 2 weeks  1. Pt will be compliant and I with HEP to help reduce pain and improve abilities to complete ADLs.             EVAL: written hardcopy given and initiated exercises              CURRENT: Met 7/1/20 pt reports understanding and compliance  2. Pt will report no higher than 4/10 pain to improve patient's ability to self care              EVAL: worst 7               CURRENT: 7 7/9     Long term goals to be completed in 4 weeks  1. Pt will demonstrate 68 FOTO score to improve abilities to household chores.              SMAX:  25              CURRENT:  2. Pt will report no higher than 2/10 pain to improve patient's ability to complete complete self care independently.              EVAL: worst 7/10              CURRENT: 7 7/9  3.  Pt will demonstrate at least 4+/5 strength in B LE to improve patient's ability to get out of chair with equal weight distrubution.               EVAL: hip F B 4, ext B 4-, abd B 4-, knee F B 4              CURRENT:  4. Pt will demonstrate at least 30 seconds of SLS EO no UE on compliant surface to increase motor and sensory balance strategies to improve strength and decrease risk of falls.              EVAL:  SLS R 19\" L 17\" no UE floor EO              CURRENT:  5. Pt will negotiate stairs of 8 inch height x6 I with no pain to increase ability to get into home safely.              EVAL: DNT              CURRENT:    PLAN  []  Upgrade activities as tolerated     [x]  Continue plan of care  []  Update interventions per flow sheet       []  Discharge due to:_  []  Other:_      Juan Carlos Cavanaugh, PT 7/10/2020  9:38 AM    Future Appointments   Date Time Provider Meghna Moreno   7/14/2020 11:15 AM Sharyle Dross, PT [facility]   7/16/2020 11:00 AM Derikyle Ira, PT [facility]   7/21/2020 11:15 AM Sharyle Dross, PT [facility]   7/23/2020 11:00 AM Sharyle Dross, PT [facility]   7/28/2020 11:15 AM Derikyle Ira, PT [facility]   7/30/2020 11:00 AM Derikyle Paruls, PT [facility]

## 2020-07-14 ENCOUNTER — OFFICE VISIT (OUTPATIENT)
Dept: ORTHOPEDIC SURGERY | Facility: CLINIC | Age: 70
End: 2020-07-14

## 2020-07-14 ENCOUNTER — APPOINTMENT (OUTPATIENT)
Dept: PHYSICAL THERAPY | Age: 70
End: 2020-07-14
Payer: MEDICARE

## 2020-07-14 VITALS
HEART RATE: 73 BPM | WEIGHT: 141 LBS | DIASTOLIC BLOOD PRESSURE: 83 MMHG | HEIGHT: 63 IN | SYSTOLIC BLOOD PRESSURE: 129 MMHG | BODY MASS INDEX: 24.98 KG/M2 | TEMPERATURE: 97.9 F

## 2020-07-14 DIAGNOSIS — M70.62 TROCHANTERIC BURSITIS OF LEFT HIP: ICD-10-CM

## 2020-07-14 DIAGNOSIS — M25.552 PAIN IN JOINT OF LEFT HIP: Primary | ICD-10-CM

## 2020-07-14 RX ORDER — TRIAMCINOLONE ACETONIDE 40 MG/ML
40 INJECTION, SUSPENSION INTRA-ARTICULAR; INTRAMUSCULAR ONCE
Qty: 1 ML | Refills: 0
Start: 2020-07-14 | End: 2020-07-14

## 2020-07-16 ENCOUNTER — HOSPITAL ENCOUNTER (OUTPATIENT)
Dept: PHYSICAL THERAPY | Age: 70
Discharge: HOME | End: 2020-07-16
Payer: MEDICARE

## 2020-07-16 PROCEDURE — 97535 SELF CARE MNGMENT TRAINING: CPT

## 2020-07-16 PROCEDURE — 97110 THERAPEUTIC EXERCISES: CPT

## 2020-07-16 NOTE — PROGRESS NOTES
PT DAILY TREATMENT NOTE 10-18    Patient Name: Alfonso Lara  Date:2020  : 1950  [x]  Patient  Verified  Payor: [place] MEDICARE / Plan: [place] MEDICARE PART A & B / Product Type: Medicare /    In time:1056  Out time:1145  Total Treatment Time (min): 49  Visit #: 5 of 8    Medicare/BCBS Only   Total Timed Codes (min):  49 1:1 Treatment Time:  40       Treatment Area: Left ankle pain [M25.572]    SUBJECTIVE  Pain Level (0-10 scale): 8 in left hip, 6 in left ankle  Any medication changes, allergies to medications, adverse drug reactions, diagnosis change, or new procedure performed?: [x] No    [] Yes (see summary sheet for update)  Subjective functional status/changes:   [] No changes reported  Pt reports  doing standing straight leg raises like in therapy and when performing the single leg stance of the left LE, felt \"something pop\" and having a lot of pain. Pt received an xray which did not find anything of concern and the PA who saw her provided a script for 2 sessions to treat left hip bursitis. OBJECTIVE    25 min Therapeutic Exercise:  [x] See flow sheet : exercises initiated per flowsheet   Rationale: increase ROM and increase strength to improve the patients ability to perform ADLs. 15 min Self Care/Home Management:  Performed brief re-assessment of the left LE due to increase left hip pain following treatment of the left ankle. Educated pt that hip inflammation is likely due to a resulting muscle tendonitis from left ankle compensation strategies and from overusing the left hip. Instructed the patient in TFL stretching and to utilize ice with good teach back and pain relief. Education to stretch often but decrease exercise to 1-2x/daily. Rationale: increase ROM  to improve the patients ability to self-manage pain.            With   [] TE   [] TA   [] neuro   [] other: Patient Education: [x] Review HEP    [] Progressed/Changed HEP based on:   [] positioning   [] body mechanics   [] transfers   [] heat/ice application    [] other:      Other Objective/Functional Measures: no presence of hip labral tears or instability, some decrease in ITB flexibility but hip dos come down below table. Pain Level (0-10 scale) post treatment: 6 in left ankle and hip    ASSESSMENT/Changes in Function: Performed brief re-assessment of the left LE due to increase left hip pain following treatment of the left ankle. Educated pt that hip inflammation is likely due to a resulting muscle tendonitis from left ankle compensation strategies and from overusing the left hip. Instructed the patient in TFL stretching and to utilize ice with good teach back and pain relief. Education to stretch often but decrease exercise to 1-2x/daily due to pt misunderstanding the difference between stretching and strengthening with good understanding following education. Informed pt that I sent a staff message to Dr. Hiram Montenegro for a referral to a podiatrist to get custom foot orthotics to reduce tendonitis pain and predisposition to ankle injury and overuse. Pt states that she will call the office to follow-up with him. Discussed with pt that we will continue treating the ankle and monitor the left hip with pt agreement that we will only discharge if the hip worsens or does not respond to gentle stretching and strengthening done during ankle rehab. Patient will continue to benefit from skilled PT services to modify and progress therapeutic interventions, address functional mobility deficits, address ROM deficits, address strength deficits, analyze and address soft tissue restrictions, analyze and cue movement patterns, analyze and modify body mechanics/ergonomics, assess and modify postural abnormalities and instruct in home and community integration to attain remaining goals.      [x]  See Plan of Care  []  See progress note/recertification  []  See Discharge Summary         Progress towards goals / Updated goals:  Short term goals to be completed in 2 weeks  1. Pt will be compliant and I with HEP to help reduce pain and improve abilities to complete ADLs.             EVAL: written hardcopy given and initiated exercises              CURRENT: Met 7/1/20 pt reports understanding and compliance  2. Pt will report no higher than 4/10 pain to improve patient's ability to self care              EVAL: worst 7               CURRENT: 7 7/9     Long term goals to be completed in 4 weeks  1. Pt will demonstrate 68 FOTO score to improve abilities to household chores.              YAHM:  72              CURRENT:  2. Pt will report no higher than 2/10 pain to improve patient's ability to complete complete self care independently.              EVAL: worst 7/10              CURRENT: 7 7/9  3.  Pt will demonstrate at least 4+/5 strength in B LE to improve patient's ability to get out of chair with equal weight distrubution.               EVAL: hip F B 4, ext B 4-, abd B 4-, knee F B 4              CURRENT: progressing, B LE grossly 4/5 (7/16/2020)  4. Pt will demonstrate at least 30 seconds of SLS EO no UE on compliant surface to increase motor and sensory balance strategies to improve strength and decrease risk of falls.              EVAL:  SLS R 19\" L 17\" no UE floor EO              CURRENT:  5. Pt will negotiate stairs of 8 inch height x6 I with no pain to increase ability to get into home safely.              EVAL: DNT              CURRENT:    PLAN  []  Upgrade activities as tolerated     []  Continue plan of care  []  Update interventions per flow sheet       [x]  Discharge due to: hip pain  []  Other:_      Matthew Tavares, PT 7/16/2020  11:25 AM    Future Appointments   Date Time Provider Meghna Moreno   7/21/2020 11:15 [facility]   7/23/2020 11:00 AM [facility]   7/28/2020 11:15 AM [facility]   7/30/2020 11:00 AM [facility]

## 2020-07-20 ENCOUNTER — TELEPHONE (OUTPATIENT)
Dept: ORTHOPEDIC SURGERY | Age: 70
End: 2020-07-20

## 2020-07-20 NOTE — TELEPHONE ENCOUNTER
Physical therapy has told the patient to call and request a referral to podiatry for special orthotics. Please inform patient when this is available.      Patient [phone] cell [phone]

## 2020-07-21 ENCOUNTER — HOSPITAL ENCOUNTER (OUTPATIENT)
Dept: PHYSICAL THERAPY | Age: 70
Discharge: HOME | End: 2020-07-21
Payer: MEDICARE

## 2020-07-21 DIAGNOSIS — M76.62 ACHILLES TENDINITIS, LEFT LEG: Primary | ICD-10-CM

## 2020-07-21 PROCEDURE — 97110 THERAPEUTIC EXERCISES: CPT

## 2020-07-21 PROCEDURE — 97140 MANUAL THERAPY 1/> REGIONS: CPT

## 2020-07-21 NOTE — TELEPHONE ENCOUNTER
Okay to provide order for custom orthotics as requested.     Katiuska Vo PA-C, [facility]  7/21/2020   11:53 AM

## 2020-07-21 NOTE — PROGRESS NOTES
PT DAILY TREATMENT NOTE 10-18    Patient Name: Kendra Pacheco  Date:2020  : 1950  [x]  Patient  Verified  Payor: [place] MEDICARE / Plan: [place] MEDICARE PART A & B / Product Type: Medicare /    In time:1113  Out time:1201  Total Treatment Time (min): 48  Visit #: 6 of 8    Medicare/BCBS Only   Total Timed Codes (min):  48 1:1 Treatment Time:  48       Treatment Area: Left ankle pain [M25.572]    SUBJECTIVE  Pain Level (0-10 scale): 4 left ankle, 3 left hip  Any medication changes, allergies to medications, adverse drug reactions, diagnosis change, or new procedure performed?: [x] No    [] Yes (see summary sheet for update)  Subjective functional status/changes:   [] No changes reported  Pt reports stretching has helped with her hip and her ankle is feeling a bit better. OBJECTIVE    40 min Therapeutic Exercise:  [x] See flow sheet :   Rationale: increase ROM and increase strength to improve the patients ability to perform pain free ADLs. 8 min Manual Therapy:  Performed IASTM to the gastroc and achilles tendon followed by deep tissue massage to the gastroc. Rationale: increase ROM, increase tissue extensibility and decrease trigger points to improve ease of ambulation. With   [] TE   [] TA   [] neuro   [] other: Patient Education: [x] Review HEP    [] Progressed/Changed HEP based on:   [] positioning   [] body mechanics   [] transfers   [] heat/ice application    [] other:      Pain Level (0-10 scale) post treatment: ankle 4, hip 3    ASSESSMENT/Changes in Function: Pt performs all exercises as directed, tolerating progressions in resistance and new exercises without increase in pain. Reviewed TFL stretch for accuracy of performance. Began progressing heel raise exercises into eccentrics using a foam roll to produce greater challenge and place brief stretch on the Achilles. Single leg heel raises went well without pain increase.  Significantly more tissue restrictions noted with the medial gastroc head when performing IASTM. Will continue to progress per toleration. Instructed pt not to ice the gastroc or achilles tendon tonight or tomorrow to allow the tissue to go through it's inflammatory response to the instrument assisted massage and promote healing. Patient will continue to benefit from skilled PT services to modify and progress therapeutic interventions, address functional mobility deficits, address ROM deficits, address strength deficits, analyze and address soft tissue restrictions, analyze and cue movement patterns, analyze and modify body mechanics/ergonomics, assess and modify postural abnormalities and instruct in home and community integration to attain remaining goals. [x]  See Plan of Care  []  See progress note/recertification  []  See Discharge Summary         Progress towards goals / Updated goals:  Short term goals to be completed in 2 weeks  1. Pt will be compliant and I with HEP to help reduce pain and improve abilities to complete ADLs.             EVAL: written hardcopy given and initiated exercises              CURRENT: Met 7/1/20 pt reports understanding and compliance  2. Pt will report no higher than 4/10 pain to improve patient's ability to self care              EVAL: worst 7               CURRENT: 7 7/9     Long term goals to be completed in 4 weeks  1. Pt will demonstrate 68 FOTO score to improve abilities to household chores.              PWTA:  34              CURRENT:  2. Pt will report no higher than 2/10 pain to improve patient's ability to complete complete self care independently.              EVAL: worst 7/10              CURRENT: 7 7/9  3.  Pt will demonstrate at least 4+/5 strength in B LE to improve patient's ability to get out of chair with equal weight distrubution.               EVAL: hip F B 4, ext B 4-, abd B 4-, knee F B 4              CURRENT: progressing, B LE grossly 4/5 (7/16/2020)  4. Pt will demonstrate at least 30 seconds of SLS EO no UE on compliant surface to increase motor and sensory balance strategies to improve strength and decrease risk of falls.              EVAL:  SLS R 19\" L 17\" no UE floor EO              CURRENT:  5. Pt will negotiate stairs of 8 inch height x6 I with no pain to increase ability to get into home safely.              EVAL: DNT              CURRENT: progressing, no pain increase (7/21/2020)    PLAN  [x]  Upgrade activities as tolerated     [x]  Continue plan of care  []  Update interventions per flow sheet       []  Discharge due to:_  []  Other:_      Angelo Roberts, PT 7/21/2020  11:18 AM    Future Appointments   Date Time Provider Meghna Moreno   7/23/2020 11:00 [facility]   7/28/2020 11:15 AM [facility]   7/30/2020 11:00 AM [facility]

## 2020-07-21 NOTE — TELEPHONE ENCOUNTER
[address] notifying patient that she may  order for orthotics at [facility] location.

## 2020-07-23 ENCOUNTER — TELEPHONE (OUTPATIENT)
Dept: ORTHOPEDIC SURGERY | Age: 70
End: 2020-07-23

## 2020-07-23 ENCOUNTER — HOSPITAL ENCOUNTER (OUTPATIENT)
Dept: PHYSICAL THERAPY | Age: 70
Discharge: HOME | End: 2020-07-23
Payer: MEDICARE

## 2020-07-23 DIAGNOSIS — M25.572 ACUTE LEFT ANKLE PAIN: ICD-10-CM

## 2020-07-23 DIAGNOSIS — M76.62 LEFT ACHILLES TENDINITIS: Primary | ICD-10-CM

## 2020-07-23 PROCEDURE — 97112 NEUROMUSCULAR REEDUCATION: CPT

## 2020-07-23 PROCEDURE — 97140 MANUAL THERAPY 1/> REGIONS: CPT

## 2020-07-23 PROCEDURE — 97110 THERAPEUTIC EXERCISES: CPT

## 2020-07-23 NOTE — TELEPHONE ENCOUNTER
Patient stopped at the  to  an order for orthotics but she stated she was told she need to see a pediatrist and she wants to speak with Dr. Trevon Olivas about referring her to see one.  Patient can be reached at [phone]

## 2020-07-23 NOTE — TELEPHONE ENCOUNTER
Referral entered into chart. I explained to patient that we usually [facility] for custom orthotics.   Patient states that PT told her to go to podiatrist.

## 2020-07-23 NOTE — PROGRESS NOTES
PT DAILY TREATMENT NOTE 10    Patient Name: Daniele Sampson  Date:2020  : 1950  [x]  Patient  Verified  Payor: [place] MEDICARE / Plan: [place] MEDICARE PART A & B / Product Type: Medicare /    In GBKS:[id number]  Out time:1157  Total Treatment Time (min): 59  Visit #: 7 of 8    Medicare/BCBS Only   Total Timed Codes (min):  59 1:1 Treatment Time:  47       Treatment Area: Left ankle pain [M25.572]    SUBJECTIVE  Pain Level (0-10 scale): ankle 5, hip 3  Any medication changes, allergies to medications, adverse drug reactions, diagnosis change, or new procedure performed?: [x] No    [] Yes (see summary sheet for update)  Subjective functional status/changes:   [] No changes reported  Pt reports some increase in ankle pain from the last session with pain on the bottom of her foot this morning before she stretched. OBJECTIVE    Modality rationale: decrease inflammation and decrease pain to improve the patients ability to perform pain free ADLs. Min Type Additional Details   10 [x]  Vasopneumatic Device Pressure:       [x] lo [] med [] hi   Temperature: [x] lo [] med [] hi   [] Skin assessment post-treatment:  []intact []redness- no adverse reaction    []redness  adverse reaction:     31 min Therapeutic Exercise:  [x] See flow sheet :   Rationale: increase ROM and increase strength to improve the patients ability to perform ADLs. 8 min Neuromuscular Re-education:  [x]  See flow sheet :   Rationale: increase strength, improve balance and increase proprioception  to improve the patients ability to improve stability with ambulation. 8 min Manual Therapy:  Long sitting -- talocrural and subtalar GR2-3 oscillations in all directions to improve ankle mobility   Rationale: decrease pain, increase ROM and increase tissue extensibility to improve ease of ADLs.            With   [] TE   [] TA   [] neuro   [] other: Patient Education: [x] Review HEP    [] Progressed/Changed HEP based on:   [] positioning   [] body mechanics   [] transfers   [] heat/ice application    [] other:      Other Objective/Functional Measures: see re-cert     Pain Level (0-10 scale) post treatment: 2 in ankle and hip    ASSESSMENT/Changes in Function: Pt has been making progress towards goals since her SOC, progressing towards strength and pain goals. Currently, pt experiences inferior [place]'s tendon pain and occasional plantar fasciitis heel pains in the morning. Pt has developed a left hip bursitis in the last two weeks, likely due to ankle and hip compensations from the tendonitis. Pt was instructed in hip stretches and to ice her hip for pain relief which has helped. Ankle exercises and balance activities have slowly progressed since her SOC, imposing greater load on the achilles tendon tissue with good results. Pt recently received a referral for B foot orthotic fabrications, which is likely to greatly help her ankle pain and reduce symptoms caused by her pes planus. She continues to wear B heel cups in her shoes due to ankle pain. Patient will continue to benefit from skilled PT services to modify and progress therapeutic interventions, address functional mobility deficits, address ROM deficits, address strength deficits, analyze and address soft tissue restrictions, analyze and cue movement patterns, analyze and modify body mechanics/ergonomics, assess and modify postural abnormalities, address imbalance/dizziness and instruct in home and community integration to attain remaining goals. [x]  See Plan of Care  []  See progress note/recertification  []  See Discharge Summary         Progress towards goals / Updated goals:  Short term goals to be completed in 2 weeks  1. Pt will be compliant and I with HEP to help reduce pain and improve abilities to complete ADLs.               EVAL: written hardcopy given and initiated exercises              CURRENT: Met, pt reports understanding and compliance  2. Pt will report no higher than 4/10 pain to improve patient's ability to self care              EVAL: worst 7               CURRENT: progressing, 5/10 (7/23/2020)  Long term goals to be completed in 4 weeks  1. Pt will demonstrate 68 FOTO score to improve abilities to household chores.              YEES:  63              [id number]: progressing, 57 (7/23/2020)  2. Pt will report no higher than 2/10 pain to improve patient's ability to complete complete self care independently.               EVAL: worst 7/10              CURRENT: progressing, 5/10 (7/23/2020)  3.  Pt will demonstrate at least 4+/5 strength in B LE to improve patient's ability to get out of chair with equal weight distrubution.               EVAL: hip F B 4, ext B 4-, abd B 4-, knee F B 4              CURRENT: progressing, B LE grossly 4/5 (7/16/2020)  4. Pt will demonstrate at least 30 seconds of SLS EO no UE on compliant surface to increase motor and sensory balance strategies to improve strength and decrease risk of falls.              EVAL:  SLS R 19\" L 17\" no UE floor EO              CURRENT: progressing, left SLS 15 sec EO airex (7/23/2020)  5. Pt will negotiate stairs of 8 inch height x6 I with no pain to increase ability to get into home safely.              EVAL: DNT              CURRENT: progressing, no pain increase (7/21/2020)    PLAN  [x]  Upgrade activities as tolerated     [x]  Continue plan of care  []  Update interventions per flow sheet       []  Discharge due to:_  []  Other:_      Jacky Oliveira, PT 7/23/2020  11:14 AM    Future Appointments   Date Time Provider Meghna Moreno   7[facility]28[facility]2020 11:15 [facility]   7/30/2020 11:00 [facility]

## 2020-07-23 NOTE — PROGRESS NOTES
[facility]  [address]  [place], [address]  [phone] fax    Continued Plan of Care/ Re-certification for Physical Therapy Services    Patient name: Alma Barrios Start of Care: 2020   Referral source: Curly Dykes MD : 1950   Medical/Treatment Diagnosis: Left ankle pain [M25.572]  Payor: VA MEDICARE / Plan: VA MEDICARE PART A & B / Product Type: Medicare /  Onset Date:May 2020     Prior Hospitalization: see medical history Provider#: [id number]   Medications: Verified on Patient Summary List    Comorbidities: arthritis, hysterectomy, anal fissure, colonoscopy, endoscopy, hx of LBP, hx of B hip pain   Prior Level of Function: I with ADLs, gym, walking/jogging, household chores, driving, retired, R hand dominate  Visits from [place] of Care: 7    Missed Visits: 0    The Plan of Care and following information is based on the patient's current status:  Short term goals to be completed in 2 weeks  1. Pt will be compliant and I with HEP to help reduce pain and improve abilities to complete ADLs.             EVAL: written hardcopy given and initiated exercises              CURRENT: Met, pt reports understanding and compliance  2. Pt will report no higher than 4/10 pain to improve patient's ability to self care              EVAL: worst 7               CURRENT: progressing, 5/10   Long term goals to be completed in 4 weeks  1. Pt will demonstrate 68 FOTO score to improve abilities to household chores.              APGL:  17              CURRENT: progressing, 57   2. Pt will report no higher than 2/10 pain to improve patient's ability to complete complete self care independently.               EVAL: worst 7/10              CURRENT: progressing, 5/10  3.  Pt will demonstrate at least 4+/5 strength in B LE to improve patient's ability to get out of chair with equal weight distrubution.               EVAL: hip F B 4, ext B 4-, abd B 4-, knee F B 4              CURRENT: progressing, B LE grossly 4/5   4. Pt will demonstrate at least 30 seconds of SLS EO no UE on compliant surface to increase motor and sensory balance strategies to improve strength and decrease risk of falls.              EVAL:  SLS R 19\" L 17\" no UE floor EO              CURRENT: progressing, left SLS 15 sec EO airex   5. Pt will negotiate stairs of 8 inch height x6 I with no pain to increase ability to get into home safely.              EVAL: DNT              CURRENT:met, no pain increase   Key functional changes: improved strength and pain      Problems/ barriers to goal attainment: pain, B pes planus     Problem List: pain affecting function, decrease ROM, decrease strength, impaired gait/ balance, decrease ADL/ functional abilitiies, decrease activity tolerance and decrease flexibility/ joint mobility    Treatment Plan: Therapeutic exercise, Therapeutic activities, Neuromuscular re-education, Physical agent/modality, Gait/balance training, Manual therapy, Patient education, Self Care training, Functional mobility training, Home safety training and Stair training     Patient Goal (s) has been updated and includes: no pain     Goals for this certification period to be accomplished in 4 weeks:  1. Pt will demonstrate 68 FOTO score to improve abilities to household chores.  [facility]: progressing, 57   2. Pt will report no higher than 2/10 pain to improve patient's ability to complete complete self care independently.               CURRENT: progressing, 5/10  3.  Pt will demonstrate at least 4+/5 strength in B LE to improve patient's ability to get out of chair with equal weight distrubution.               CURRENT: progressing, B LE grossly 4/5   4. Pt will demonstrate at least 30 seconds of SLS EO no UE on compliant surface to increase motor and sensory balance strategies to improve strength and decrease risk of falls.              CURRENT: progressing, left SLS 15 sec EO airex Frequency / Duration: Patient to be seen 2 times per week for 4 weeks:    Assessment / Recommendations:Pt has been making progress towards goals since her SOC, progressing towards strength and pain goals. Currently, pt experiences inferior [place]'s tendon pain and occasional plantar fasciitis heel pains in the morning. Pt has developed a left hip bursitis in the last two weeks, likely due to ankle and hip compensations from the tendonitis. Pt was instructed in hip stretches and to ice her hip for pain relief which has helped. Ankle exercises and balance activities have slowly progressed since her SOC, imposing greater load on the achilles tendon tissue with good results. Pt recently received a referral for B foot orthotic fabrications, which is likely to greatly help her ankle pain and reduce symptoms caused by her pes planus. She continues to wear B heel cups in her shoes due to ankle pain. Patient will continue to benefit from skilled PT services to modify and progress therapeutic interventions, address functional mobility deficits, address ROM deficits, address strength deficits, analyze and address soft tissue restrictions, analyze and cue movement patterns, analyze and modify body mechanics/ergonomics, assess and modify postural abnormalities, address imbalance/dizziness and instruct in home and community integration to attain remaining goals. Certification Period: 7/27/2020 -- 8/25/2020    Kerri Rubeneric, PT 7/23/2020 12:51 PM    ________________________________________________________________________  I certify that the above Therapy Services are being furnished while the patient is under my care. I agree with the treatment plan and certify that this therapy is necessary. [] I have read the above and request that my patient continue as recommended.   [] I have read the above report and request that my patient continue therapy with the following changes/special instructions: _____________________________________________  [] I have read the above report and request that my patient be discharged from therapy    Physician's Signature:____________Date:_________TIME:________    ** Signature, Date and Time must be completed for valid certification **    Please sign and return to [facility] [address]  [address]  [place], [address]  [phone] fax

## 2020-07-28 ENCOUNTER — HOSPITAL ENCOUNTER (OUTPATIENT)
Dept: PHYSICAL THERAPY | Age: 70
Discharge: HOME | End: 2020-07-28
Payer: MEDICARE

## 2020-07-28 PROCEDURE — 97140 MANUAL THERAPY 1/> REGIONS: CPT

## 2020-07-28 PROCEDURE — 97112 NEUROMUSCULAR REEDUCATION: CPT

## 2020-07-28 PROCEDURE — 97110 THERAPEUTIC EXERCISES: CPT

## 2020-07-28 NOTE — PROGRESS NOTES
PT DAILY TREATMENT NOTE 10-18    Patient Name: Kandice Maradiaga  Date:2020  : 1950  [x]  Patient  Verified  Payor: VA MEDICARE / Plan: VA MEDICARE PART A & B / Product Type: Medicare /    In time:1115  Out time:1203  Total Treatment Time (min): 48  Visit #: 1 of 8    Medicare/BCBS Only   Total Timed Codes (min):  48 1:1 Treatment Time:  48       Treatment Area: Left ankle pain [M25.572]    SUBJECTIVE  Pain Level (0-10 scale): 0  Any medication changes, allergies to medications, adverse drug reactions, diagnosis change, or new procedure performed?: [x] No    [] Yes (see summary sheet for update)  Subjective functional status/changes:   [] No changes reported  Pt reports she is not feeling any hip or ankle pain today. OBJECTIVE    30 min Therapeutic Exercise:  [x] See flow sheet :   Rationale: increase ROM and increase strength to improve the patients ability to perform ADLs with reduced pain. 10 min Neuromuscular Re-education:  [x]  See flow sheet :   Rationale: increase strength, improve balance and increase proprioception  to improve the patients ability to improve ankle stability for ADLs. 8 min Manual Therapy:  IASTM to the left gastroc with emphasis on the medial gastroc head and along the achilles tendon followed by DTM to the B gastroc heads. Rationale: decrease pain, increase ROM and increase tissue extensibility to improve ease of ambulation. With   [x] TE   [] TA   [] neuro   [] other: Patient Education: [x] Review HEP    [] Progressed/Changed HEP based on:   [] positioning   [] body mechanics   [] transfers   [] heat/ice application    [] other:      Other Objective/Functional Measures: left SLS airex 18 sec, right 30 sec     Pain Level (0-10 scale) post treatment: 0    ASSESSMENT/Changes in Function: pt performs exercises as directed, and is challenged by single leg stance exercises as well as 1/2 foam roll with eccentric lowering secondary to reduced left LE balance. Pt has good toleration of exercises today, having no increased pain. Educated pt to avoid icing the left calf and ankle to allow the inflammatory process to assist with tissue healing following IASTM for tonight and tomorrow. Patient will continue to benefit from skilled PT services to modify and progress therapeutic interventions, address functional mobility deficits, address ROM deficits, address strength deficits, analyze and address soft tissue restrictions, analyze and cue movement patterns, analyze and modify body mechanics/ergonomics, assess and modify postural abnormalities, address imbalance/dizziness and instruct in home and community integration to attain remaining goals. [x]  See Plan of Care  [x]  See progress note/recertification  []  See Discharge Summary         Progress towards goals / Updated goals:  1. Pt will demonstrate 68 FOTO score to improve abilities to household chores.              PN: progressing, 57   2. Pt will report no higher than 2/10 pain to improve patient's ability to complete complete self care independently.               PN: progressing, 5/10  3. Pt will demonstrate at least 4+/5 strength in B LE to improve patient's ability to get out of chair with equal weight distrubution.               PN: progressing, B LE grossly 4/5   4. Pt will demonstrate at least 30 seconds of SLS EO no UE on compliant surface to increase motor and sensory balance strategies to improve strength and decrease risk of falls.    PN: progressing, left SLS 15 sec EO airex    Current: progressing, right SLS airex EO 30 sec, left 18 sec airex EO (7/28/2020)    PLAN  []  Upgrade activities as tolerated     [x]  Continue plan of care  []  Update interventions per flow sheet       []  Discharge due to:_  []  Other:_      Alison Limon, PT 7/28/2020  11:21 AM    Future Appointments   Date Time Provider Meghna Moreno   7/30/2020 11:00 AM [facility]

## 2020-07-30 ENCOUNTER — HOSPITAL ENCOUNTER (OUTPATIENT)
Dept: PHYSICAL THERAPY | Age: 70
Discharge: HOME | End: 2020-07-30
Payer: MEDICARE

## 2020-07-30 PROCEDURE — 97140 MANUAL THERAPY 1/> REGIONS: CPT

## 2020-07-30 PROCEDURE — 97110 THERAPEUTIC EXERCISES: CPT

## 2020-07-30 PROCEDURE — 97112 NEUROMUSCULAR REEDUCATION: CPT

## 2020-08-04 ENCOUNTER — APPOINTMENT (OUTPATIENT)
Dept: PHYSICAL THERAPY | Age: 70
End: 2020-08-04
Payer: MEDICARE

## 2020-08-06 ENCOUNTER — HOSPITAL ENCOUNTER (OUTPATIENT)
Dept: PHYSICAL THERAPY | Age: 70
Discharge: HOME | End: 2020-08-06
Payer: MEDICARE

## 2020-08-06 PROCEDURE — 97112 NEUROMUSCULAR REEDUCATION: CPT

## 2020-08-06 PROCEDURE — 97110 THERAPEUTIC EXERCISES: CPT

## 2020-08-06 PROCEDURE — 97140 MANUAL THERAPY 1/> REGIONS: CPT

## 2020-08-06 NOTE — PROGRESS NOTES
PT DAILY TREATMENT NOTE 10-18    Patient Name: Alfonso Lara  Date:2020  : 1950  [x]  Patient  Verified  Payor: VA MEDICARE / Plan: VA MEDICARE PART A & B / Product Type: Medicare /    In time:2:30  Out time:3:29  Total Treatment Time (min): 61  Visit #: 3 of 8    Medicare/BCBS Only   Total Timed Codes (min):  49 1:1 Treatment Time:  49       Treatment Area: Left ankle pain [M25.572]    SUBJECTIVE  Pain Level (0-10 scale): ankle-2  Any medication changes, allergies to medications, adverse drug reactions, diagnosis change, or new procedure performed?: [x] No    [] Yes (see summary sheet for update)  Subjective functional status/changes:   [] No changes reported  Pt reports she walked a half a mile today instead of a whole mile and she had no pain or difficulty afterwards. OBJECTIVE    Modality rationale: decrease inflammation and decrease pain to improve the patients ability to tolerate ADL's.    Min Type Additional Details    [] Estim:  []Unatt       []IFC  []Premod                        []Other:  []w/ice   []w/heat  Position:  Location:    [] Estim: []Att    []TENS instruct  []NMES                    []Other:  []w/US   []w/ice   []w/heat  Position:  Location:    []  Traction: [] Cervical       []Lumbar                       [] Prone          []Supine                       []Intermittent   []Continuous Lbs:  [] before manual  [] after manual    []  Ultrasound: []Continuous   [] Pulsed                           []1MHz   []3MHz W/cm2:  Location:    []  Iontophoresis with dexamethasone         Location: [] Take home patch   [] In clinic    []  Ice     []  heat  []  Ice massage  []  Laser   []  Anodyne Position:  Location:    []  Laser with stim  []  Other:  Position:  Location:   10 [x]  Vasopneumatic Device Pressure:       [x] lo [] med [] hi   Temperature: [x] lo [] med [] hi   [x] Skin assessment post-treatment:  [x]intact []redness- no adverse reaction    []redness  adverse reaction:       26 min Therapeutic Exercise:  [x] See flow sheet :   Rationale: increase ROM and increase strength to improve the patients ability to preform daily task with ease. 15 min Neuromuscular Re-education:  [x]  See flow sheet :   Rationale: increase strength, improve coordination, improve balance and increase proprioception  to improve the patients ability to perform daily task with ease. 8 min Manual Therapy:  Manual stretching (flex/Ext/IV/EV/great toe) to left ankle in long sitting. TCJ mobs grades II-III. STM to achilles and under the lateral malleoli   Rationale: decrease pain, increase ROM and increase tissue extensibility to improve functional mobility with ADL's. With   [] TE   [] TA   [] neuro   [] other: Patient Education: [x] Review HEP    [] Progressed/Changed HEP based on:   [] positioning   [] body mechanics   [] transfers   [] heat/ice application    [] other:      Other Objective/Functional Measures:      Pain Level (0-10 scale) post treatment: 3    ASSESSMENT/Changes in Function:   Pt had no complaints of increased pain with therex and displays improved functional mobility of the left ankle. Pt reports some continued difficulty with prolonged standing due to increased pain but notes a significant decrease in pain since [facility]. Continued treatment to improve left ankle strength and aid with ease of ambulation ADL's. Patient will continue to benefit from skilled PT services to modify and progress therapeutic interventions, address functional mobility deficits, address ROM deficits, address strength deficits, analyze and address soft tissue restrictions, analyze and cue movement patterns, analyze and modify body mechanics/ergonomics and assess and modify postural abnormalities to attain remaining goals.      [x]  See Plan of Care  []  See progress note/recertification  []  See Discharge Summary         Progress towards goals / Updated goals:  1. Pt will demonstrate 68 FOTO score to improve abilities to household chores.              PN: progressing, 62   2. Pt will report no higher than 2/10 pain to improve patient's ability to complete complete self care independently.               PN: progressing, 5/10  3. Pt will demonstrate at least 4+/5 strength in B LE to improve patient's ability to get out of chair with equal weight distrubution.               PN: progressing, B LE grossly 4/5   4. Pt will demonstrate at least 30 seconds of SLS EO no UE on compliant surface to increase motor and sensory balance strategies to improve strength and decrease risk of falls.              PN: progressing, left SLS 15 sec EO airex               Current: progressing, right SLS airex EO 30 sec, left 18 sec airex EO (7/28/2020)       PLAN  []  Upgrade activities as tolerated     [x]  Continue plan of care  []  Update interventions per flow sheet       []  Discharge due to:_  []  Other:_      Louie Sims PTA 8/6/2020  2:37 PM    Future Appointments   Date Time Provider Meghna Moreno   8/7/2020  7:00 AM [facility]   8/11/2020 11:15 AM Bora Barnes PTA [facility]   8/13/2020 11:30 AM Vamsisánchez Rodas, PT [facility]   8/18/2020 11:15 AM Vamsisánchez Rodas, PT [facility]   8/20/2020 11:30 AM Vamsisánchez Rdoas, PT [facility]

## 2020-08-07 ENCOUNTER — HOSPITAL ENCOUNTER (OUTPATIENT)
Dept: ULTRASOUND IMAGING | Age: 70
Discharge: HOME | End: 2020-08-07
Attending: PHYSICIAN ASSISTANT
Payer: MEDICARE

## 2020-08-07 DIAGNOSIS — R14.2 FLATULENCE, ERUCTATION, AND GAS PAIN: ICD-10-CM

## 2020-08-07 DIAGNOSIS — R10.13 ABDOMINAL PAIN, EPIGASTRIC: ICD-10-CM

## 2020-08-07 DIAGNOSIS — R19.4 FREQUENT BOWEL MOVEMENTS: ICD-10-CM

## 2020-08-07 DIAGNOSIS — R11.0 NAUSEA: ICD-10-CM

## 2020-08-07 DIAGNOSIS — R07.89 OTHER CHEST PAIN: ICD-10-CM

## 2020-08-07 DIAGNOSIS — K21.9 ESOPHAGEAL REFLUX: ICD-10-CM

## 2020-08-07 DIAGNOSIS — R10.11 ABDOMINAL PAIN, RIGHT UPPER QUADRANT: ICD-10-CM

## 2020-08-07 DIAGNOSIS — R14.1 FLATULENCE, ERUCTATION, AND GAS PAIN: ICD-10-CM

## 2020-08-07 DIAGNOSIS — R14.3 FLATULENCE, ERUCTATION, AND GAS PAIN: ICD-10-CM

## 2020-08-07 DIAGNOSIS — K62.89 ANAL OR RECTAL PAIN: ICD-10-CM

## 2020-08-07 PROCEDURE — 76705 ECHO EXAM OF ABDOMEN: CPT

## 2020-08-11 ENCOUNTER — HOSPITAL ENCOUNTER (OUTPATIENT)
Dept: PHYSICAL THERAPY | Age: 70
Discharge: HOME | End: 2020-08-11
Payer: MEDICARE

## 2020-08-11 PROCEDURE — 97112 NEUROMUSCULAR REEDUCATION: CPT

## 2020-08-11 PROCEDURE — 97110 THERAPEUTIC EXERCISES: CPT

## 2020-08-11 PROCEDURE — 97140 MANUAL THERAPY 1/> REGIONS: CPT

## 2020-08-11 NOTE — PROGRESS NOTES
PT DAILY TREATMENT NOTE 10-18    Patient Name: Evelyn Heller  Date:2020  : 1950  [x]  Patient  Verified  Payor: [place] MEDICARE / Plan: [place] MEDICARE PART A & B / Product Type: Medicare /    In time:11:15  Out time:12:12  Total Treatment Time (min): 62  Visit #: 4 of 8    Medicare/BCBS Only   Total Timed Codes (min):  47 1:1 Treatment Time:  40       Treatment Area: Left ankle pain [M25.572]    SUBJECTIVE  Pain Level (0-10 scale): 3  Any medication changes, allergies to medications, adverse drug reactions, diagnosis change, or new procedure performed?: [x] No    [] Yes (see summary sheet for update)  Subjective functional status/changes:   [] No changes reported  Pt reports she has her podiatrist appointment on Thursday for her orthotics. OBJECTIVE    Modality rationale: decrease inflammation and decrease pain to improve the patients ability to perform functional task with ease.    Min Type Additional Details    [] Estim:  []Unatt       []IFC  []Premod                        []Other:  []w/ice   []w/heat  Position:  Location:    [] Estim: []Att    []TENS instruct  []NMES                    []Other:  []w/US   []w/ice   []w/heat  Position:  Location:    []  Traction: [] Cervical       []Lumbar                       [] Prone          []Supine                       []Intermittent   []Continuous Lbs:  [] before manual  [] after manual    []  Ultrasound: []Continuous   [] Pulsed                           []1MHz   []3MHz W/cm2:  Location:    []  Iontophoresis with dexamethasone         Location: [] Take home patch   [] In clinic    []  Ice     []  heat  []  Ice massage  []  Laser   []  Anodyne Position:  Location:    []  Laser with stim  []  Other:  Position:  Location:   10 [x]  Vasopneumatic Device Pressure:       [x] lo [] med [] hi   Temperature: [x] lo [] med [] hi   [x] Skin assessment post-treatment:  [x]intact []redness- no adverse reaction    []redness  adverse reaction:       24 min Therapeutic Exercise:  [x] See flow sheet :   Rationale: increase ROM and increase strength to improve the patients ability to perform functional task with ease. 15 min Neuromuscular Re-education:  [x]  See flow sheet :dynamic and static balance   Rationale: increase strength, improve coordination and increase proprioception  to improve the patients ability to perform ADL's with ease. 8 min Manual Therapy:  Manual stretching (flex/Ext/IV/EV/great toe) to left ankle in long sitting. TCJ mobs grades II-III. STM to left achilles and under the lateral malleoli   Rationale: decrease pain, increase ROM and increase tissue extensibility to improve functional mobility with ADL's. With   [] TE   [] TA   [] neuro   [] other: Patient Education: [x] Review HEP    [] Progressed/Changed HEP based on:   [] positioning   [] body mechanics   [] transfers   [] heat/ice application    [] other:      Other Objective/Functional Measures:      Pain Level (0-10 scale) post treatment: ankle-1, Heel-3     ASSESSMENT/Changes in Function:   Pt reports improved ease with therex this visit and notes no increased pain in the left ankle or hip with progressed therex. Pt reports some continued discomfort in the left ankle around the lateral malleoli, through the heel to the medial malleoli that is mainly where most of her pain resides but notes her pain at worst is about a 4/10 and at best 1/10. Patient will continue to benefit from skilled PT services to modify and progress therapeutic interventions, address functional mobility deficits, address ROM deficits, address strength deficits, analyze and address soft tissue restrictions, analyze and cue movement patterns, analyze and modify body mechanics/ergonomics and assess and modify postural abnormalities to attain remaining goals.      [x]  See Plan of Care  []  See progress note/recertification  []  See Discharge Summary         Progress towards goals / Updated goals:   1. Pt will demonstrate 68 FOTO score to improve abilities to household chores.              PN: progressing, 57   2. Pt will report no higher than 2/10 pain to improve patient's ability to complete complete self care independently.               PN: progressing, 5/10   Current: Progressing 8/11/20 4/10 worst, 1/10 best  3. Pt will demonstrate at least 4+/5 strength in B LE to improve patient's ability to get out of chair with equal weight distrubution.               PN: progressing, B LE grossly 4/5   4. Pt will demonstrate at least 30 seconds of SLS EO no UE on compliant surface to increase motor and sensory balance strategies to improve strength and decrease risk of falls.              PN: progressing, left SLS 15 sec EO airex               Current: progressing, right SLS airex EO 30 sec, left 18 sec airex EO (7/28/2020)    PLAN  []  Upgrade activities as tolerated     [x]  Continue plan of care  []  Update interventions per flow sheet       []  Discharge due to:_  []  Other:_      Gibran Tidwell PTA 8/11/2020  10:22 AM    Future Appointments   Date Time Provider Meghna Moreno   8/11/2020 11:15 [facility]   8/13/2020 11:30 AM [facility]   8/18/2020 11:15 AM [facility]   8/20/2020 11:30 AM Heriberto Joseph, PT [facility]

## 2020-08-13 ENCOUNTER — HOSPITAL ENCOUNTER (OUTPATIENT)
Dept: PHYSICAL THERAPY | Age: 70
Discharge: HOME | End: 2020-08-13
Payer: MEDICARE

## 2020-08-13 PROCEDURE — 97140 MANUAL THERAPY 1/> REGIONS: CPT

## 2020-08-13 PROCEDURE — 97535 SELF CARE MNGMENT TRAINING: CPT

## 2020-08-13 PROCEDURE — 97110 THERAPEUTIC EXERCISES: CPT

## 2020-08-13 PROCEDURE — 97112 NEUROMUSCULAR REEDUCATION: CPT

## 2020-08-13 NOTE — PROGRESS NOTES
PT DAILY TREATMENT NOTE 10-18    Patient Name: Yo Paniagua  Date:2020  : 1950  [x]  Patient  Verified  Payor: [place] MEDICARE / Plan: [place] MEDICARE PART A & B / Product Type: Medicare /    In time:1130  Out time:1233  Total Treatment Time (min): 63  Visit #: 5 of 8    Medicare/BCBS Only   Total Timed Codes (min):  63 1:1 Treatment Time:  53       Treatment Area: Left ankle pain [M25.572]    SUBJECTIVE  Pain Level (0-10 scale): ankle 3, hip 2  Any medication changes, allergies to medications, adverse drug reactions, diagnosis change, or new procedure performed?: [x] No    [] Yes (see summary sheet for update)  Subjective functional status/changes:   [] No changes reported  Pt reports biking two miles and needing to ice the ankle following -- has not ridden in 15 years. Reports not a lot of pain following but \"just a little bit. \" Saw podiatrist today and got fitted for orthotic, which is not covered by insurance    OBJECTIVE    Modality rationale: decrease inflammation and decrease pain to improve the patients ability to walk with decreased pain   Min Type Additional Details   10 [x]  Ice     []  heat  []  Ice massage  []  Laser   []  Anodyne Position: long sit  Location: left hip   [] Skin assessment post-treatment:  []intact []redness- no adverse reaction    []redness  adverse reaction:     27 min Therapeutic Exercise:  [x] See flow sheet : exercises initiated per flowsheet   Rationale: increase ROM and increase strength to improve the patients ability to perform pain free ADLs. 10 min Neuromuscular Re-education:  [x]  See flow sheet : per flowsheet   Rationale: increase strength, improve coordination, improve balance and increase proprioception  to improve the patients ability to ambulate with improved ankle stability.     8 min Manual Therapy:  IASTM to left gastroc and achilles tendon   Rationale: decrease pain, increase ROM, increase tissue extensibility and decrease trigger points to ambulate with reduced pain. 8 min Self Care/Home Management:  Education to utilize ice for only 10-15 minutes at a time to avoid cell death. Discussion of current evidence for heel raises and importance of not overdoing exercise but that these exercises are good for healing tissue as it slowly rebuilds   Rationale: decrease pain and improve return to recreational activities and comfort with self care. With   [x] TE   [] TA   [] neuro   [] other: Patient Education: [x] Review HEP    [] Progressed/Changed HEP based on:   [] positioning   [] body mechanics   [] transfers   [] heat/ice application    [] other:      Other Objective/Functional Measures: left ankle DF 15 degrees     Pain Level (0-10 scale) post treatment: 2 ankle    ASSESSMENT/Changes in Function: pt performs all exercises as directed, tolerating exercise progressions without c/o. Pt making good functional progress towards goals and will continue to benefit from progressive stretching and eccentric exercises. Patient will continue to benefit from skilled PT services to modify and progress therapeutic interventions, address functional mobility deficits, address ROM deficits, address strength deficits, analyze and address soft tissue restrictions, analyze and cue movement patterns, analyze and modify body mechanics/ergonomics, assess and modify postural abnormalities, address imbalance/dizziness and instruct in home and community integration to attain remaining goals.      [x]  See Plan of Care  []  See progress note/recertification  []  See Discharge Summary         Progress towards goals / Updated goals:  1. Pt will demonstrate 68 FOTO score to improve abilities to household chores.              PN: progressing, 57   2. Pt will report no higher than 2/10 pain to improve patient's ability to complete complete self care independently.               PN: progressing, 5/10              Current: Progressing 8/11/20 4/10 worst, 1/10 best  3. Pt will demonstrate at least 4+/5 strength in B LE to improve patient's ability to get out of chair with equal weight distrubution.               PN: progressing, B LE grossly 4/5   4. Pt will demonstrate at least 30 seconds of SLS EO no UE on compliant surface to increase motor and sensory balance strategies to improve strength and decrease risk of falls.              PN: progressing, left SLS 15 sec EO airex               Current: progressing, right SLS airex EO 30 sec, left 18 sec airex EO (7/28/2020)    PLAN  [x]  Upgrade activities as tolerated     []  Continue plan of care  []  Update interventions per flow sheet       []  Discharge due to:_  []  Other:_      Julio César Davis, PT 8/13/2020  11:36 AM    Future Appointments   Date Time Provider Meghna Moreno   8/18/2020 11:15 [facility]   8/20/2020 11:30 AM [facility]

## 2020-08-18 ENCOUNTER — HOSPITAL ENCOUNTER (OUTPATIENT)
Dept: PHYSICAL THERAPY | Age: 70
Discharge: HOME | End: 2020-08-18
Payer: MEDICARE

## 2020-08-18 PROCEDURE — 97140 MANUAL THERAPY 1/> REGIONS: CPT

## 2020-08-18 PROCEDURE — 97112 NEUROMUSCULAR REEDUCATION: CPT

## 2020-08-18 PROCEDURE — 97110 THERAPEUTIC EXERCISES: CPT

## 2020-08-18 NOTE — PROGRESS NOTES
PT DAILY TREATMENT NOTE 10-18    Patient Name: Jayna Esteves  Date:2020  : 1950  [x]  Patient  Verified  Payor: [place] MEDICARE / Plan: VA MEDICARE PART A & B / Product Type: Medicare /    In time:1117  Out time:1212  Total Treatment Time (min): 55  Visit #: 6 of 8    Medicare/BCBS Only   Total Timed Codes (min):  55 1:1 Treatment Time:  45       Treatment Area: Left ankle pain [M25.572]    SUBJECTIVE  Pain Level (0-10 scale): 4 ankle,  2 hip  Any medication changes, allergies to medications, adverse drug reactions, diagnosis change, or new procedure performed?: [x] No    [] Yes (see summary sheet for update)  Subjective functional status/changes:   [] No changes reported  Reports went for a bike ride a couple days ago and ankle pain has been 4/10 since this despite icing and stretching. OBJECTIVE    Modality rationale: decrease inflammation and decrease pain to improve the patients ability to perform pain free ambulation. Min Type Additional Details   10  [x]  Ice     []  heat  []  Ice massage  []  Laser   []  Anodyne Position: reclined long sit  Location: left hip   10 [x]  Vasopneumatic Device -- left ankle Pressure:       [x] lo [] med [] hi   Temperature: [x] lo [] med [] hi   [] Skin assessment post-treatment:  []intact []redness- no adverse reaction    []redness  adverse reaction:     22 min Therapeutic Exercise:  [x] See flow sheet : exercises initiated per flowsheet   Rationale: increase ROM and increase strength to improve the patients ability to perform pain free ADLs. 15 min Neuromuscular Re-education:  [x]  See flow sheet : initiated per flowsheet   Rationale: increase ROM and increase strength  to improve the patients ability to stabilize ankle with dynamic activities and reduce pain.      8 min Manual Therapy:  Long sitting -- talocrural ankle mobilizations GR3 to improve DF/PF, gastroc/soleus DTM   Rationale: decrease pain, increase ROM, increase tissue extensibility and decrease trigger points to improve ease of ADLs. With   [x] TE   [] TA   [] neuro   [] other: Patient Education: [x] Review HEP    [] Progressed/Changed HEP based on:   [] positioning   [] body mechanics   [] transfers   [] heat/ice application    [] other:      Other Objective/Functional Measures: 30 sec B SLS     Pain Level (0-10 scale) post treatment: ankle 3, hip 1    ASSESSMENT/Changes in Function: Pt performs exercises as directed without pain increase, stating exercises and movement are not painful and pain occurs more following prolonged sitting. Pt will continue to benefit from progressive eccentric strengthening and soft tissue/ankle mobility. Patient will continue to benefit from skilled PT services to modify and progress therapeutic interventions, address functional mobility deficits, address ROM deficits, address strength deficits, analyze and address soft tissue restrictions, analyze and cue movement patterns, analyze and modify body mechanics/ergonomics, assess and modify postural abnormalities, address imbalance/dizziness and instruct in home and community integration to attain remaining goals.      [x]  See Plan of Care  []  See progress note/recertification  []  See Discharge Summary         Progress towards goals / Updated goals:  1. Pt will demonstrate 68 FOTO score to improve abilities to household chores.              PN: progressing, 57   2. Pt will report no higher than 2/10 pain to improve patient's ability to complete complete self care independently.               PN: progressing, 5/10              Current: Progressing 8/11/20 4/10 worst, 1/10 best  3. Pt will demonstrate at least 4+/5 strength in B LE to improve patient's ability to get out of chair with equal weight distrubution.               PN: progressing, B LE grossly 4/5   4. Pt will demonstrate at least 30 seconds of SLS EO no UE on compliant surface to increase motor and sensory balance strategies to improve strength and decrease risk of falls.              PN: progressing, left SLS 15 sec EO airex               OKACKVZ: progressing, right SLS airex EO 30 sec, left 18 sec airex EO (7/28/2020)    PLAN  []  Upgrade activities as tolerated     [x]  Continue plan of care  []  Update interventions per flow sheet       []  Discharge due to:_  []  Other:_      Wilmar Griffin, PT 8/18/2020  11:20 AM    Future Appointments   Date Time Provider Meghna Moreno   8/20/2020 11:30 AM Betito John, PT [id number]

## 2020-08-20 ENCOUNTER — HOSPITAL ENCOUNTER (OUTPATIENT)
Dept: PHYSICAL THERAPY | Age: 70
Discharge: HOME | End: 2020-08-20
Payer: MEDICARE

## 2020-08-20 PROCEDURE — 97110 THERAPEUTIC EXERCISES: CPT

## 2020-08-20 PROCEDURE — 97016 VASOPNEUMATIC DEVICE THERAPY: CPT

## 2020-08-20 PROCEDURE — 97112 NEUROMUSCULAR REEDUCATION: CPT

## 2020-08-20 NOTE — PROGRESS NOTES
PT DAILY TREATMENT NOTE 10-18    Patient Name: Tisha Lara  Date:2020  : 1950  [x]  Patient  Verified  Payor: [place] MEDICARE / Plan: VA MEDICARE PART A & B / Product Type: Medicare /    In time:1135  Out time:1230  Total Treatment Time (min): 55  Visit #: 7 of 8    Medicare/BCBS Only   Total Timed Codes (min):  55 1:1 Treatment Time:  55       Treatment Area: Left ankle pain [M25.572]    SUBJECTIVE  Pain Level (0-10 scale): ankle 4, hip 2  Any medication changes, allergies to medications, adverse drug reactions, diagnosis change, or new procedure performed?: [x] No    [] Yes (see summary sheet for update)  Subjective functional status/changes:   [] No changes reported  Reports went bike riding yesterday and has 4/10 ankle pain today. OBJECTIVE    Modality rationale: decrease inflammation and decrease pain to improve the patients ability to perform ADLs. Min Type Additional Details   10 [x]  Vasopneumatic Device  Left hip cold pack Pressure:       [x] lo [] med [] hi   Temperature: [x] lo [] med [] hi   [] Skin assessment post-treatment:  []intact []redness- no adverse reaction    []redness  adverse reaction:     33 min Therapeutic Exercise:  [x] See flow sheet : exercises initiated per flowsheet   Rationale: increase ROM and increase strength to improve the patients ability to perform ADLs. 10 min Neuromuscular Re-education:  [x]  See flow sheet : SLS and bosu exercises   Rationale: increase strength, improve balance and increase proprioception  to improve the patients ability to stabilize ankle during dynamic activities.            With   [x] TE   [] TA   [] neuro   [] other: Patient Education: [x] Review HEP    [] Progressed/Changed HEP based on:   [] positioning   [] body mechanics   [] transfers   [] heat/ice application    [] other:      Other Objective/Functional Measures: see d/c summary     Pain Level (0-10 scale) post treatment: ankle 1/10, hip 0/10    ASSESSMENT/Changes in Function: Patient has met or nearly met all strength and functional goals. On average at rest, left ankle pain remains between 0-2/10 and pt reports improvement in function and decreased need to utilize heel cups for pain relief. Pt had elevated pain today following bike riding for 2 miles yesterday. She was educated to reduce this activity to 1 mile at a time to allow the achilles tendon to more slowly take on greater challenge followed by ice. Pt provided with an updated HEP to continue to progress towards unmet goals. Patient will continue to benefit from skilled PT services to modify and progress therapeutic interventions, address functional mobility deficits, address ROM deficits, address strength deficits, analyze and address soft tissue restrictions, analyze and cue movement patterns, analyze and modify body mechanics/ergonomics, assess and modify postural abnormalities, address imbalance/dizziness and instruct in home and community integration to attain remaining goals.      [x]  See Plan of Care  []  See progress note/recertification  []  See Discharge Summary         Progress towards goals / Updated goals:  1. Pt will demonstrate 68 FOTO score to improve abilities to household chores.              PN: progressing, 57    Current: met, 72 (8/20/2020)  2. Pt will report no higher than 2/10 pain to improve patient's ability to complete complete self care independently.               PN: progressing, 5/10              Current: Near met/Progressing, 4/10 pain following bike riding, 1/10 at best (8/20/2020)  3. Pt will demonstrate at least 4+/5 strength in B LE to improve patient's ability to get out of chair with equal weight distrubution.               PN: progressing, B LE grossly 4/5    Current: near met/progressing -- 4+/5 gross hip and knee strength, 4/5 gross ankle (8/20/2020)  4. Pt will demonstrate at least 30 seconds of SLS EO no UE on compliant surface to increase motor and sensory balance strategies to improve strength and decrease risk of falls.              PN: progressing, left SLS 15 sec EO airex               Current: met, B SLS airex EO 30 sec (8/20/2020)    PLAN  []  Upgrade activities as tolerated     [x]  Continue plan of care  []  Update interventions per flow sheet       []  Discharge due to:_  []  Other:_      Liu Pettit, PT 8/20/2020  11:40 AM    No future appointments.

## 2020-08-26 NOTE — PROGRESS NOTES
[facility]  [address]  [place], [address]  [phone] fax    Physical Therapy Discharge Summary    Patient name: Ludivina Frederick Start of Care: 2020   Referral source: Darylene Guerin, MD : 1950   Medical/Treatment Diagnosis: Left ankle pain [M25.572]  Payor: [place] MEDICARE / Plan: [address] [place] [address] / Product Type: Medicare /  [place]set Date:May 2020     Prior Hospitalization: see medical history Provider#: [id number]   Medications: Verified on Patient Summary List    Comorbidities: arthritis, hysterectomy, anal fissure, colonoscopy, endoscopy, hx of LBP, hx of B hip pain   Prior Level of Function: I with ADLs, gym, walking/jogging, household chores, driving, retired, R hand dominate  Visits from [place] of Care: 14    Missed Visits: 0  Reporting Period : 2020 to 2020    Summary of Care:  1. Pt will demonstrate 68 FOTO score to improve abilities to household chores.              PN: progressing, 57               Current: met, 72   2. Pt will report no higher than 2/10 pain to improve patient's ability to complete complete self care independently.               PN: progressing, 5/10              Current: Near met/Progressing, 4/10 pain following bike riding, 1/10 at best   3. Pt will demonstrate at least 4+/5 strength in B LE to improve patient's ability to get out of chair with equal weight distrubution.               PN: progressing, B LE grossly 4/5               Current: near met/progressing -- 4+/5 gross hip and knee strength, 4/5 gross ankle   4. Pt will demonstrate at least 30 seconds of SLS EO no UE on compliant surface to increase motor and sensory balance strategies to improve strength and decrease risk of falls.              PN: progressing, left SLS 15 sec EO airex               Current: met, B SLS airex EO 30 sec     ASSESSMENT/RECOMMENDATIONS:  Patient has met or nearly met all strength and functional goals.  On average at rest, left ankle pain remains between 0-2/10 and pt reports improvement in function and decreased need to utilize heel cups for pain relief. Pt had elevated pain today following bike riding for 2 miles yesterday. She was educated to reduce this activity to 1 mile at a time to allow the achilles tendon to more slowly take on greater challenge followed by ice. Pt provided with an updated HEP to continue to progress towards unmet goals.   [x]Discontinue therapy: [x]Patient has reached or is progressing toward set goals      []Patient is non-compliant or has abdicated      []Due to lack of appreciable progress towards set goals    Gloria Upton, PT 8/26/2020 1:05 PM

## 2020-09-09 ENCOUNTER — HOSPITAL ENCOUNTER (OUTPATIENT)
Dept: PHYSICAL THERAPY | Age: 70
Discharge: HOME | End: 2020-09-09
Payer: MEDICARE

## 2020-09-09 PROCEDURE — 97162 PT EVAL MOD COMPLEX 30 MIN: CPT

## 2020-09-09 PROCEDURE — 97110 THERAPEUTIC EXERCISES: CPT

## 2020-09-09 NOTE — PROGRESS NOTES
PT DAILY TREATMENT NOTE 10-18    Patient Name: Elise Singh  Date:2020  : 1950  [x]  Patient  Verified  Payor: [place] MEDICARE / Plan: [place] MEDICARE PART A & B / Product Type: Medicare /    In time:1:05  Out time:1:44  Total Treatment Time (min): 39  Visit #: 1 of 2-3    Medicare/BCBS Only   Total Timed Codes (min):  15 1:1 Treatment Time:  15       Treatment Area: Pain of left hip [M25.552]    SUBJECTIVE  Pain Level (0-10 scale): 4/10  Any medication changes, allergies to medications, adverse drug reactions, diagnosis change, or new procedure performed?: [x] No    [] Yes (see summary sheet for update)  Subjective functional status/changes:   [] No changes reported  The patient states that her left hip soreness is her chief complaint. OBJECTIVE  24 min [x]Eval                  []Re-Eval       15 min Therapeutic Exercise:  [] See flow sheet :   Rationale: increase ROM and increase strength to improve the patients ability to improve ADL ease             With   [] TE   [] TA   [] neuro   [] other: Patient Education: [x] Review HEP    [] Progressed/Changed HEP based on:   [] positioning   [] body mechanics   [] transfers   [] heat/ice application    [] other:      Other Objective/Functional Measures: See IE     Pain Level (0-10 scale) post treatment: 4/10    ASSESSMENT/Changes in Function: See POC. Patient will continue to benefit from skilled PT services to modify and progress therapeutic interventions, address functional mobility deficits, address ROM deficits, address strength deficits, analyze and address soft tissue restrictions, analyze and cue movement patterns, analyze and modify body mechanics/ergonomics, assess and modify postural abnormalities and instruct in home and community integration to attain remaining goals. [x]  See Plan of Care  []  See progress note/recertification  []  See Discharge Summary         Progress towards goals / Updated goals:  Short Term Goals:  To be accomplished in 1 weeks:              1. The patient will demonstrate independence and compliance with HEP to maximize therapeutic benefit. IE: issued HEP              2. The patient will demonstrate negative dinah left hip to improve ease of ambulation. .   IE: positive dinah  Long Term Goals: To be accomplished in 2 weeks:              1. The patient will improve FOTO score to predicted value to improve ease of ADLs. IE: not yet taken FOTO              2. The patient will improve ABD/EXT strength left hip to 4+/5 MMT to improve stability in stance. IE: 4-/5 MMT              3. The patient will demonstrate negative Ely test to improve ease of ambulation.    IE: positive       PLAN  []  Upgrade activities as tolerated     [x]  Continue plan of care  []  Update interventions per flow sheet       []  Discharge due to:_  []  Other:_      Azam Dominguez, HERRERA 9/9/2020  2:59 PM    Future Appointments   Date Time Provider Meghna Moreno   9/18/2020 10:00 AM Agustin Cassidy, PT [facility]

## 2020-09-09 NOTE — PROGRESS NOTES
[facility]  [address] [facility] [address]  [facility], [address]  [phone] fax    Plan of Care/ Statement of Necessity for Physical Therapy Services    Patient name: Francisco Gerber Start of Care: 2020   Referral source: Nikos Wright : 1950    Medical Diagnosis: Pain of left hip [M25.552]  Payor: Nancy Truong / Plan: [place] MEDICARE PART A & B / Product Type: Medicare /  Onset Date: Mid summer 2020    Treatment Diagnosis: Left hip pain   Prior Hospitalization: see medical history Provider#: [id number]   Medications: Verified on Patient summary List    Comorbidities: Arthritis, history of left achilles and plantar fascia pain   Prior Level of Function: The patient reports that she had improved ease of ambulation in the summer time prior to onset of hip pain. The Plan of Care and following information is based on the information from the initial evaluation. Assessment/ key information: The patient is a 72year old female with a chief complaint of left hip pain with that began around mid summer during her PT for her achilles pain. She did get an injection of her left hip which was helpful in decreasing her pain. She presents today with point tenderness superior to her subtrochanteric region more into her TFL region and does present with a positive dinah which does reproduce per pain. The patient presents with left mechanical hip pain with impairments consisting of pain, decreased strength, decreased flexibility of Dinah and rohit, decreased ambulation efficiency, and decreased quality of life. The patient will benefit from skilled PT in order to address the aforementioned impairments.     Evaluation Complexity History MEDIUM  Complexity : 1-2 comorbidities / personal factors will impact the outcome/ POC ; Examination MEDIUM Complexity : 3 Standardized tests and measures addressing body structure, function, activity limitation and / or participation in recreation  ;Presentation MEDIUM Complexity : Evolving with changing characteristics  ; Clinical Decision Making MEDIUM Complexity : FOTO score of 26-74  Overall Complexity Rating: MEDIUM  Problem List: pain affecting function, decrease ROM, decrease strength, decrease ADL/ functional abilitiies, decrease activity tolerance, decrease flexibility/ joint mobility and decrease transfer abilities   Treatment Plan may include any combination of the following: Therapeutic exercise, Therapeutic activities, Neuromuscular re-education, Physical agent/modality, Gait/balance training, Manual therapy, Patient education, Functional mobility training and Home safety training  Patient / Family readiness to learn indicated by: asking questions, trying to perform skills and interest  Persons(s) to be included in education: patient (P)  Barriers to Learning/Limitations: None  Patient Goal (s): alleviate pain  Patient Self Reported Health Status: good  Rehabilitation Potential: good    Short Term Goals: To be accomplished in 1 weeks:   1. The patient will demonstrate independence and compliance with HEP to maximize therapeutic benefit. 2. The patient will demonstrate negative dinah left hip to improve ease of ambulation. .  Long Term Goals: To be accomplished in 2 weeks:   1. The patient will improve FOTO score to predicted value to improve ease of ADLs. 2. The patient will improve ABD/EXT strength left hip to 4+/5 MMT to improve stability in stance. 3. The patient will demonstrate negative Ely test to improve ease of ambulation. Frequency / Duration: Patient to be seen 2 times per week for 4 weeks.     Patient/ Caregiver education and instruction: Diagnosis, prognosis, self care, activity modification and exercises   [x]  Plan of care has been reviewed with PTA    Certification Period: 9/09/2020 - 10/08/2020  Ronen Acosta, PT 9/9/2020 1:40 PM    ________________________________________________________________________    I certify that the above Therapy Services are being furnished while the patient is under my care. I agree with the treatment plan and certify that this therapy is necessary.     [facility] Signature:____________________  Date:____________Time: _________    Please sign and return to [facility] [address] [place] [address] & [facility]  Jason Gustafson [address] [place] [address], [facility] [address]  [phone] fax

## 2020-09-18 ENCOUNTER — HOSPITAL ENCOUNTER (OUTPATIENT)
Dept: PHYSICAL THERAPY | Age: 70
Discharge: HOME | End: 2020-09-18
Payer: MEDICARE

## 2020-09-18 PROCEDURE — 97110 THERAPEUTIC EXERCISES: CPT

## 2020-09-18 PROCEDURE — 97112 NEUROMUSCULAR REEDUCATION: CPT

## 2020-09-18 PROCEDURE — 97140 MANUAL THERAPY 1/> REGIONS: CPT

## 2020-09-18 NOTE — PROGRESS NOTES
[facility] [address]  [place], [address]  [phone] fax    Physical Therapy Discharge Summary     Patient name: Whitney Choudhury Start of Care: 2020   Referral source: Merna Garcia : 1950               Medical Diagnosis: Pain of left hip [M25.552]  Payor: Sal Maosn / Plan: VA MEDICARE PART A & B / Product Type: Medicare /  Onset Date: Mid summer 2020               Treatment Diagnosis: Left hip pain   Prior Hospitalization: see medical history Provider#: [id number]   Medications: Verified on Patient summary List    Comorbidities: Arthritis, history of left achilles and plantar fascia pain   Prior Level of Function: The patient reports that she had improved ease of ambulation in the summer time prior to onset of hip pain. Visits from Start of Care: 2    Missed Visits: 0  Reporting Period : 2020 to 2020    Summary of Care:  Short Term Goals: To be accomplished in 1 weeks:              1. The patient will demonstrate independence and compliance with HEP to maximize therapeutic benefit.              KF: issued HEP              Current: MET reports compliance and demonstrates independence 2020              2. The patient will demonstrate negative dinah left hip to improve ease of ambulation. .              VJ: positive dinah              Current: negative Dinah 2020  Long Term Goals: To be accomplished in 2 weeks:              6. The patient will improve FOTO score to predicted value to improve ease of ADLs.             SY: not yet taken FOTO              Current: MET 72 2020              2. The patient will improve ABD/EXT strength left hip to 4+/5 MMT to improve stability in stance.              IE: 4-/5 MMT              Current: 4+/5 MMT              3.  The patient will demonstrate negative Ely test to improve ease of ambulation.              IE: positive              Current: negative MET 9/18/2020    ASSESSMENT/RECOMMENDATIONS: The patient has met all goals, she has left with all questions answered and left with minimal pain. She is in agreement of D/C post session today.        [x]Discontinue therapy: [x]Patient has reached or is progressing toward set goals      []Patient is non-compliant or has abdicated      []Due to lack of appreciable progress towards set [address], PT 9/18/2020 10:54 AM

## 2020-09-18 NOTE — PROGRESS NOTES
PT DAILY TREATMENT NOTE 10-18    Patient Name: Jose Solomon  Date:2020  : 1950  [x]  Patient  Verified  Payor: VA MEDICARE / Plan: VA MEDICARE PART A & B / Product Type: Medicare /    In time:10:00  Out time:10;40  Total Treatment Time (min): 40  Visit #: 2 of 2-3    Medicare/BCBS Only   Total Timed Codes (min):  40 1:1 Treatment Time:  40       Treatment Area: Pain of left hip [M25.552]    SUBJECTIVE  Pain Level (0-10 scale): 3/10  Any medication changes, allergies to medications, adverse drug reactions, diagnosis change, or new procedure performed?: [x] No    [] Yes (see summary sheet for update)  Subjective functional status/changes:   [] No changes reported  The patient states her hip is doing fairly well this morning. She does report HEP compliance. OBJECTIVE  20 min Therapeutic Exercise:  [] See flow sheet :   Rationale: increase ROM and increase strength to improve the patients ability to improve ADL ease. 12 min Neuromuscular Re-education:  [x]  See flow sheet :   Rationale: increase ROM and increase strength  to improve the patients ability to improve ADL ease. 8 min Manual Therapy:  TPR of glute med and TFL left hip in right sidelying. Rationale: decrease pain, increase ROM and increase tissue extensibility to improve ADL ease. With   [] TE   [] TA   [] neuro   [] other: Patient Education: [x] Review HEP    [] Progressed/Changed HEP based on:   [] positioning   [] body mechanics   [] transfers   [] heat/ice application    [] other:      Other Objective/Functional Measures:   Ely test: negative  Jose: negative  FOTO: 71  Negative Cristopher noted left hip       Pain Level (0-10 scale) post treatment: 1/10    ASSESSMENT/Changes in Function: The patient has met all goals, she has left with all questions answered and left with minimal pain. She is in agreement of D/C post session today.      []  See Plan of Care  []  See progress note/recertification  [x]  See Discharge Summary         Progress towards goals / Updated goals:  Short Term Goals: To be accomplished in 1 weeks:              1. The patient will demonstrate independence and compliance with HEP to maximize therapeutic benefit. IE: issued HEP   Current: MET reports compliance and demonstrates independence 9/18/2020              2. The patient will demonstrate negative dinah left hip to improve ease of ambulation. .              IE: positive dinah   Current: negative Dinah 9/18/2020  Long Term Goals: To be accomplished in 2 weeks:              0. The patient will improve FOTO score to predicted value to improve ease of ADLs. IE: not yet taken FOTO   Current: MET 72 9/18/2020              2. The patient will improve ABD/EXT strength left hip to 4+/5 MMT to improve stability in stance. IE: 4-/5 MMT   Current: 4+/5 MMT              3. The patient will demonstrate negative Breann Rom test to improve ease of ambulation. IE: positive   Current: negative MET 9/18/2020     PLAN  []  Upgrade activities as tolerated     []  Continue plan of care  []  Update interventions per flow sheet       [x]  Discharge due to:_ good progress towrads goals, all goals met  []  Other:_      Filomena Chand, PT 9/18/2020  10:08 AM    No future appointments.

## 2021-07-14 ENCOUNTER — HOSPITAL ENCOUNTER (OUTPATIENT)
Dept: PHYSICAL THERAPY | Age: 71
Discharge: HOME | End: 2021-07-14
Payer: MEDICARE

## 2021-07-14 PROCEDURE — 97162 PT EVAL MOD COMPLEX 30 MIN: CPT

## 2021-07-14 PROCEDURE — 97110 THERAPEUTIC EXERCISES: CPT

## 2021-07-14 NOTE — PROGRESS NOTES
[facility]  [address]  [place], [address]  [phone] fax    Plan of Care/ Statement of Necessity for Physical Therapy Services    Patient name: Marko Cagle Start of Care: 2021   Referral source: Ara Das DPM : 1950    Medical Diagnosis: Right Achilles tendinitis [M76.61]  Payor: VA MEDICARE / Plan: VA MEDICARE PART A & B / Product Type: Medicare /  Onset Date:2021    Treatment Diagnosis: Achilles pain   Prior Hospitalization: see medical history Provider#: [id number]   Medications: Verified on Patient summary List    Comorbidities: Arthritis, allergies   Prior Level of Function: The patient had pain with walking and was limited with stair negotiation. The Plan of Care and following information is based on the information from the initial evaluation. Assessment/ key information: The patient is a 80year old female s/p ten ex with removal of bone spur removal on 2021. She was in a boot for 4 weeks partial weight bearing, with the boot and axillary crutches D/C'd following this. She continues to report that her achilles is painful with walking and does have swelling. She denies complications post operatively. The patient confirms she is going up and down stairs with a step to pattern. She has impairments related to the procedure consisting of pain, decreased ROM noting 5 degrees of DF of her left, decreased strength, decreased ambulation efficiency, and decreased stair negotiation. The patient will benefit from skilled PT in order to address the aforementioned impairments.      Evaluation Complexity History HIGH Complexity :3+ comorbidities / personal factors will impact the outcome/ POC ; Examination MEDIUM Complexity : 3 Standardized tests and measures addressing body structure, function, activity limitation and / or participation in recreation  ;Presentation MEDIUM Complexity : Evolving with changing characteristics  ; Clinical Decision Making MEDIUM Complexity : FOTO score of 26-74  Overall Complexity Rating: MEDIUM  Problem List: pain affecting function, decrease ROM, decrease strength, edema affecting function, decrease ADL/ functional abilitiies, decrease activity tolerance, decrease flexibility/ joint mobility and decrease transfer abilities   Treatment Plan may include any combination of the following: Therapeutic exercise, Therapeutic activities, Neuromuscular re-education, Physical agent/modality, Manual therapy, Patient education, Self Care training, Functional mobility training and Stair training  Patient / Family readiness to learn indicated by: asking questions, trying to perform skills and interest  Persons(s) to be included in education: patient (P)  Barriers to Learning/Limitations: None  Patient Goal (s): alleviate the pain  Patient Self Reported Health Status: good  Rehabilitation Potential: good    Short Term Goals: To be accomplished in 2 weeks:   1. The patient will demonstrate independence and compliance with HEP to maximize therapeutic benefit. 2. The patient will improve left ankle DF AROM in seated to 10 degrees to improve ease of ambulation efficiency. Long Term Goals: To be accomplished in 4 weeks:   1. The patient will improve FOTO score to 59 to improve ease of chore production. 2. The patient will demonstrate single leg heel raise full range to improve ease of ambulation efficiency during toe off of gait. 3. The patient will demonstrate 4\" step down void of compensations left LE to improve ease of stair negotiation. 4. The patient will improve gastroc flexibility to 10 degrees to improve ease of ambulation efficiency. Frequency / Duration: Patient to be seen 2 times per week for 4 weeks.     Patient/ Caregiver education and instruction: Diagnosis, prognosis, self care, activity modification and exercises   [x]  Plan of care has been reviewed with PTA    Certification Period: 7/14/2021 - 8/12/2021  Wang Baig, PT 7/14/2021 1:52 PM    ________________________________________________________________________    I certify that the above Therapy Services are being furnished while the patient is under my care. I agree with the treatment plan and certify that this therapy is necessary.     [address] [place] [address] Signature:____________________  Date:____________Time: _________     Juanita Bravo DPM  Please sign and return to [facility] [address] [address] [address]  [place], [facility] [address]  [phone] fax

## 2021-07-14 NOTE — PROGRESS NOTES
PT DAILY TREATMENT NOTE     Patient Name: Marry Campbell  Date:2021  : 1950  [x]  Patient  Verified  Payor: VA MEDICARE / Plan: VA MEDICARE PART A & B / Product Type: Medicare /    In time: 1:04  Out time: 1:40  Total Treatment Time (min): 36  Visit #: 1 of 8    Medicare/BCBS Only   Total Timed Codes (min):  12 1:1 Treatment Time:  12       Treatment Area: Right Achilles tendinitis [M76.61]    SUBJECTIVE  Pain Level (0-10 scale): 7/10  Any medication changes, allergies to medications, adverse drug reactions, diagnosis change, or new procedure performed?: [x] No    [] Yes (see summary sheet for update)  Subjective functional status/changes:   [] No changes reported  The patient reports that she has a chief complaint of left ankle strength     OBJECTIVE  24 min [x]Eval                  []Re-Eval       12 min Therapeutic Exercise:  [x] See flow sheet :   Rationale: increase ROM and increase strength to improve the patients ability to improve ADL ease. With   [] TE   [] TA   [] neuro   [] other: Patient Education: [x] Review HEP    [] Progressed/Changed HEP based on:   [] positioning   [] body mechanics   [] transfers   [] heat/ice application    [] other:      Other Objective/Functional Measures: See IE     Pain Level (0-10 scale) post treatment: 5/10    ASSESSMENT/Changes in Function: See POC    Patient will continue to benefit from skilled PT services to modify and progress therapeutic interventions, address functional mobility deficits, address ROM deficits, address strength deficits, analyze and address soft tissue restrictions, analyze and cue movement patterns, analyze and modify body mechanics/ergonomics, assess and modify postural abnormalities, address imbalance/dizziness and instruct in home and community integration to attain remaining goals.      []  See Plan of Care  []  See progress note/recertification  []  See Discharge Summary         Progress towards goals / Updated goals:  Short Term Goals: To be accomplished in 2 weeks:              1. The patient will demonstrate independence and compliance with HEP to maximize therapeutic benefit. IE: issued HEP              2. The patient will improve left ankle DF AROM in seated to 10 degrees to improve ease of ambulation efficiency. IE: 5 degrees  Long Term Goals: To be accomplished in 4 weeks:              1. The patient will improve FOTO score to 59 to improve ease of chore production. IE: 36               2. The patient will demonstrate single leg heel raise full range to improve ease of ambulation efficiency during toe off of gait. IE: 50% WB full range              3. The patient will demonstrate 4\" step down void of compensations left LE to improve ease of stair negotiation. IE: 2\" step downs              4. The patient will improve gastroc flexibility to 10 degrees to improve ease of ambulation efficiency.    IE: 1 degree    PLAN  []  Upgrade activities as tolerated     [x]  Continue plan of care  []  Update interventions per flow sheet       []  Discharge due to:_  []  Other:_      Ryan Flowers, PT 7/14/2021  2:25 PM    Future Appointments   Date Time Provider Meghna Moreno   7/16/2021  9:15 AM Alvaro Elias, PTA [facility]   7/22/2021 12:00 PM Nora Richardson, PT [facility]   7/27/2021  2:30 PM Adelina Melendrez, PT [facility]   8/5/2021  1:30 PM [facility], PT [facility]   8/9/2021 10:30 AM [facility], PT [facility]   8/11/2021  9:15 AM Bianca Cassidy, PT [facility]

## 2021-07-16 ENCOUNTER — HOSPITAL ENCOUNTER (OUTPATIENT)
Dept: PHYSICAL THERAPY | Age: 71
Discharge: HOME | End: 2021-07-16
Payer: MEDICARE

## 2021-07-16 PROCEDURE — 97140 MANUAL THERAPY 1/> REGIONS: CPT

## 2021-07-16 PROCEDURE — 97112 NEUROMUSCULAR REEDUCATION: CPT

## 2021-07-16 PROCEDURE — 97110 THERAPEUTIC EXERCISES: CPT

## 2021-07-16 NOTE — PROGRESS NOTES
PT DAILY TREATMENT NOTE     Patient Name: Devan Weston  Date:2021  : 1950  [x]  Patient  Verified  Payor: [place] MED[place]ARE / Plan: [place] MEDICARE PART A & B / Product Type: Medicare /    In time:9:15  Out time:10:07  Total Treatment Time (min): 52  Visit #: 2 of 8    Medicare/BCBS Only   Total Timed Codes (min):  42 1:1 Treatment Time:  42       Treatment Area: Right Achilles tendinitis [M76.61]    SUBJECTIVE  Pain Level (0-10 scale): 7  Any medication changes, allergies to medications, adverse drug reactions, diagnosis change, or new procedure performed?: [x] No    [] Yes (see summary sheet for update)  Subjective functional status/changes:   [] No changes reported  Pt report ankle feels sore    OBJECTIVE    Modality rationale: decrease pain to improve the patients ability to perform daily tasks   Min Type Additional Details    [] Estim:  []Unatt       []IFC  []Premod                        []Other:  []w/ice   []w/heat  Position:  Location:    [] Estim: []Att    []TENS instruct  []NMES                    []Other:  []w/US   []w/ice   []w/heat  Position:  Location:    []  Traction: [] Cervical       []Lumbar                       [] Prone          []Supine                       []Intermittent   []Continuous Lbs:  [] before manual  [] after manual    []  Ultrasound: []Continuous   [] Pulsed                           []1MHz   []3MHz W/cm2:  Location:    []  Iontophoresis with dexamethasone         Location: [] Take home patch   [] In clinic    []  Ice     []  heat  []  Ice massage  []  Laser   []  Anodyne Position:  Location:    []  Laser with stim  []  Other:  Position:  Location:   10 [x]  Vasopneumatic Device    []  Right     [x]  Left  Pre-treatment girth:  Post-treatment girth:  Measured at (location):  Pressure:       [x] lo [] med [] hi   Temperature: [x] lo [] med [] hi   [] Skin assessment post-treatment:  []intact []redness- no adverse reaction    []redness  adverse reaction:     26 min Therapeutic Exercise:  [x] See flow sheet :   Rationale: increase ROM and increase strength to improve the patients ability to perform ADLs    8 min Neuromuscular Re-education:  [x]  See flow sheet :   Rationale: increase strength, improve coordination, improve balance and increase proprioception  to improve the patients ability to perform functional tasks    8 min Manual Therapy:  Left ankle DF mobs, DTM/STM to gastroc   The manual therapy interventions were performed at a separate and distinct time from the therapeutic activities interventions. Rationale: decrease pain, increase ROM and increase tissue extensibility to improve functional mobility            With   [] TE   [] TA   [] neuro   [] other: Patient Education: [x] Review HEP    [] Progressed/Changed HEP based on:   [] positioning   [] body mechanics   [] transfers   [] heat/ice application    [] other:      Other Objective/Functional Measures:   First f/u after Eval     Pain Level (0-10 scale) post treatment: 7    ASSESSMENT/Changes in Function: Initiated treatment program per flow sheet. Reviewed HEP for understanding and compliance. Cont's to amb with antalgic gait. C/o medial ankle pain with retro and tandem amb. MS tension and TTP @ lateral gastroc. Cont'd antalgic gait following treatment today. Patient will continue to benefit from skilled PT services to modify and progress therapeutic interventions, address functional mobility deficits, address ROM deficits, address strength deficits, analyze and address soft tissue restrictions, analyze and cue movement patterns, analyze and modify body mechanics/ergonomics, assess and modify postural abnormalities and address imbalance/dizziness to attain remaining goals. []  See Plan of Care  []  See progress note/recertification  []  See Discharge Summary         Progress towards goals / Updated goals:  Short Term Goals: To be accomplished in 2 weeks:              4.  The patient will demonstrate independence and compliance with HEP to maximize therapeutic benefit. IE: issued HEP   Current: Pt reports min compliance 7/16/2021              2. The patient will improve left ankle DF AROM in seated to 10 degrees to improve ease of ambulation efficiency. IE: 5 degrees  Long Term Goals: To be accomplished in 4 weeks:              1. The patient will improve FOTO score to 59 to improve ease of chore production. IE: 36               2. The patient will demonstrate single leg heel raise full range to improve ease of ambulation efficiency during toe off of gait. IE: 50% WB full range              3. The patient will demonstrate 4\" step down void of compensations left LE to improve ease of stair negotiation. IE: 2\" step downs  [id number]. The patient will improve gastroc flexibility to 10 degrees to improve ease of ambulation efficiency.               IE: 1 degree    PLAN  []  Upgrade activities as tolerated     [x]  Continue plan of care  []  Update interventions per flow sheet       []  Discharge due to:_  []  Other:_      Isaiah Mederos, ANNAMARIA 7/16/2021  9:26 AM    Future Appointments   Date Time Provider Meghna Moreno   7/22/2021 12:00 PM Lucy Fox, PT [facility]   7/27/2021  2:30 PM Dank Chahal, PT [facility]   8/5/2021  1:30 PM Sunil Bledsoe PT [facility]   8/9/2021 10:30 AM Sunil Bledsoe, PT [facility]   8/11/2021  9:15 AM Joe Cassidy, PT [facility]

## 2021-07-20 ENCOUNTER — HOSPITAL ENCOUNTER (OUTPATIENT)
Dept: PHYSICAL THERAPY | Age: 71
Discharge: HOME | End: 2021-07-20
Payer: MEDICARE

## 2021-07-20 PROCEDURE — 97112 NEUROMUSCULAR REEDUCATION: CPT

## 2021-07-20 PROCEDURE — 97110 THERAPEUTIC EXERCISES: CPT

## 2021-07-20 PROCEDURE — 97016 VASOPNEUMATIC DEVICE THERAPY: CPT

## 2021-07-20 PROCEDURE — 97140 MANUAL THERAPY 1/> REGIONS: CPT

## 2021-07-20 NOTE — PROGRESS NOTES
PT DAILY TREATMENT NOTE     Patient Name: Randee Cunha  Date:2021  : 1950  [x]  Patient  Verified  Payor: [place] MEDICARE / Plan: [place] MEDICARE PART A & B / Product Type: Medicare /    In time: 9963  Out time: 7251  Total Treatment Time (min): 51  Visit #: 3 of 8    Medicare/BCBS Only   Total Timed Codes (min):  41 1:1 Treatment Time:  41       Treatment Area: Right Achilles tendinitis [M76.61]    SUBJECTIVE  Pain Level (0-10 scale): 6  Any medication changes, allergies to medications, adverse drug reactions, diagnosis change, or new procedure performed?: [x] No    [] Yes (see summary sheet for update)  Subjective functional status/changes:   [] No changes reported  \"My foot still hurts. I was sore for the better part of the day after last time. \"    OBJECTIVE    Modality rationale: decrease edema, decrease inflammation and decrease pain to improve the patients ability to perform functional tasks with greater ease    Min Type Additional Details    [] Estim:  []Unatt       []IFC  []Premod                        []Other:  []w/ice   []w/heat  Position:  Location:    [] Estim: []Att    []TENS instruct  []NMES                    []Other:  []w/US   []w/ice   []w/heat  Position:  Location:    []  Traction: [] Cervical       []Lumbar                       [] Prone          []Supine                       []Intermittent   []Continuous Lbs:  [] before manual  [] after manual    []  Ultrasound: []Continuous   [] Pulsed                           []1MHz   []3MHz W/cm2:  Location:    []  Iontophoresis with dexamethasone         Location: [] Take home patch   [] In clinic    []  Ice     []  heat  []  Ice massage  []  Laser   []  Anodyne Position:  Location:    []  Laser with stim  []  Other:  Position:  Location:   10 [x]  Vasopneumatic Device    []  Right     [x]  Left Pressure:       [x] lo [] med [] hi   Temperature: [x] lo [] med [] hi   [] Skin assessment post-treatment:  []intact []redness- no adverse reaction    []redness  adverse reaction:     19 min Therapeutic Exercise:  [x] See flow sheet :   Rationale: increase ROM, increase strength and improve coordination to improve the patients ability to perform ADLs and self care tasks with greater ease      14 min Neuromuscular Re-education:  [x]  See flow sheet : SLS balance, airex tandem balance, tandem walking, core align hip extension    Rationale: increase strength, improve coordination, improve balance and increase proprioception  to improve the patients ability to perform functional tasks with improved stabilization and ease     8 min Manual Therapy:  Reclined - grade II/III left ankle dorsiflexion mobilization, PROM of left ankle, gentle retrograde massage to assist with swelling management     The manual therapy interventions were performed at a separate and distinct time from the therapeutic activities interventions. Rationale: decrease pain, increase ROM and increase tissue extensibility to perform functional tasks with greater ease           With   [] TE   [] TA   [] neuro   [] other: Patient Education: [x] Review HEP    [] Progressed/Changed HEP based on:   [] positioning   [] body mechanics   [] transfers   [] heat/ice application    [] other:      Other Objective/Functional Measures: introduced core align hip extension on left today     Left ankle DF AROM 8 degrees      Pain Level (0-10 scale) post treatment: 0 - rest, 6 - with movement     ASSESSMENT/Changes in Function: Patient denying increased discomfort throughout entire treatment session. Addition of core align hip extension with tolerated. Patient continues to be limited with DF AROM, however demonstrates some improvement since IE. Patient leaving with no pain at rest, however has no change in pain with movement/activity. Continue progressing AROM, left LE strength, and balance to improve ease with overall functional mobility.      Patient will continue to benefit from skilled PT services to modify and progress therapeutic interventions, address functional mobility deficits, address ROM deficits, address strength deficits, analyze and address soft tissue restrictions, analyze and cue movement patterns, analyze and modify body mechanics/ergonomics, assess and modify postural abnormalities, address imbalance/dizziness and instruct in home and community integration to attain remaining goals. []  See Plan of Care  []  See progress note/recertification  []  See Discharge Summary         Progress towards goals / Updated goals:  Short Term Goals: To be accomplished in 2 weeks:              7. The patient will demonstrate independence and compliance with HEP to maximize therapeutic benefit.              SV: issued HEP              Current: Pt reports min compliance 7/16/2021, improved compliance 7/20/2021              2. The patient will improve left ankle DF AROM in seated to 10 degrees to improve ease of ambulation efficiency.             UB: 5 degrees   Current: Slow progress 7/20/2021 - 8 degrees post manual   Long Term Goals: To be accomplished in 4 weeks:              1. The patient will improve FOTO score to 59 to improve ease of chore production.              IE: 40               2. The patient will demonstrate single leg heel raise full range to improve ease of ambulation efficiency during toe off of gait.             YD: 50% WB full range              3. The patient will demonstrate 4\" step down void of compensations left LE to improve ease of stair negotiation.              IW: 2\" step downs              4. The patient will improve gastroc flexibility to 10 degrees to improve ease of ambulation efficiency.               PC: 1 degree    PLAN  []  Upgrade activities as tolerated     []  Continue plan of care  []  Update interventions per flow sheet       []  Discharge due to:_  []  Other:_      Mariam Gracia, PT, DPT 7/20/2021  12:06 PM    Future Appointments   Date Time Provider Department [facility]   7/27/2021  2:30 PM Falguni Chou, PT [facility]   8/5/2021  1:30 PM Tanja Kirkpatrick, PT [facility]   8/9/2021 10:30 AM Tanja Kirkpatrick, PT [facility]   8/11/2021  9:15 AM Yadi Cassidy, PT [facility]

## 2021-07-22 ENCOUNTER — APPOINTMENT (OUTPATIENT)
Dept: PHYSICAL THERAPY | Age: 71
End: 2021-07-22
Payer: MEDICARE

## 2021-07-27 ENCOUNTER — APPOINTMENT (OUTPATIENT)
Dept: PHYSICAL THERAPY | Age: 71
End: 2021-07-27
Payer: MEDICARE

## 2021-08-05 ENCOUNTER — HOSPITAL ENCOUNTER (OUTPATIENT)
Dept: PHYSICAL THERAPY | Age: 71
Discharge: HOME | End: 2021-08-05
Payer: MEDICARE

## 2021-08-05 PROCEDURE — 97112 NEUROMUSCULAR REEDUCATION: CPT

## 2021-08-05 PROCEDURE — 97140 MANUAL THERAPY 1/> REGIONS: CPT

## 2021-08-05 PROCEDURE — 97016 VASOPNEUMATIC DEVICE THERAPY: CPT

## 2021-08-05 PROCEDURE — 97110 THERAPEUTIC EXERCISES: CPT

## 2021-08-05 NOTE — PROGRESS NOTES
PT DAILY TREATMENT NOTE     Patient Name: Lashell Sanders  Date:2021  : 1950  [x]  Patient  Verified  Payor: [place] MEDICARE / Plan: [place] MEDICARE PART A & B / Product Type: Medicare /    In time:1:32  Out time:2:30  Total Treatment Time (min): 58  Visit #: 4 of 8     Medicare/BCBS Only   Total Timed Codes (min):  48 1:1 Treatment Time:  48       Treatment Area: Right Achilles tendinitis [M76.61]    SUBJECTIVE  Pain Level (0-10 scale): 7/10  Any medication changes, allergies to medications, adverse drug reactions, diagnosis change, or new procedure performed?: [x] No    [] Yes (see summary sheet for update)  Subjective functional status/changes:   [] No changes reported  The patient states that her foot continues to have the same pain in the same area. OBJECTIVE  Modality rationale: decrease edema, decrease inflammation and decrease pain to improve the patients ability to improve ADL ease. Min Type Additional Details   10 [x]  Vasopneumatic Device    []  Right     [x]  Left  Pre-treatment girth:  Post-treatment girth:  Measured at (location):  Pressure:       [x] lo [] med [] hi   Temperature: [x] lo [] med [] hi   [] Skin assessment post-treatment:  []intact []redness- no adverse reaction    []redness  adverse reaction:     25 min Therapeutic Exercise:  [] See flow sheet :   Rationale: increase ROM and increase strength to improve the patients ability to improve ADL ease. 15 min Neuromuscular Re-education:  []  See flow sheet :   Rationale: improve coordination, improve balance and increase proprioception  to improve the patients ability to improve ADL ease. 8 min Manual Therapy:  Left scar massage and talocrural DF mobs grade III with the patient in prone with both knee extended and knee flexed to 90 degrees. The manual therapy interventions were performed at a separate and distinct time from the therapeutic activities interventions.   Rationale: decrease pain, increase ROM and increase tissue extensibility to improve ADL ease. With   [] TE   [] TA   [] neuro   [] other: Patient Education: [x] Review HEP    [] Progressed/Changed HEP based on:   [] positioning   [] body mechanics   [] transfers   [] heat/ice application    [] other:      Other Objective/Functional Measures:   DF: 10 degrees AROM  gastroc flexibility: 6 degrees    Pain Level (0-10 scale) post treatment: 5/10    ASSESSMENT/Changes in Function: The patient continues to report progress regarding her compliance of HEP. Able to progress eccentric lowering to 75% on her left without significant pain increase. Progressed HEP to include this 75% weight bearing with eccentric lowering as well as progressing SLS to 30\" with HEP. The patient verbalizes understanding and has demonstrated improvements regarding her active DF flexibility of both gastroc and capsular mobility. She leaves in no apparent distress. Patient will continue to benefit from skilled PT services to modify and progress therapeutic interventions, address functional mobility deficits, address ROM deficits, address strength deficits, analyze and address soft tissue restrictions, analyze and cue movement patterns, analyze and modify body mechanics/ergonomics, assess and modify postural abnormalities and instruct in home and community integration to attain remaining goals. []  See Plan of Care  []  See progress note/recertification  []  See Discharge Summary         Progress towards goals / Updated goals:  Short Term Goals: To be accomplished in 2 weeks:              4. The patient will demonstrate independence and compliance with HEP to maximize therapeutic benefit.              IE: issued HEP              [id number]: Pt reports min compliance 7/16/2021, improved compliance 7/20/2021              2. The patient will improve left ankle DF AROM in seated to 10 degrees to improve ease of ambulation efficiency.               IE: 5 degrees Current: Slow progress 2021 - 8 degrees post manual   Long Term Goals: To be accomplished in 4 weeks:              1. The patient will improve FOTO score to 59 to improve ease of chore production.              IE: 40               2. The patient will demonstrate single leg heel raise full range to improve ease of ambulation efficiency during toe off of gait.             IQ: 50% WB full range              3. The patient will demonstrate 4\" step down void of compensations left LE to improve ease of stair negotiation.              QA: 2\" step downs              4. The patient will improve gastroc flexibility to 10 degrees to improve ease of ambulation efficiency.               Y degree    Current: Progressing - 6 degrees 2021  PLAN  []  Upgrade activities as tolerated     [x]  Continue plan of care  []  Update interventions per flow sheet       []  Discharge due to:_  []  Other:_      Vielka Anna, PT 2021  1:43 PM    Future Appointments   Date Time Provider Meghna Moreno   2021 10:30 AM [facility]   2021  9:15 AM [facility]   2021 10:30 AM [facility]

## 2021-08-09 ENCOUNTER — HOSPITAL ENCOUNTER (OUTPATIENT)
Dept: PHYSICAL THERAPY | Age: 71
Discharge: HOME | End: 2021-08-09
Payer: MEDICARE

## 2021-08-09 PROCEDURE — 97140 MANUAL THERAPY 1/> REGIONS: CPT

## 2021-08-09 PROCEDURE — 97110 THERAPEUTIC EXERCISES: CPT

## 2021-08-09 PROCEDURE — 97112 NEUROMUSCULAR REEDUCATION: CPT

## 2021-08-09 NOTE — PROGRESS NOTES
PT DAILY TREATMENT NOTE     Patient Name: Bridger Milling  Date:2021  : 1950  [x]  Patient  Verified  Payor: [place] MEDICARE / Plan: [place] MEDICARE PART A & B / Product Type: Medicare /    In time:10:30  Out time:11:27  Total Treatment Time (min): 62  Visit #: 5 of 8    Medicare/BCBS Only   Total Timed Codes (min):  47 1:1 Treatment Time:  47       Treatment Area: Right Achilles tendinitis [M76.61]    SUBJECTIVE  Pain Level (0-10 scale): 5/10  Any medication changes, allergies to medications, adverse drug reactions, diagnosis change, or new procedure performed?: [x] No    [] Yes (see summary sheet for update)  Subjective functional status/changes:   [] No changes reported  The patient reports that she has continued pain of her left achilles. OBJECTIVE     Modality rationale: decrease edema, decrease inflammation and decrease pain to improve the patients ability to improve ADL ease. Min Type Additional Details   10 [x]  Vasopneumatic Device    []  Right     [x]  Left  Pre-treatment girth:  Post-treatment girth:  Measured at (location):  Pressure:       [x] lo [] med [] hi   Temperature: [x] lo [] med [] hi   [] Skin assessment post-treatment:  []intact []redness- no adverse reaction    []redness  adverse reaction:     24 min Therapeutic Exercise:  [] See flow sheet :   Rationale: increase ROM and increase strength to improve the patients ability to improve ADL ease. 15 min Neuromuscular Re-education:  [x]  See flow sheet : airex interventions, step ups with airex, tandem walking   Rationale: improve coordination, improve balance and increase proprioception  to improve the patients ability to improve ADL ease. 8 min Manual Therapy:  Left scar massage and talocrural DF mobs grade III with the patient in prone with both knee extended and knee flexed to 90 degrees.    The manual therapy interventions were performed at a separate and distinct time from the therapeutic activities interventions. Rationale: decrease pain, increase ROM, increase tissue extensibility and decrease trigger points to improve ADL ease. With   [] TE   [] TA   [] neuro   [] other: Patient Education: [x] Review HEP    [] Progressed/Changed HEP based on:   [] positioning   [] body mechanics   [] transfers   [] heat/ice application    [] other:      Other Objective/Functional Measures:   Able to demonstrate 4\" step downs without significant pain increase. Able to progress to 2 sets of eccentric lowering left achilles complex. Pain Level (0-10 scale) post treatment: 5/10    ASSESSMENT/Changes in Function: The patient demonstrates ability to perform step downs at 4\" steps without compensations and no increase in pain from resting pain noting improvement and meeting of LTG. PT encouraged the patient to progress HEP to include 2 sets of eccentric lowering due to it being fairly well tolerated in the clinic without exacerbation of pain. She verbalized understanding and did leave the clinic with all questions answered and in no apparent distress. Patient will continue to benefit from skilled PT services to modify and progress therapeutic interventions, address functional mobility deficits, address ROM deficits, address strength deficits, analyze and address soft tissue restrictions, analyze and cue movement patterns, analyze and modify body mechanics/ergonomics, assess and modify postural abnormalities and instruct in home and community integration to attain remaining goals. []  See Plan of Care  []  See progress note/recertification  []  See Discharge Summary         Progress towards goals / Updated goals:  Short Term Goals: To be accomplished in 2 weeks:              2. The patient will demonstrate independence and compliance with HEP to maximize therapeutic benefit.              IE: issued HEP              [id number]: Pt reports min compliance 7/16/2021, improved compliance 7/20/2021              2.  The patient will improve left ankle DF AROM in seated to 10 degrees to improve ease of ambulation efficiency.             GARRETT: 5 degrees              [id number]: Slow progress 7/20/2021 - 8 degrees post manual   Long Term Goals: To be accomplished in 4 weeks:              1. The patient will improve FOTO score to 59 to improve ease of chore production.              IE: 40               2. The patient will demonstrate single leg heel raise full range to improve ease of ambulation efficiency during toe off of gait.             QK: 50% WB full range              3. The patient will demonstrate 4\" step down void of compensations left LE to improve ease of stair negotiation.              IE: 2\" step downs   Current: progressing, initiated 4\" step downs without significant pain increase. 8/09/2021              4. The patient will improve gastroc flexibility to 10 degrees to improve ease of ambulation efficiency.               KI: 1 degree              Current: Progressing - 6 degrees 8/05/2021    PLAN  []  Upgrade activities as tolerated     []  Continue plan of care  []  Update interventions per flow sheet       []  Discharge due to:_  []  Other:_      Jenni Mckeon, PT 8/9/2021  10:36 AM    Future Appointments   Date Time Provider Meghna Moreno   8/11/2021  9:15 AM Nolan Carrillo, PT [facility]   8/16/2021 10:30 AM [facility]

## 2021-08-11 ENCOUNTER — APPOINTMENT (OUTPATIENT)
Dept: PHYSICAL THERAPY | Age: 71
End: 2021-08-11
Payer: MEDICARE

## 2021-08-16 ENCOUNTER — HOSPITAL ENCOUNTER (OUTPATIENT)
Dept: PHYSICAL THERAPY | Age: 71
Discharge: HOME | End: 2021-08-16
Payer: MEDICARE

## 2021-08-16 PROCEDURE — 97016 VASOPNEUMATIC DEVICE THERAPY: CPT

## 2021-08-16 PROCEDURE — 97112 NEUROMUSCULAR REEDUCATION: CPT

## 2021-08-16 PROCEDURE — 97110 THERAPEUTIC EXERCISES: CPT

## 2021-08-16 PROCEDURE — 97140 MANUAL THERAPY 1/> REGIONS: CPT

## 2021-08-16 NOTE — PROGRESS NOTES
[facility]  [address]  [place], [address]  [phone] fax    Continued Plan of Care/ Re-certification for Physical Therapy Services    Patient name: Bridget Arnold Start of Care: 2021   Referral source: Sarah Fajardo DPM : 1950               Medical Diagnosis: Right Achilles tendinitis [M76.61]  Payor: [place] MEDICARE / Plan: [place] MEDICARE PART A & B / Product Type: Medicare /  Onset Date:2021               Treatment Diagnosis: Achilles pain   Prior Hospitalization: see medical history Provider#: [id number]   Medications: Verified on Patient summary List    Comorbidities: Arthritis, allergies   Prior Level of Function: The patient had pain with walking and was limited with stair negotiation. Visits from Start of Care: 6    Missed Visits: 0    The Plan of Care and following information is based on the patient's current status:  Short Term Goals: To be accomplished in 2 weeks:              7. The patient will demonstrate independence and compliance with HEP to maximize therapeutic benefit.              IE: issued HEP              PYHXCMX: Pt reports min compliance 2021, improved compliance 2021              2. The patient will improve left ankle DF AROM in seated to 10 degrees to improve ease of ambulation efficiency.             IC: 5 degrees              RAWTSTE: Slow progress 2021 - 8 degrees post manual   Long Term Goals: To be accomplished in 4 weeks:              1. The patient will improve FOTO score to 59 to improve ease of chore production.              IE: 40               Current: Progressed to 52 2021              2. The patient will demonstrate single leg heel raise full range to improve ease of ambulation efficiency during toe off of gait.               AK: 50% WB full range              Current: Progressing - able to perform eccentric heel lower with single leg without significant increase in pain 8/16/2021              3. The patient will demonstrate 4\" step down void of compensations left LE to improve ease of stair negotiation.              AQ: 2\" step downs              Current: progressing, initiated 4\" step downs without significant pain increase. 8/09/2021              4. The patient will improve gastroc flexibility to 10 degrees to improve ease of ambulation efficiency.             NC: 1 degree              Current: Progressing - 6 degrees 8/05/2021    Key functional changes: Improved heel raises, FOTO score indicating improved ease of chore production, and stair negotiation with 4\" step down ability. Problems/ barriers to goal attainment: Chronicity of pain. Problem List: pain affecting function, decrease ROM, decrease strength, decrease ADL/ functional abilitiies, decrease activity tolerance and decrease flexibility/ joint mobility    Treatment Plan: Therapeutic exercise, Therapeutic activities, Neuromuscular re-education, Physical agent/modality, Gait/balance training, Manual therapy, Patient education, Self Care training, Functional mobility training and Stair training     Patient Goal (s) has been updated and includes: less pain     Goals for this certification period to be accomplished in 4 weeks:    1. The patient will improve FOTO score to 59 to improve ease of chore production.              Re-cert:  Progressed to 52              2. The patient will demonstrate single leg heel raise full range to improve ease of ambulation efficiency during toe off of gait.             EW-SFHL[de-identified] Progressing - able to perform eccentric heel lower with single leg without significant increase in pain              3. The patient will demonstrate 4\" step down void of compensations left LE to improve ease of stair negotiation.              Re-cert: progressing, initiated 4\" step downs without significant pain increase. 8/09/2021              4.  The patient will improve gastroc flexibility to 10 degrees to improve ease of ambulation efficiency.            Re-cert: Progressing - 6 degrees 8/05/2021    Frequency / Duration: Patient to be seen 2 times per week for 4 weeks:    Assessment / Recommendations: The patient appears to be making progress objectively regarding strength and range of motion, though her chief complaint regarding her pain has remained unchanged. Continue for a few additional visits with emphasis of strengthening, if no change, D/C at that time. Certification Period: 8/16/2021 - 9/14/2021     Amy Castillo, PT 8/16/2021 12:54 PM    ________________________________________________________________________  I certify that the above Therapy Services are being furnished while the patient is under my care. I agree with the treatment plan and certify that this therapy is necessary. [] I have read the above and request that my patient continue as recommended.   [] I have read the above report and request that my patient continue therapy with the following changes/special instructions: _____________________________________________  [] I have read the above report and request that my patient be discharged from therapy    Physician's Signature:____________Date:_________TIME:________     Elmo Glass DPM  ** Signature, Date and Time must be completed for valid certification **    Please sign and return to [facility] [address] [address] [address]  [place], [address]  [phone] fax

## 2021-08-16 NOTE — PROGRESS NOTES
PT DAILY TREATMENT NOTE     Patient Name: Anat Oswald  Date:2021  : 1950  [x]  Patient  Verified  Payor: VA MEDICARE / Plan: VA MEDICARE PART A & B / Product Type: Medicare /    In time:10:30  Out time:11:20  Total Treatment Time (min): 50  Visit #: 6 of 8    Medicare/BCBS Only   Total Timed Codes (min):  40 1:1 Treatment Time:  40       Treatment Area: Right Achilles tendinitis [M76.61]    SUBJECTIVE  Pain Level (0-10 scale): 5/10  Any medication changes, allergies to medications, adverse drug reactions, diagnosis change, or new procedure performed?: [x] No    [] Yes (see summary sheet for update)  Subjective functional status/changes:   [] No changes reported  The patient reports no real change in her pain. She continues to experience pain with walking and standing. OBJECTIVE   Modality rationale: decrease edema, decrease inflammation and decrease pain to improve the patients ability to improve ADL ease. Min Type Additional Details   10 [x]  Vasopneumatic Device    []  Right     [x]  Left  Pre-treatment girth:  Post-treatment girth:  Measured at (location):  Pressure:       [x] lo [] med [] hi   Temperature: [x] lo [] med [] hi   [] Skin assessment post-treatment:  []intact []redness- no adverse reaction    []redness  adverse reaction:     17 min Therapeutic Exercise:  [x] See flow sheet :   Rationale: increase ROM and increase strength to improve the patients ability to improve ADL ease. 15 min Neuromuscular Re-education:  [x]  See flow sheet :   Rationale: improve coordination, improve balance and increase proprioception  to improve the patients ability to improve ADL ease. 8 min Manual Therapy:  Left scar massage and talocrural DF mobs grade III with the patient in prone with both knee extended and knee flexed to 90 degrees. The manual therapy interventions were performed at a separate and distinct time from the therapeutic activities interventions.   Rationale: decrease pain, increase ROM and increase tissue extensibility to improve ADL ease. With   [] TE   [] TA   [] neuro   [] other: Patient Education: [x] Review HEP    [] Progressed/Changed HEP based on:   [] positioning   [] body mechanics   [] transfers   [] heat/ice application    [] other:      Other Objective/Functional Measures: FOTO: 52     Pain Level (0-10 scale) post treatment: 5/10    ASSESSMENT/Changes in Function: The patient appears to be making progress objectively regarding strength and range of motion, though her chief complaint regarding her pain has remained unchanged. Continue for a few additional visits with emphasis of strengthening, if no change, D/C at that time. Patient will continue to benefit from skilled PT services to modify and progress therapeutic interventions, address functional mobility deficits, address ROM deficits, address strength deficits, analyze and address soft tissue restrictions, analyze and cue movement patterns, analyze and modify body mechanics/ergonomics, assess and modify postural abnormalities, address imbalance/dizziness and instruct in home and community integration to attain remaining goals. []  See Plan of Care  []  See progress note/recertification  []  See Discharge Summary         Progress towards goals / Updated goals:  Short Term Goals: To be accomplished in 2 weeks:              9. The patient will demonstrate independence and compliance with HEP to maximize therapeutic benefit.              IE: issued HEP              JCBWCUT: Pt reports min compliance 7/16/2021, improved compliance 7/20/2021              2. The patient will improve left ankle DF AROM in seated to 10 degrees to improve ease of ambulation efficiency.             BH: 5 degrees              XYIUXMV: Slow progress 7/20/2021 - 8 degrees post manual   Long Term Goals: To be accomplished in 4 weeks:              1.  The patient will improve FOTO score to 59 to improve ease of chore production.              QZ: 40    Current: Progressed to 52 8/16/2021              2. The patient will demonstrate single leg heel raise full range to improve ease of ambulation efficiency during toe off of gait.             ST: 50% WB full range   Current: Progressing - able to perform eccentric heel lower with single leg without significant increase in pain 8/16/2021              3. The patient will demonstrate 4\" step down void of compensations left LE to improve ease of stair negotiation.              IE: 2\" step downs              Current: progressing, initiated 4\" step downs without significant pain increase. 8/09/2021              4. The patient will improve gastroc flexibility to 10 degrees to improve ease of ambulation efficiency.               IB: 1 degree              NHTUIGY: Progressing - 6 degrees 8/05/2021    PLAN  []  Upgrade activities as tolerated     [x]  Continue plan of care  []  Update interventions per flow sheet       []  Discharge due to:_  []  Other:_      Vielka Anna, PT 8/16/2021  10:35 AM    Future Appointments   Date Time Provider Meghna Moreno   8/18/2021  7:45 AM [facility]   8/24/2021  2:15 PM Matias Cassidy[facility]

## 2021-08-18 ENCOUNTER — HOSPITAL ENCOUNTER (OUTPATIENT)
Dept: PHYSICAL THERAPY | Age: 71
Discharge: HOME | End: 2021-08-18
Payer: MEDICARE

## 2021-08-18 PROCEDURE — 97016 VASOPNEUMATIC DEVICE THERAPY: CPT

## 2021-08-18 PROCEDURE — 97110 THERAPEUTIC EXERCISES: CPT

## 2021-08-18 PROCEDURE — 97112 NEUROMUSCULAR REEDUCATION: CPT

## 2021-08-18 PROCEDURE — 97140 MANUAL THERAPY 1/> REGIONS: CPT

## 2021-08-18 NOTE — PROGRESS NOTES
PT DAILY TREATMENT NOTE     Patient Name: Oracio Kraft  Date:2021  : 1950  [x]  Patient  Verified  Payor: VA MEDICARE / Plan: VA MEDICARE PART A & B / Product Type: Medicare /    In time:7:45  Out time:8:38  Total Treatment Time (min): 48  Visit #: 1 of 8    Medicare/BCBS Only   Total Timed Codes (min):  43 1:1 Treatment Time:  43       Treatment Area: Right Achilles tendinitis [M76.61]    SUBJECTIVE  Pain Level (0-10 scale): 5/10  Any medication changes, allergies to medications, adverse drug reactions, diagnosis change, or new procedure performed?: [x] No    [] Yes (see summary sheet for update)  Subjective functional status/changes:   [] No changes reported  The patient reports no changes concerning her left achilles. OBJECTIVE  Modality rationale: decrease edema, decrease inflammation and decrease pain to improve the patients ability to improve ADL ease. Min Type Additional Details   10 [x]  Vasopneumatic Device    []  Right     [x]  Left  Pre-treatment girth:  Post-treatment girth:  Measured at (location):  Pressure:       [x] lo [] med [] hi   Temperature: [x] lo [] med [] hi   [] Skin assessment post-treatment:  []intact []redness- no adverse reaction    []redness - adverse reaction:     23 min Therapeutic Exercise:  [] See flow sheet :   Rationale: increase ROM and increase strength to improve the patients ability to improve ADL ease. 12 min Neuromuscular Re-education:  [x]  See flow sheet :   Rationale: improve coordination, improve balance and increase proprioception  to improve the patients ability to improve ADL ease. 8 min Manual Therapy:  MFR of gastroc and achilles with eccentric emphasis of PF work in prone utilizing green TB. The manual therapy interventions were performed at a separate and distinct time from the therapeutic activities interventions. Rationale: decrease pain, increase ROM and increase tissue extensibility to improve ADL ease. With   [] TE   [] TA   [] neuro   [] other: Patient Education: [x] Review HEP    [] Progressed/Changed HEP based on:   [] positioning   [] body mechanics   [] transfers   [] heat/ice application    [] other:      Other Objective/Functional Measures:   Able to progress to 1/2 FR in standing eccentric lowering. Pain Level (0-10 scale) post treatment: 5/10    ASSESSMENT/Changes in Function: The patient demonstrates slow progress regarding a decrease in pain, but does well regarding participation in clinic without apparent provocation. She remains compliant with HEP and has one additional visit scheduled. If no progress regarding pain levels, recommend D/C at next visit. Patient will continue to benefit from skilled PT services to modify and progress therapeutic interventions, address functional mobility deficits, address ROM deficits, address strength deficits, analyze and address soft tissue restrictions, analyze and cue movement patterns, analyze and modify body mechanics/ergonomics, assess and modify postural abnormalities and instruct in home and community integration to attain remaining goals. []  See Plan of Care  []  See progress note/recertification  []  See Discharge Summary         Progress towards goals / Updated goals:  Goals for this certification period to be accomplished in 4 weeks:               1. The patient will improve FOTO score to 59 to improve ease of chore production.              Re-cert:  Progressed to 52              2. The patient will demonstrate single leg heel raise full range to improve ease of ambulation efficiency during toe off of gait.             [id number][de-identified] Progressing - able to perform eccentric heel lower with single leg without significant increase in pain              3.  The patient will demonstrate 6\" step down void of compensations left LE to improve ease of stair negotiation.              Re-cert: progressing, initiated 4\" step downs without significant pain increase. 8/09/2021              4. The patient will improve gastroc flexibility to 10 degrees to improve ease of ambulation efficiency.              Re-cert: Progressing - 6 degrees 8/05/2021    PLAN  []  Upgrade activities as tolerated     [x]  Continue plan of care  []  Update interventions per flow sheet       []  Discharge due to:_  []  Other:_      Candis Sanchez PT 8/18/2021  7:58 AM    Future Appointments   Date Time Provider Meghna Moreno   8/24/2021  2:15 PM More Bucio, PT [facility]

## 2021-08-24 ENCOUNTER — HOSPITAL ENCOUNTER (OUTPATIENT)
Dept: PHYSICAL THERAPY | Age: 71
Discharge: HOME | End: 2021-08-24
Payer: MEDICARE

## 2021-08-24 PROCEDURE — 97112 NEUROMUSCULAR REEDUCATION: CPT

## 2021-08-24 PROCEDURE — 97140 MANUAL THERAPY 1/> REGIONS: CPT

## 2021-08-24 PROCEDURE — 97110 THERAPEUTIC EXERCISES: CPT

## 2021-08-24 NOTE — PROGRESS NOTES
PT DAILY TREATMENT NOTE     Patient Name: Valerio Peacock  Date:2021  : 1950  [x]  Patient  Verified  Payor: VA MEDICARE / Plan: VA MEDICARE PART A & B / Product Type: Medicare /    In time:2:15  Out time:3:00  Total Treatment Time (min): 45  Visit #: 2 of 8    Medicare/BCBS Only   Total Timed Codes (min):  45 1:1 Treatment Time:  45       Treatment Area: Right Achilles tendinitis [M76.61]    SUBJECTIVE  Pain Level (0-10 scale): 4/10  Any medication changes, allergies to medications, adverse drug reactions, diagnosis change, or new procedure performed?: [x] No    [] Yes (see summary sheet for update)  Subjective functional status/changes:   [] No changes reported  The patient presents today indicating that she does feel improvement in her achilles especially with standing and walking. OBJECTIVE  22 min Therapeutic Exercise:  [] See flow sheet :   Rationale: increase ROM and increase strength to improve the patients ability to improve ADL ease. 15 min Neuromuscular Re-education:  []  See flow sheet :   Rationale: improve coordination, improve balance and increase proprioception  to improve the patients ability to improve ADL ease. 8 min Manual Therapy:  MFR of gastroc and achilles with eccentric emphasis of PF work in prone utilizing green TB. The manual therapy interventions were performed at a separate and distinct time from the therapeutic activities interventions. Rationale: decrease pain, increase ROM and increase tissue extensibility to improve ADL ease. With   [] TE   [] TA   [] neuro   [] other: Patient Education: [x] Review HEP    [] Progressed/Changed HEP based on:   [] positioning   [] body mechanics   [] transfers   [] heat/ice application    [] other:      Other Objective/Functional Measures:   Able to progress by addition of retro on TM as a part of w/u. Added hip x 3 standing on incline for greater recruitment of gastroc during hip x 3.       Pain Level (0-10 scale) post treatment: 3/10    ASSESSMENT/Changes in Function: Opted to hold cryotherapy post session in order to limit suppression of chemical cascade and prostaglandin activity for further strengthening over achilles tendon. She left thanking PT and opted to continue treatment for further progressed strengthening. Patient will continue to benefit from skilled PT services to modify and progress therapeutic interventions, address functional mobility deficits, address ROM deficits, address strength deficits, analyze and address soft tissue restrictions, analyze and cue movement patterns, analyze and modify body mechanics/ergonomics, assess and modify postural abnormalities, address imbalance/dizziness and instruct in home and community integration to attain remaining goals. []  See Plan of Care  []  See progress note/recertification  []  See Discharge Summary         Progress towards goals / Updated goals:  Goals for this certification period to be accomplished in 4 weeks:               1. The patient will improve FOTO score to 59 to improve ease of chore production.              Re-cert:  Progressed to 52              2. The patient will demonstrate single leg heel raise full range to improve ease of ambulation efficiency during toe off of gait.             TG-OTCX[de-identified] Progressing - able to perform eccentric heel lower with single leg without significant increase in pain              3. The patient will demonstrate 6\" step down void of compensations left LE to improve ease of stair negotiation.              Re-cert: progressing, initiated 4\" step downs without significant pain increase. 8/09/2021              4. The patient will improve gastroc flexibility to 10 degrees to improve ease of ambulation efficiency.              Re-cert: Progressing - 6 degrees 8/05/2021    PLAN  []  Upgrade activities as tolerated     [x]  Continue plan of care  []  Update interventions per flow sheet       []  Discharge due to:_  []  Other:_      [facility], PT 8/24/2021  2:20 PM    No future appointments.

## 2021-09-13 ENCOUNTER — HOSPITAL ENCOUNTER (OUTPATIENT)
Dept: PHYSICAL THERAPY | Age: 71
Discharge: HOME | End: 2021-09-13
Payer: MEDICARE

## 2021-09-13 PROCEDURE — 97112 NEUROMUSCULAR REEDUCATION: CPT

## 2021-09-13 PROCEDURE — 97110 THERAPEUTIC EXERCISES: CPT

## 2021-09-13 NOTE — PROGRESS NOTES
PT DAILY TREATMENT NOTE     Patient Name: Yannick Guardado  Date:2021  : 1950  [x]  Patient  Verified  Payor: VA MEDICARE / Plan: VA MEDICARE PART A & B / Product Type: Medicare /    In time: 700  Out time: 934  Total Treatment Time (min): 43  Visit #: 3 of 8    Medicare/BCBS Only   Total Timed Codes (min):  43 1:1 Treatment Time:  43       Treatment Area: Right Achilles tendinitis [M76.61]    SUBJECTIVE  Pain Level (0-10 scale): 5  Any medication changes, allergies to medications, adverse drug reactions, diagnosis change, or new procedure performed?: [x] No    [] Yes (see summary sheet for update)  Subjective functional status/changes:   [] No changes reported  Patient reports she has been sticking around the \"4-5/10 range\" regarding pain over the last few weeks. OBJECTIVE    31 min Therapeutic Exercise:  [x] See flow sheet : + reassessment    Rationale: increase ROM, increase strength and improve coordination to improve the patients ability to perform ADLs and self care tasks with greater ease     12 min Neuromuscular Re-education:  [x]  See flow sheet : tandem walking, tandem balance on airex with hoop   Rationale: increase strength, improve coordination, improve balance and increase proprioception  to improve the patients ability to perform functional mobility with improved stabilization and control          With   [] TE   [] TA   [] neuro   [] other: Patient Education: [x] Review HEP    [] Progressed/Changed HEP based on:   [] positioning   [] body mechanics   [] transfers   [] heat/ice application    [] other:      Other Objective/Functional Measures: left gastroc flexibility - 10 degrees, progressed step downs to 6 inch step     Pain Level (0-10 scale) post treatment: 5    ASSESSMENT/Changes in Function: Patient presents for PN today. She reports minimal change since last progress note and is wondering if the pain she is still feeling is going to be her new \"normal\".  Patient making slow progress with tolerance to eccentric step downs as she was able to progress from 4 inch step to 6 inch step. She continues to have moderate pain in the achilles and lateral left ankle in weight bearing activities limiting her walking ~10 minutes. Patient did have 2 week break from therapy due to being OOT and scheduling difficulties. Despite break from therapy, she has met LTG 1 demonstrating improved overall mobility as measured by FOTO score and has met LTG 4 demonstrating improved gastroc flexibility. She would benefit from further skilled PT 2x/week for 3 more weeks to further progress strengthening and prepare for discharge to independent [facility]. Patient will continue to benefit from skilled PT services to modify and progress therapeutic interventions, address functional mobility deficits, address ROM deficits, address strength deficits, analyze and address soft tissue restrictions, analyze and cue movement patterns, analyze and modify body mechanics/ergonomics, assess and modify postural abnormalities, address imbalance/dizziness and instruct in home and community integration to attain remaining goals. []  See Plan of Care  [x]  See progress note/recertification  []  See Discharge Summary         Progress towards goals / Updated goals:  1. The patient will improve FOTO score to 59 to improve ease of chore production.              Re-cert:  Progressed to 52   Current: Met 9/13/2021 - 67    2. The patient will demonstrate single leg heel raise full range to improve ease of ambulation efficiency during toe off of gait.             Re-cert[de-identified] Progressing - able to perform eccentric  heel lower with single leg without significant increase in pain   Current: No change 9/13/2021 - able to perform eccentric heel lower single leg, unable to perform single leg heel raise due to increased pain  3.  The patient will demonstrate 6\" step down void of compensations left LE to improve ease of stair negotiation.              Re-cert: progressing, initiated 4\" step downs without significant pain increase. 8/09/2021   Current: slow progress 9/13/2021- initiated 6\" step down with mild increase in pain and decreased DF noted on left   4. The patient will improve gastroc flexibility to 10 degrees to improve ease of ambulation efficiency.              Re-cert: Progressing - 6 degrees 8/05/2021   Current: Met 9/13/2021     PLAN  [x]  Upgrade activities as tolerated     []  Continue plan of care  []  Update interventions per flow sheet       []  Discharge due to:_  []  Other:_      Jostin Smith, PT, DPT 9/13/2021  7:00 AM    Future Appointments   Date Time Provider Meghna Moreno   9/16/2021 10:30 AM [facility]   9/21/2021  9:00 AM Genevie Billing [facility]   9/23/2021 10:00 AM Genevie Billing [facility]   9/28/2021  9:00 AM [facility]   9/30/2021  9:15 AM Genevie Billing [facility]

## 2021-09-13 NOTE — PROGRESS NOTES
[facility]  [address]  [place], [address]  [phone] fax    Continued Plan of Care/ Re-certification for Physical Therapy Services    Patient name: Gayathri Narvaez Start of Care: 2021   Referral source: Jamey Quispe DPM : 1950   Medical/Treatment Diagnosis: Right Achilles tendinitis [M76.61]  Payor: [place] MEDICARE / Plan: VA MEDICARE PART A & B / Product Type: Medicare /  Onset Date:2021     Prior Hospitalization: see medical history Provider#: [id number]   Medications: Verified on Patient Summary List     Comorbidities: Arthritis, allergies   Prior Level of Function: The patient had pain with walking and was limited with stair negotiation. Visits from Start of Care: 9    Missed Visits: 0    The Plan of Care and following information is based on the patient's current status:  1. The patient will improve FOTO score to 59 to improve ease of chore production.              Re-cert:  Progressed to 52              Current: Met - 67    2. The patient will demonstrate single leg heel raise full range to improve ease of ambulation efficiency during toe off of gait.             Re-cert[de-identified] Progressing - able to perform eccentric  heel lower with single leg without significant increase in pain              Current: No change - able to perform eccentric heel lower single leg, unable to perform single leg heel raise due to increased pain  3. The patient will demonstrate 6\" step down void of compensations left LE to improve ease of stair negotiation.              Re-cert: progressing, initiated 4\" step downs without significant pain increase. 2021              Current: slow progress - initiated 6\" step down with mild increase in pain and decreased DF noted on left   4. The patient will improve gastroc flexibility to 10 degrees to improve ease of ambulation efficiency.              Re-cert: Progressing - 6 degrees 2021 Current: Met - 10 degrees     Key functional changes: improved functional mobility as measured by FOTO survey      Problems/ barriers to goal attainment: out of town     Problem List: pain affecting function, decrease ROM, decrease strength, impaired gait/ balance, decrease ADL/ functional abilitiies, decrease activity tolerance, decrease flexibility/ joint mobility and decrease transfer abilities    Treatment Plan: Therapeutic exercise, Therapeutic activities, Neuromuscular re-education, Physical agent/modality, Gait/balance training, Manual therapy, Aquatic therapy, Self Care training, Functional mobility training, Home safety training and Stair training     Patient Goal (s) has been updated and includes: \"ride my bike and walk\"    Goals for this certification period to be accomplished in 3 weeks:  1. The patient will demonstrate single leg heel raise full range to improve ease of ambulation efficiency during toe off of gait.             Re-cert: able to perform eccentric heel lower single leg, unable to perform single leg heel raise due to increased pain  2. The patient will demonstrate 6\" step down void of compensations left LE to improve ease of stair negotiation.              Re-cert: initiated 6\" step down with mild increase in pain and decreased DF noted on left     Frequency / Duration: Patient to be seen 2 times per week for 3 weeks:    Assessment / Recommendations Patient presents for PN today. She reports minimal change since last progress note and is wondering if the pain she is still feeling is going to be her new \"normal\". Patient making slow progress with tolerance to eccentric step downs as she was able to progress from 4 inch step to 6 inch step. She continues to have moderate pain in the achilles and lateral left ankle in weight bearing activities limiting her walking ~10 minutes. Patient did have 2 week break from therapy due to being OOT and scheduling difficulties.  Despite break from therapy, she has met LTG 1 demonstrating improved overall mobility as measured by FOTO score and has met LTG 4 demonstrating improved gastroc flexibility. She would benefit from further skilled PT 2x/week for 3 more weeks to further progress strengthening and prepare for discharge to independent [facility]. Certification Period: 9/14/2021 - 10/13/2021    Simon Maguire PT, DPT 9/13/2021 7:48 AM    ________________________________________________________________________  I certify that the above Therapy Services are being furnished while the patient is under my care. I agree with the treatment plan and certify that this therapy is necessary. [] I have read the above and request that my patient continue as recommended.   [] I have read the above report and request that my patient continue therapy with the following changes/special instructions: _____________________________________________  [] I have read the above report and request that my patient be discharged from therapy    Physician's Signature:____________Date:_________TIME:________     Chaitanya CATHY Gutiérrez  ** Signature, Date and Time must be completed for valid certification **    Please sign and return to [facility] [address] [place] [address] Jomar Sepulveda [address] [place] [address] [address]  [phone] fax

## 2021-09-16 ENCOUNTER — HOSPITAL ENCOUNTER (OUTPATIENT)
Dept: PHYSICAL THERAPY | Age: 71
Discharge: HOME | End: 2021-09-16
Payer: MEDICARE

## 2021-09-16 PROCEDURE — 97112 NEUROMUSCULAR REEDUCATION: CPT

## 2021-09-16 PROCEDURE — 97110 THERAPEUTIC EXERCISES: CPT

## 2021-09-16 NOTE — PROGRESS NOTES
PT DAILY TREATMENT NOTE     Patient Name: Yannick Guardado  Date:2021  : 1950  [x]  Patient  Verified  Payor: VA MEDICARE / Plan: VA MEDICARE PART A & B / Product Type: Medicare /    In time:10:28  Out time:11:00  Total Treatment Time (min): 32  Visit #: 1 of 6     Medicare/BCBS Only   Total Timed Codes (min):  32 1:1 Treatment Time:  32       Treatment Area: Right Achilles tendinitis [M76.61]    SUBJECTIVE  Pain Level (0-10 scale): 4/10  Any medication changes, allergies to medications, adverse drug reactions, diagnosis change, or new procedure performed?: [x] No    [] Yes (see summary sheet for update)  Subjective functional status/changes:   [] No changes reported  The patient reports having some what of a \"new\" pain in her lateral ankle today. Over all, pain is lower at a 4/10, however. She reports she awoke with it \"this morning\". OBJECTIVE  17 min Therapeutic Exercise:  [] See flow sheet :   Rationale: increase ROM, increase strength and improve coordination to improve the patients ability to improve ADL ease. 15 min Neuromuscular Re-education:  []  See flow sheet :   Rationale: improve coordination, improve balance and increase proprioception  to improve the patients ability to improve ADL ease. With   [] TE   [] TA   [] neuro   [] other: Patient Education: [x] Review HEP    [] Progressed/Changed HEP based on:   [] positioning   [] body mechanics   [] transfers   [] heat/ice application    [] other:      Other Objective/Functional Measures: Added a set of eccentric heel lowing with 1/2 FR at 75% WB to good effect without increase in pain. Pain Level (0-10 scale) post treatment: 3/10    ASSESSMENT/Changes in Function: The patient was able to progress into 75% weight bearing upon eccentric lowering of 1/2 FR today. Her lateral ankle pain was abolished post session, with discomfort cited through achilles (low level, and less than arrival) at end of session.  She is in agreement with plan, thoroughly educated regarding rationale and how this pertains to the condition. She leaves with all questions answered in no apparent distress. Patient will continue to benefit from skilled PT services to modify and progress therapeutic interventions, address functional mobility deficits, address ROM deficits, address strength deficits, analyze and address soft tissue restrictions, analyze and cue movement patterns, analyze and modify body mechanics/ergonomics, assess and modify postural abnormalities and instruct in home and community integration to attain remaining goals. []  See Plan of Care  []  See progress note/recertification  []  See Discharge Summary         Progress towards goals / Updated goals:  Goals for this certification period to be accomplished in 3 weeks:  1. The patient will demonstrate single leg heel raise full range to improve ease of ambulation efficiency during toe off of gait.             Re-cert: able to perform eccentric heel lower single leg, unable to perform single leg heel raise due to increased pain  2.  The patient will demonstrate 6\" step down void of compensations left LE to improve ease of stair negotiation.              Re-cert: initiated 6\" step down with mild increase in pain and decreased DF noted on left    Current: Performed step downs, but not complete range 9/16/2021     PLAN  []  Upgrade activities as tolerated     [x]  Continue plan of care  []  Update interventions per flow sheet       []  Discharge due to:_  []  Other:_      Jayjay Urrutia PT 9/16/2021  10:32 AM    Future Appointments   Date Time Provider Meghna Moreno   9/21/2021  9:00 AM [facility]   9/23/2021 10:00 AM Moore Mode De Faire [facility]   9/28/2021  9:00 AM Lorna Thurston PT [facility]   9/30/2021  9:15 AM Veronica Hassan [facility]

## 2021-09-21 ENCOUNTER — HOSPITAL ENCOUNTER (OUTPATIENT)
Dept: PHYSICAL THERAPY | Age: 71
Discharge: HOME | End: 2021-09-21
Payer: MEDICARE

## 2021-09-21 PROCEDURE — 97110 THERAPEUTIC EXERCISES: CPT

## 2021-09-21 PROCEDURE — 97112 NEUROMUSCULAR REEDUCATION: CPT

## 2021-09-21 NOTE — PROGRESS NOTES
PT DAILY TREATMENT NOTE     Patient Name: Skye Gama  Date:2021  : 1950  [x]  Patient  Verified  Payor: VA MEDICARE / Plan: VA MEDICARE PART A & B / Product Type: Medicare /    In time:900  Out time:945  Total Treatment Time (min): 45  Visit #: 2 of 6    Medicare/BCBS Only   Total Timed Codes (min):  35 1:1 Treatment Time:  35       Treatment Area: Right Achilles tendinitis [M76.61]    SUBJECTIVE  Pain Level (0-10 scale): 3  Any medication changes, allergies to medications, adverse drug reactions, diagnosis change, or new procedure performed?: [x] No    [] Yes (see summary sheet for update)  Subjective functional status/changes:   [] No changes reported  Patient reports she saw the podiatrist last Thursday reporting he gave her an injection to her foot that brought her pain down slightly.      OBJECTIVE    Modality rationale: decrease edema, decrease inflammation and decrease pain to improve the patients ability to perform functional tasks with greater ease    Min Type Additional Details    [] Estim:  []Unatt       []IFC  []Premod                        []Other:  []w/ice   []w/heat  Position:  Location:    [] Estim: []Att    []TENS instruct  []NMES                    []Other:  []w/US   []w/ice   []w/heat  Position:  Location:    []  Traction: [] Cervical       []Lumbar                       [] Prone          []Supine                       []Intermittent   []Continuous Lbs:  [] before manual  [] after manual    []  Ultrasound: []Continuous   [] Pulsed                           []1MHz   []3MHz W/cm2:  Location:    []  Iontophoresis with dexamethasone         Location: [] Take home patch   [] In clinic    []  Ice     []  heat  []  Ice massage  []  Laser   []  Anodyne Position:  Location:    []  Laser with stim  []  Other:  Position:  Location:   10 [x]  Vasopneumatic Device    []  Right     [x]  Left  Pre-treatment girth:  Post-treatment girth:  Measured at (location):  Pressure:       [x] lo [] med [] hi   Temperature: [x] lo [] med [] hi   [] Skin assessment post-treatment:  []intact []redness- no adverse reaction    []redness  adverse reaction:     21 min Therapeutic Exercise:  [x] See flow sheet :   Rationale: increase ROM, increase strength and improve coordination to improve the patients ability to perform ADLs and self care tasks with    14 min Neuromuscular Re-education:  [x]  See flow sheet : tandem balance with ball toss on airex, tandem balance on 1/2 FR, tandem walking, eccentric heel raise   Rationale: increase strength, improve coordination, improve balance and increase proprioception  to improve the patients ability to perform functional mobility with improved stabilization and control            With   [] TE   [] TA   [] neuro   [] other: Patient Education: [x] Review HEP    [] Progressed/Changed HEP based on:   [] positioning   [] body mechanics   [] transfers   [] heat/ice application    [] other:      Other Objective/Functional Measures: added tandem balance on 1/2 FR      Pain Level (0-10 scale) post treatment: 2    ASSESSMENT/Changes in Function: Patient reporting mild discomfort in left achilles with eccentric heel raises which subsided at completion of activity. Challenged with progression of tandem balance on 1/2 FR with several UE assist required to prevent LOB. Patient asking about benefits of massage with education provided on emphasis of strengthening/stabilization through the left ankle/foot at this time. Patient requesting use of ice machine post-session today. Continue progressing dynamic balance, ankle stabilization, and LE strengthening as tolerated to improve ease with daily activities.      Patient will continue to benefit from skilled PT services to modify and progress therapeutic interventions, address functional mobility deficits, address ROM deficits, address strength deficits, analyze and address soft tissue restrictions, analyze and cue movement patterns, analyze and modify body mechanics/ergonomics, assess and modify postural abnormalities, address imbalance/dizziness and instruct in home and community integration to attain remaining goals. []  See Plan of Care  []  See progress note/recertification  []  See Discharge Summary         Progress towards goals / Updated goals:  Goals for this certification period to be accomplished in 3 weeks:  1. The patient will demonstrate single leg heel raise full range to improve ease of ambulation efficiency during toe off of gait.             Re-cert: able to perform eccentric heel lower single leg, unable to perform single leg heel raise due to increased pain   Current: no change 9/21/2021 - continue to report inability to complete single leg heel raise   2.  The patient will demonstrate 6\" step down void of compensations left LE to improve ease of stair negotiation.              Re-cert: initiated 6\" step down with mild increase in pain and decreased DF noted on left               Current: Performed step downs, but not complete range 9/16/2021    PLAN  []  Upgrade activities as tolerated     [x]  Continue plan of care  []  Update interventions per flow sheet       []  Discharge due to:_  []  Other:_      Diane Mckeon PT, DPT 9/21/2021  9:03 AM    Future Appointments   Date Time Provider Meghna Moreno   9/23/2021 10:00 AM Gary [facility]   9/28/2021  9[facility]   9/30/2021  9:15 [facility]

## 2021-09-23 ENCOUNTER — HOSPITAL ENCOUNTER (OUTPATIENT)
Dept: PHYSICAL THERAPY | Age: 71
Discharge: HOME | End: 2021-09-23
Payer: MEDICARE

## 2021-09-23 PROCEDURE — 97110 THERAPEUTIC EXERCISES: CPT

## 2021-09-23 PROCEDURE — 97112 NEUROMUSCULAR REEDUCATION: CPT

## 2021-09-23 NOTE — PROGRESS NOTES
PT DAILY TREATMENT NOTE     Patient Name: Valerio Peacock  Date:2021  : 1950  [x]  Patient  Verified  Payor: VA MEDICARE / Plan: VA MEDICARE PART A & B / Product Type: Medicare /    In time: 1000  Out time: 1030  Total Treatment Time (min): 30  Visit #: 3 of 6    Medicare/BCBS Only   Total Timed Codes (min):  30 1:1 Treatment Time:  30       Treatment Area: Right Achilles tendinitis [M76.61]    SUBJECTIVE  Pain Level (0-10 scale): 4  Any medication changes, allergies to medications, adverse drug reactions, diagnosis change, or new procedure performed?: [x] No    [] Yes (see summary sheet for update)  Subjective functional status/changes:   [] No changes reported  Patient reports she attempted riding her bike in \"a [place] about 3-4x\" reporting it was okay. Patient reports more aching noted today.      OBJECTIVE    14 min Therapeutic Exercise:  [x] See flow sheet :   Rationale: increase ROM, increase strength and improve coordination to improve the patients ability to perform ADLs and self care tasks with greater ease     16 min Neuromuscular Re-education:  [x]  See flow sheet : tandem balance on on  FR, tandem balance on airex with ball toss, airex tandem walking, eccentric step downs    Rationale: increase strength, improve coordination, improve balance and increase proprioception  to improve the patients ability to perform functional tasks with improved stabilization, balance, and control         With   [] TE   [] TA   [] neuro   [] other: Patient Education: [x] Review HEP    [] Progressed/Changed HEP based on:   [] positioning   [] body mechanics   [] transfers   [] heat/ice application    [] other:      Other Objective/Functional Measures: progressed 3 way hip resistance to green mini band, added tandem walking on airex pad, increased repetitions with dynamic step ups on 6 inch box today      Pain Level (0-10 scale) post treatment: 2    ASSESSMENT/Changes in Function: Despite patient having slightly increased pain today, she was able to complete exercises as prescribed. Good tolerance to addition of tandem walking on airex beam with minimal use of UE support to prevent LOB. Patient leaving with less pain post-session and was encouraged to continue progressing bike riding as tolerated to return to PLOF. Patient with no further questions or concerns at this time. Patient will continue to benefit from skilled PT services to modify and progress therapeutic interventions, address functional mobility deficits, address ROM deficits, address strength deficits, analyze and address soft tissue restrictions, analyze and cue movement patterns, analyze and modify body mechanics/ergonomics, assess and modify postural abnormalities, address imbalance/dizziness and instruct in home and community integration to attain remaining goals. []  See Plan of Care  []  See progress note/recertification  []  See Discharge Summary         Progress towards goals / Updated goals:  Goals for this certification period to be accomplished in 3 weeks:  1. The patient will demonstrate single leg heel raise full range to improve ease of ambulation efficiency during toe off of gait.             Re-cert: able to perform eccentric heel lower single leg, unable to perform single leg heel raise due to increased pain              Current: no change 9/21/2021 - continue to report inability to complete single leg heel raise   2.  The patient will demonstrate 6\" step down void of compensations left LE to improve ease of stair negotiation.              Re-cert: initiated 6\" step down with mild increase in pain and decreased DF noted on left               Current: Performed step downs, but not complete range 9/16/2021    PLAN  []  Upgrade activities as tolerated     []  Continue plan of care  []  Update interventions per flow sheet       []  Discharge due to:_  []  Other:_      Mony Vance, PT, DPT  9/23/2021  10:03 AM    Future Appointments   Date Time Provider Meghna Moreno   9/28/2021  9:00 [facility]   9/30/2021  9:15 AM [facility]

## 2021-09-28 ENCOUNTER — HOSPITAL ENCOUNTER (OUTPATIENT)
Dept: PHYSICAL THERAPY | Age: 71
Discharge: HOME | End: 2021-09-28
Payer: MEDICARE

## 2021-09-28 PROCEDURE — 97112 NEUROMUSCULAR REEDUCATION: CPT

## 2021-09-28 PROCEDURE — 97110 THERAPEUTIC EXERCISES: CPT

## 2021-09-28 NOTE — PROGRESS NOTES
PT DAILY TREATMENT NOTE     Patient Name: Seth Carballo  Date:2021  : 1950  [x]  Patient  Verified  Payor: VA MEDICARE / Plan: VA MEDICARE PART A & B / Product Type: Medicare /    In time:9:04  Out time:9:45  Total Treatment Time (min): 41  Visit #: 4 of 6    Medicare/BCBS Only   Total Timed Codes (min):  41 1:1 Treatment Time:  41       Treatment Area: Right Achilles tendinitis [M76.61]    SUBJECTIVE  Pain Level (0-10 scale): 3/10  Any medication changes, allergies to medications, adverse drug reactions, diagnosis change, or new procedure performed?: [x] No    [] Yes (see summary sheet for update)  Subjective functional status/changes:   [] No changes reported  The patient reports that her pain is about the same. OBJECTIVE   31 min Therapeutic Exercise:  [x] See flow sheet :   Rationale: increase ROM and increase strength to improve the patients ability to improve ADL ease. 10 min Neuromuscular Re-education:  [x]  See flow sheet :   Rationale: improve coordination, improve balance and increase proprioception  to improve the patients ability to improve ADL ease. With   [] TE   [] TA   [] neuro   [] other: Patient Education: [x] Review HEP    [] Progressed/Changed HEP based on:   [] positioning   [] body mechanics   [] transfers   [] heat/ice application    [] other:      Other Objective/Functional Measures:   Partial range of motion single leg heel raise. Pain Level (0-10 scale) post treatment: 2/10    ASSESSMENT/Changes in Function: Good progress with pain control. The patient is very near independence with exercises in clinic. She is in agreement with D/C to HEP for self management following next session.      Patient will continue to benefit from skilled PT services to modify and progress therapeutic interventions, address functional mobility deficits, address ROM deficits, address strength deficits, analyze and address soft tissue restrictions, analyze and cue movement patterns, analyze and modify body mechanics/ergonomics, assess and modify postural abnormalities and instruct in home and community integration to attain remaining goals. []  See Plan of Care  []  See progress note/recertification  []  See Discharge Summary         Progress towards goals / Updated goals:  Goals for this certification period to be accomplished in 3 weeks:  1. The patient will demonstrate single leg heel raise full range to improve ease of ambulation efficiency during toe off of gait.             Re-cert: able to perform eccentric heel lower single leg, unable to perform single leg heel raise due to increased pain              Current: Partial met with partial range of motion through single leg heel raise, citing pain medially -  9/28/2021  2.  The patient will demonstrate 6\" step down void of compensations left LE to improve ease of stair negotiation.              Re-cert: initiated 6\" step down with mild increase in pain and decreased DF noted on left                Current: Met - appreciated full step downs without compensations 9/28/2021    PLAN  []  Upgrade activities as tolerated     [x]  Continue plan of care    Kendra Kwok, PT 9/28/2021  9:09 AM    Future Appointments   Date Time Provider Meghna Moreno   9/30/2021  9:15 AM [facility]

## 2021-09-30 ENCOUNTER — HOSPITAL ENCOUNTER (OUTPATIENT)
Dept: PHYSICAL THERAPY | Age: 71
Discharge: HOME | End: 2021-09-30
Payer: MEDICARE

## 2021-09-30 PROCEDURE — 97112 NEUROMUSCULAR REEDUCATION: CPT

## 2021-09-30 PROCEDURE — 97110 THERAPEUTIC EXERCISES: CPT

## 2021-09-30 NOTE — PROGRESS NOTES
[facility]  [address]  [place], [address]  [phone] fax    Physical Therapy Discharge Summary  Patient name: Adrianna Melendrez Start of Care: 2021   Referral source: Wilmer Alaniz DPM : 1950   Medical/Treatment Diagnosis: Right Achilles tendinitis [M76.61]  Payor: [place] MEDICARE / Plan: [place] MEDICARE PART A & B / Product Type: Medicare /  Onset Date:2021     Prior Hospitalization: see medical history Provider#: [id number]   Medications: Verified on Patient Summary List    Comorbidities: Arthritis, allergies   Prior Level of Function: The patient had pain with walking and was limited with stair negotiation  Visits from Start of Care: 14    Missed Visits: 0  Reporting Period : 2021 to 2021      Summary of Care:  1. The patient will demonstrate single leg heel raise full range to improve ease of ambulation efficiency during toe off of gait.             Re-cert: able to perform eccentric heel lower single leg, unable to perform single leg heel raise due to increased pain              Current: Partial met with partial range of motion through single leg heel raise, citing pain medially -   2. The patient will demonstrate 6\" step down void of compensations left LE to improve ease of stair negotiation.              Re-cert: initiated 6\" step down with mild increase in pain and decreased DF noted on left                Current: Met - appreciated full step downs without compensations       ASSESSMENT/RECOMMENDATIONS: Patient presents for last visit under current plan of care. Patient has made considerable progress since start of care demonstrating improved stabilization, left ankle strength, and improved balance. Patient continues to have mild discomfort through the left ankle at times. She is at a point where she would be able to continue progressing towards long term goals independently. Patient agreeable to discharge today.  She was provided an updated HEP today with appropriate theraband to continue progressing independently. Patient with no further questions or concerns at this time.  Thank you for this referral.     [x]Discontinue therapy: [x]Patient has reached or is progressing toward set goals      []Patient is non-compliant or has abdicated      []Due to lack of appreciable progress towards set goals    Sherry Small, PT, DPT 9/30/2021 9:30 AM

## 2021-09-30 NOTE — PROGRESS NOTES
PT DAILY TREATMENT NOTE     Patient Name: Miko Golden  Date:2021  : 1950  [x]  Patient  Verified  Payor: [place] MEDICARE / Plan: VA MEDICARE PART A & B / Product Type: Medicare /    In time: 263  Out time:951  Total Treatment Time (min): 34  Visit #: 5 of 6    Medicare/BCBS Only   Total Timed Codes (min):  34 1:1 Treatment Time:  34       Treatment Area: Right Achilles tendinitis [M76.61]    SUBJECTIVE  Pain Level (0-10 scale): 4  Any medication changes, allergies to medications, adverse drug reactions, diagnosis change, or new procedure performed?: [x] No    [] Yes (see summary sheet for update)  Subjective functional status/changes:   [] No changes reported  \"It's the same. \"      OBJECTIVE    21 min Therapeutic Exercise:  [x] See flow sheet:   Rationale: increase ROM, increase strength and improve coordination to improve the patients ability to perform ADLs and self care tasks with greater ease     13 min Neuromuscular Re-education:  [x]  See flow sheet : tandem walking, tandem balance on airex, dynamic step ups on airex, eccentric lowering    Rationale: increase strength, improve coordination, improve balance and increase proprioception  to improve the patients ability to perform functional tasks with improved ease           With   [] TE   [] TA   [] neuro   [] other: Patient Education: [x] Review HEP    [] Progressed/Changed HEP based on:   [] positioning   [] body mechanics   [] transfers   [] heat/ice application    [] other:      Other Objective/Functional Measures: review of discharge     Pain Level (0-10 scale) post treatment: 2    ASSESSMENT/Changes in Function: Patient presents for last visit under current plan of care. Patient has made considerable progress since start of care demonstrating improved stabilization, left ankle strength, and improved balance. Patient continues to have mild discomfort through the left ankle at times.  She is at a point where she would be able to continue progressing towards long term goals independently. Patient agreeable to discharge today. She was provided an updated HEP today with appropriate theraband to continue progressing independently. Patient with no further questions or concerns at this time. Thank you for this referral.     []  See Plan of Care  []  See progress note/recertification  [x]  See Discharge Summary         Progress towards goals / Updated goals:  Goals for this certification period to be accomplished in 3 weeks:  1. The patient will demonstrate single leg heel raise full range to improve ease of ambulation efficiency during toe off of gait. Re-cert: able to perform eccentric heel lower single leg, unable to perform single leg heel raise due to increased pain              Current: Partial met with partial range of motion through single leg heel raise, citing pain medially -  9/28/2021  2. The patient will demonstrate 6\" step down void of compensations left LE to improve ease of stair negotiation. Re-cert: initiated 6\" step down with mild increase in pain and decreased DF noted on left                Current: Met - appreciated full step downs without compensations 9/28/2021    PLAN  []  Upgrade activities as tolerated     []  Continue plan of care  []  Update interventions per flow sheet       [x]  Discharge due to: progressing towards long term goals   []  Other:_      Sidney Lange, PT, DPT 9/30/2021  9:21 AM    No future appointments.

## 2021-09-30 NOTE — PROGRESS NOTES
Physical Therapy Discharge Instructions      [facility]  [address]  [place], [address]  [phone] fax    Patient: Feliciano Boswell  : 1950      Continue Home Exercise Program 1-2 times per day for 2 weeks, then decrease to 3-4 times per week      Continue with    [x] Ice  as needed      [] Heat           Follow up with MD:     [] Upon completion of therapy     [x] As needed      Recommendations:     [x]   Return to activity with home program    []   Return to activity with the following modifications:       []Post Rehab Program    []Join Independent aquatic program     []Return to/join local gym        Reuben Shi PT, DPT 2021 9:33 AM

## 2022-05-23 ENCOUNTER — HOSPITAL ENCOUNTER (OUTPATIENT)
Dept: PHYSICAL THERAPY | Age: 72
Discharge: HOME | End: 2022-05-23
Payer: MEDICARE

## 2022-05-23 PROCEDURE — 97535 SELF CARE MNGMENT TRAINING: CPT

## 2022-05-23 PROCEDURE — 97110 THERAPEUTIC EXERCISES: CPT

## 2022-05-23 PROCEDURE — 97161 PT EVAL LOW COMPLEX 20 MIN: CPT

## 2022-05-23 NOTE — PROGRESS NOTES
PT DAILY TREATMENT NOTE     Patient Name: Bridget Arnold  Date:2022  : 1950  [x]  Patient  Verified  Payor: VA MEDICARE / Plan: VA MEDICARE PART A & B / Product Type: Medicare /    In time:745am  Out time:825am  Total Treatment Time (min): 40  Visit #: 1 of 8    Medicare/BCBS Only   Total Timed Codes (min):  23 1:1 Treatment Time:  40       Treatment Area: Neck pain [M54.2]    SUBJECTIVE  Pain Level (0-10 scale): 4  Any medication changes, allergies to medications, adverse drug reactions, diagnosis change, or new procedure performed?: [x] No    [] Yes (see summary sheet for update)  Subjective functional status/changes:   [] No changes reported  See evaluation and POC    OBJECTIVE    17 min [x]Eval                  []Re-Eval        13 min Therapeutic Exercise:  [] See flow sheet : HEP   Rationale: increase ROM, increase strength and improve coordination to improve the patients ability to manage ADLs. 10 min Self care/home management:  []  See flow sheet : pt education on desk/computer posture, exercise routine   Rationale: improve postural awareness  to improve the patients ability to manage self care. With   [] TE   [] TA   [] neuro   [] other: Patient Education: [x] Review HEP    [] Progressed/Changed HEP based on:   [] positioning   [] body mechanics   [] transfers   [] heat/ice application    [] other:      Other Objective/Functional Measures: see POC and evaluation     Pain Level (0-10 scale) post treatment: 4    ASSESSMENT/Changes in Function: Pt is a 28-year-old woman presenting to the clinic with c/o neck pain x a few months with onset of right UE radiculopathy from shoulder to wrist x 3 days 1 month ago that woke her from sleep. Radicular pains included throbbing. Pt reports intermittent right shoulder blade pain superior-medially. Demonstrates fair posture in clinic, but reports her desktop monitor does make her look down a bit.  Pain is now localized to the lower c/s and upper t/s. Pain increased with c/s ROM in all directions except flexion. Maintains full c/s ROM. Impairments include reduced scapular stability & strength, reduced T/S mobility, soft tissue restrictions right > left neck musculature, and reduced postural awareness. Signs and sx consistent with mechanical neck pain. Pt will benefit from skilled PT services to address the aforementioned impairments and improve ease of ADLs. Patient will continue to benefit from skilled PT services to modify and progress therapeutic interventions, address functional mobility deficits, address ROM deficits, address strength deficits, analyze and address soft tissue restrictions, analyze and cue movement patterns, analyze and modify body mechanics/ergonomics and assess and modify postural abnormalities to attain remaining goals. []  See Plan of Care  []  See progress note/recertification  []  See Discharge Summary         Progress towards goals / Updated goals:  Short Term Goals: To be accomplished in 2 weeks:  1. Pt will report daily compliance with HEP to reduce pain. Eval: HEP assigned  Long Term Goals: To be accomplished in 4 weeks:  1. Pt will improve B lower trapezius strength to 4/5 MMT to improve postural stability for her exercise routine. Eval: 3+/5  2. Pt will improve B middle trapezius strength to 4+/5 MMT to reduce pain with computer work. Eval: right 3+/5, left 4-/5  3. Pt will improve her B deltoid strength to 4+/5 MMT without pain to improve shoulder stability for exercise and overhead ADLs. Eval: 4/5 with pain right shoulder  4. Pt will report 75% improvement in pain sx to improve tolerance to self care. Eval: 0  5. Pt will improve her FOTO to 70, demonstrating improved functional ADL capacity.     Eval: 61    PLAN  []  Upgrade activities as tolerated     [x]  Continue plan of care  []  Update interventions per flow sheet       []  Discharge due to:_  []  Other:_      Tama Rinne, PT 5/23/2022  8:29 AM    No future appointments.

## 2022-05-23 NOTE — PROGRESS NOTES
[facility]  [address]  [place], [address]  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 The Plan of Care and following information is based on the information from the initial evaluation. Assessment/ key information: Pt is a 76-year-old woman presenting to the clinic with c/o neck pain x a few months with onset of right UE radiculopathy from shoulder to wrist x 3 days 1 month ago that woke her from sleep. Radicular pains included throbbing. Pt reports intermittent right shoulder blade pain superior-medially. Demonstrates fair posture in clinic, but reports her desktop monitor does make her look down a bit. Pain is now localized to the lower c/s and upper t/s. Pain increased with c/s ROM in all directions except flexion. Maintains full c/s ROM. Impairments include reduced scapular stability & strength, reduced T/S mobility, soft tissue restrictions right > left neck musculature, and reduced postural awareness. Signs and sx consistent with mechanical neck pain. Pt will benefit from skilled PT services to address the aforementioned impairments and improve ease of ADLs.     Evaluation Complexity History MEDIUM  Complexity : 1-2 comorbidities / personal factors will impact the outcome/ POC ; Examination MEDIUM Complexity : 3 Standardized tests and measures addressing body structure, function, activity limitation and / or participation in recreation  ;Presentation LOW Complexity : Stable, uncomplicated  ;Clinical Decision Making MEDIUM Complexity : FOTO score of 26-74  Overall Complexity Rating: LOW   Problem List: pain affecting function, decrease ROM, decrease strength, decrease ADL/ functional abilitiies, decrease activity tolerance and decrease flexibility/ joint mobility   Treatment Plan may include any combination of the following: Therapeutic exercise, Therapeutic activities, Neuromuscular re-education, Physical agent/modality, Manual therapy, Patient education, Self Care training and Functional mobility training  Patient / Family readiness to learn indicated by: asking questions, trying to perform skills and interest  Persons(s) to be included in education: patient (P)  Barriers to Learning/Limitations: None  Patient Goal (s): elimination of pain  Patient Self Reported Health Status: good  Rehabilitation Potential: good    Short Term Goals: To be accomplished in 2 weeks:  1. Pt will report daily compliance with HEP to reduce pain. Long Term Goals: To be accomplished in 4 weeks:  1. Pt will improve B lower trapezius strength to 4/5 MMT to improve postural stability for her exercise routine. 2. Pt will improve B middle trapezius strength to 4+/5 MMT to reduce pain with computer work. 3. Pt will improve her B deltoid strength to 4+/5 MMT without pain to improve shoulder stability for exercise and overhead ADLs. 4. Pt will report 75% improvement in pain sx to improve tolerance to self care. 5. Pt will improve her FOTO to 70, demonstrating improved functional ADL capacity. Frequency / Duration: Patient to be seen 2 times per week for 4 weeks.     Patient/ Caregiver education and instruction: Diagnosis, prognosis, self care, activity modification and exercises   [x]  Plan of care has been reviewed with ANNAMARIA De Santiago, PT 5/23/2022 8:47 AM    ________________________________________________________________________    I certify that the above Therapy Services are being furnished while the patient is under my care. I agree with the treatment plan and certify that this therapy is necessary.     [address] [place] [address] Signature:____________Date:_________TIME:________     Drake Zambrano MD  ** Signature, Date and Time must be completed for valid certification **    Please sign and return to [facility] [address]  [address] [place] [address], [facility] [address]  [phone] fax

## 2022-05-27 ENCOUNTER — HOSPITAL ENCOUNTER (OUTPATIENT)
Dept: PHYSICAL THERAPY | Age: 72
Discharge: HOME | End: 2022-05-27
Payer: MEDICARE

## 2022-05-27 PROCEDURE — 97110 THERAPEUTIC EXERCISES: CPT

## 2022-05-27 PROCEDURE — 97140 MANUAL THERAPY 1/> REGIONS: CPT

## 2022-05-27 PROCEDURE — 97112 NEUROMUSCULAR REEDUCATION: CPT

## 2022-05-27 NOTE — PROGRESS NOTES
PT DAILY TREATMENT NOTE     Patient Name: Carissa Jimenez  Date:2022  : 1950  [x]  Patient  Verified  Payor: VA MEDICARE / Plan: VA MEDICARE PART A & B / Product Type: Medicare /    In time:516pm (525pm w/ therapist)  Out time:612pm  Total Treatment Time (min): 64  Visit #: 2 of 8     Medicare/BCBS Only   Total Timed Codes (min):  56 1:1 Treatment Time:  47       Treatment Area: Neck pain [M54.2]    SUBJECTIVE  Pain Level (0-10 scale): 3   Any medication changes, allergies to medications, adverse drug reactions, diagnosis change, or new procedure performed?: [x] No    [] Yes (see summary sheet for update)  Subjective functional status/changes:   [] No changes reported  Reports stiffness in the neck. Has performed exercises \"some. \"    OBJECTIVE    11 min Therapeutic Exercise:  [x] See flow sheet :   Rationale: increase ROM and increase strength to improve the patients ability to manage ADLs with reduced neck pain. 25 min Neuromuscular Re-education:  [x]  See flow sheet :   Rationale: increase strength, improve coordination and increase proprioception  to improve the patients ability to reach overhead and lift with reduced overuse of superficial cervical muscles and improved scapular control. 10 min Manual Therapy: In prone, performed GR II PA's to mid thoracic spine through C7. In supine with leg wedge,  STM B scalenes, UT, LS -- c/s sideglides and downglides B   The manual therapy interventions were performed at a separate and distinct time from the therapeutic activities interventions. Rationale: decrease pain, increase ROM and increase tissue extensibility to reduce stiffness and improve ease of turning neck for driving.            With   [] TE   [] TA   [] neuro   [] other: Patient Education: [x] Review HEP    [] Progressed/Changed HEP based on:   [] positioning   [] body mechanics   [] transfers   [] heat/ice application    [] other:      Other Objective/Functional Measures: exercises initiated for first f/u per flowsheet     Pain Level (0-10 scale) post treatment: 3    ASSESSMENT/Changes in Function: Pt feels like the muscles in her neck were \"worked\" so there is no change in pain following therapy. Will more closely monitor neck posture and strain with exercise to ensure improved response to next session. Patient will continue to benefit from skilled PT services to modify and progress therapeutic interventions, address functional mobility deficits, address ROM deficits, address strength deficits, analyze and address soft tissue restrictions, analyze and cue movement patterns, analyze and modify body mechanics/ergonomics and assess and modify postural abnormalities to attain remaining goals. []  See Plan of Care  []  See progress note/recertification  []  See Discharge Summary         Progress towards goals / Updated goals:  Short Term Goals: To be accomplished in 2 weeks:  1. Pt will report daily compliance with HEP to reduce pain. Eval: HEP assigned    Current: has tried \"some\" (5/27/2022)  Long Term Goals: To be accomplished in 4 weeks:  1. Pt will improve B lower trapezius strength to 4/5 MMT to improve postural stability for her exercise routine. Eval: 3+/5  2. Pt will improve B middle trapezius strength to 4+/5 MMT to reduce pain with computer work. Eval: right 3+/5, left 4-/5  3. Pt will improve her B deltoid strength to 4+/5 MMT without pain to improve shoulder stability for exercise and overhead ADLs. Eval: 4/5 with pain right shoulder  4. Pt will report 75% improvement in pain sx to improve tolerance to self care. Eval: 0  5. Pt will improve her FOTO to 70, demonstrating improved functional ADL capacity.                Eval: 61    PLAN  []  Upgrade activities as tolerated     [x]  Continue plan of care  []  Update interventions per flow sheet       []  Discharge due to:_  []  Other:_      Fred Snowden PT 5/27/2022  5:30 PM    Future Appointments   Date Time Provider Meghna Moreno   5/31/2022  3:45 PM Elex Iron, PT [facility]   6/2/2022 10:45 AM De Niece, PTA [facility]   6/6/2022  1:45 PM Elex Iron, PT [facility]   6/8/2022  9:45 AM De Niece, PTA [facility]   6/13/2022  8:30 AM Elex Iron, PT [facility]   6/16/2022  9:15 AM Elex Iron, PT [facility]

## 2022-05-31 ENCOUNTER — HOSPITAL ENCOUNTER (OUTPATIENT)
Dept: PHYSICAL THERAPY | Age: 72
Discharge: HOME | End: 2022-05-31
Payer: MEDICARE

## 2022-05-31 PROCEDURE — 97110 THERAPEUTIC EXERCISES: CPT

## 2022-05-31 PROCEDURE — 97140 MANUAL THERAPY 1/> REGIONS: CPT

## 2022-05-31 PROCEDURE — 97112 NEUROMUSCULAR REEDUCATION: CPT

## 2022-05-31 NOTE — PROGRESS NOTES
PT DAILY TREATMENT NOTE     Patient Name: Pushpa Pierre  Date:2022  : 1950  [x]  Patient  Verified  Payor: Michael Perry / Plan: [place] MEDICARE PART A & B / Product Type: Medicare /    In time:349pm  Out time:445pm  Total Treatment Time (min): 64  Visit #: 3 of 8    Medicare/BCBS Only   Total Timed Codes (min):  56 1:1 Treatment Time:  40       Treatment Area: Neck pain [M54.2]    SUBJECTIVE  Pain Level (0-10 scale): 3  Any medication changes, allergies to medications, adverse drug reactions, diagnosis change, or new procedure performed?: [x] No    [] Yes (see summary sheet for update)  Subjective functional status/changes:   [] No changes reported  Pt reports some right superior-medial scapula pain that occurred following the last session, although not new as this has occurred before. Neck feels \"the same. \"    OBJECTIVE    Modality rationale: decrease pain and increase tissue extensibility to improve the patients ability to manage self care. Min Type Additional Details   10 []  Ice     [x]  heat  []  Ice massage  []  Laser   []  Anodyne Position:supine  Location: upper t/s and c/s   [] Skin assessment post-treatment:  []intact []redness- no adverse reaction    []redness  adverse reaction:   10 min Therapeutic Exercise:  [x]? See flow sheet :   Rationale: increase ROM and increase strength to improve the patients ability to manage ADLs with reduced neck pain. 20 min Neuromuscular Re-education:  [x]? See flow sheet :   Rationale: increase strength, improve coordination and increase proprioception  to improve the patients ability to reach overhead and lift with reduced overuse of superficial cervical muscles and improved scapular control.      10 min Manual Therapy: In supine with leg wedge,  STM B scalenes, UT, LS -- c/s sideglides and downglides B    The manual therapy interventions were performed at a separate and distinct time from the therapeutic activities interventions.   Rationale: decrease pain, increase ROM and increase tissue extensibility to reduce stiffness and improve ease of turning neck for driving. With   [] TE   [] TA   [] neuro   [] other: Patient Education: [x] Review HEP    [] Progressed/Changed HEP based on:   [] positioning   [] body mechanics   [] transfers   [] heat/ice application    [] other:      Other Objective/Functional Measures: exercises per flowsheet     Pain Level (0-10 scale) post treatment: 3    ASSESSMENT/Changes in Function: Pt remarks that she has increased \"tension\" along the right superior scalenes following manual therapy and heat. Pt educated that this area was the emphasis for manual therapy and to perform c/s rotation towel stretch at home and apply heat prn for mm soreness. Patient will continue to benefit from skilled PT services to modify and progress therapeutic interventions and assess and modify postural abnormalities to attain remaining goals. []  See Plan of Care  []  See progress note/recertification  []  See Discharge Summary         Progress towards goals / Updated goals:  Short Term Goals: To be accomplished in 2 weeks:  1. Pt will report daily compliance with HEP to reduce pain.               Eval: HEP assigned               Current: has tried \"some\" (5/27/2022) met (5/31/2022)  Long Term Goals: To be accomplished in 4 weeks:  1. Pt will improve B lower trapezius strength to 4/5 MMT to improve postural stability for her exercise routine.              Eval: 3+/5  2. Pt will improve B middle trapezius strength to 4+/5 MMT to reduce pain with computer work.  Leida Pane: right 3+/5, left 4-/5  3. Pt will improve her B deltoid strength to 4+/5 MMT without pain to improve shoulder stability for exercise and overhead ADLs.             Eval: 4/5 with pain right shoulder  4. Pt will report 75% improvement in pain sx to improve tolerance to self care.              Eval: 0  5.  Pt will improve her FOTO to 70, demonstrating improved functional ADL capacity.                Eval: 61    PLAN  []  Upgrade activities as tolerated     [x]  Continue plan of care  []  Update interventions per flow sheet       []  Discharge due to:_  []  Other:_      Bette Gerardo, PT 5/31/2022  3:52 PM    Future Appointments   Date Time Provider Meghna Moreno   6/2/2022 10:45 AM Prema Persaud, PTA [facility]   6/6/2022  1:45 PM Saeed Piña, PT [facility]   6/8/2022  9:45 AM Prema Persaud, PTA [facility]   6/13/2022  8:30 AM Saeed Piña, PT [facility]   6/16/2022  9:15 AM Saeed Piña, PT [facility]

## 2022-06-02 ENCOUNTER — HOSPITAL ENCOUNTER (OUTPATIENT)
Dept: PHYSICAL THERAPY | Age: 72
Discharge: HOME | End: 2022-06-02
Payer: MEDICARE

## 2022-06-02 PROCEDURE — 97112 NEUROMUSCULAR REEDUCATION: CPT

## 2022-06-02 PROCEDURE — 97140 MANUAL THERAPY 1/> REGIONS: CPT

## 2022-06-02 PROCEDURE — 97110 THERAPEUTIC EXERCISES: CPT

## 2022-06-02 NOTE — PROGRESS NOTES
PT DAILY TREATMENT NOTE     Patient Name: Sheela Mccartney  Date:2022  : 1950  [x]  Patient  Verified  Payor: [place] MEDICARE / Plan: [place] MEDICARE PART A & B / Product Type: Medicare /    In time:10:48  Out time:11:47  Total Treatment Time (min): 59  Visit #: 4 of 8    Medicare/BCBS Only   Total Timed Codes (min):  49 1:1 Treatment Time:  45       Treatment Area: Neck pain [M54.2]    SUBJECTIVE  Pain Level (0-10 scale): 2-3  Any medication changes, allergies to medications, adverse drug reactions, diagnosis change, or new procedure performed?: [x] No    [] Yes (see summary sheet for update)  Subjective functional status/changes:   [] No changes reported  Pt reports her neck feels about the same since starting therapy but she understands that she is just getting started. OBJECTIVE    Modality rationale: decrease pain and increase tissue extensibility to improve the patients ability to perform ADL's with ease.    Min Type Additional Details    [] Estim:  []Unatt       []IFC  []Premod                        []Other:  []w/ice   []w/heat  Position:  Location:    [] Estim: []Att    []TENS instruct  []NMES                    []Other:  []w/US   []w/ice   []w/heat  Position:  Location:    []  Traction: [] Cervical       []Lumbar                       [] Prone          []Supine                       []Intermittent   []Continuous Lbs:  [] before manual  [] after manual    []  Ultrasound: []Continuous   [] Pulsed                           []1MHz   []3MHz W/cm2:  Location:    []  Iontophoresis with dexamethasone         Location: [] Take home patch   [] In clinic   10 []  Ice     [x]  heat  []  Ice massage  []  Laser   []  Anodyne Position:supine w/wedge  Location: upper T/S and C/S    []  Laser with stim  []  Other:  Position:  Location:    []  Vasopneumatic Device    []  Right     []  Left  Pre-treatment girth:  Post-treatment girth:  Measured at (location):  Pressure:       [] lo [] med [] hi   Temperature: [] lo [] med [] hi   [] Skin assessment post-treatment:  []intact []redness- no adverse reaction    []redness - adverse reaction:       10 min Therapeutic Exercise:  [x] See flow sheet :   Rationale: increase ROM and increase strength to improve the patients ability to perform functional task with ease. 29 min Neuromuscular Re-education:  [x]  See flow sheet :   Rationale: increase strength, improve coordination and increase proprioception  to improve the patients ability to perform ADL's with ease. 10 min Manual Therapy:   In supine with leg wedge,  STM B scalenes, UT, LS. Left S/L TPR to left UT, scapular circles, STM along vertebral border of right scap. The manual therapy interventions were performed at a separate and distinct time from the therapeutic activities interventions. Rationale: decrease pain, increase ROM and increase tissue extensibility to improve functional mobility with ADL's. With   [] TE   [] TA   [] neuro   [] other: Patient Education: [x] Review HEP    [] Progressed/Changed HEP based on:   [] positioning   [] body mechanics   [] transfers   [] heat/ice application    [] other:      Other Objective/Functional Measures:      Pain Level (0-10 scale) post treatment: 3    ASSESSMENT/Changes in Function:   Increased reps with most exercises with no complaints of increased pain or discomfort in the c/s. No change in pain post treatment. Patient will continue to benefit from skilled PT services to modify and progress therapeutic interventions, address functional mobility deficits, address ROM deficits, address strength deficits, analyze and address soft tissue restrictions, analyze and cue movement patterns, analyze and modify body mechanics/ergonomics and assess and modify postural abnormalities to attain remaining goals.      [x]  See Plan of Care  []  See progress note/recertification  []  See Discharge Summary         Progress towards goals / Updated goals:  Short Term Goals: To be accomplished in 2 weeks:  1. Pt will report daily compliance with HEP to reduce pain.               Eval: HEP assigned               Current: has tried \"some\" (5/27/2022) met (5/31/2022)  Long Term Goals: To be accomplished in 4 weeks:  1. Pt will improve B lower trapezius strength to 4/5 MMT to improve postural stability for her exercise routine.              Eval: 3+/5  2. Pt will improve B middle trapezius strength to 4+/5 MMT to reduce pain with computer work.  Rochelle Mcmahan: right 3+/5, left 4-/5  3. Pt will improve her B deltoid strength to 4+/5 MMT without pain to improve shoulder stability for exercise and overhead ADLs.             Eval: 4/5 with pain right shoulder  4. Pt will report 75% improvement in pain sx to improve tolerance to self care.              Eval: 0  5. Pt will improve her FOTO to 70, demonstrating improved functional ADL capacity.                Eval: 61    PLAN  []  Upgrade activities as tolerated     [x]  Continue plan of care  []  Update interventions per flow sheet       []  Discharge due to:_  []  Other:_      Maira Saba, PTA 6/2/2022  10:54 AM    Future Appointments   Date Time Provider Meghna Moreno   6/6/2022  1:45 PM Olivaair Al, PT [facility]   6/8/2022  9:45 AM Katie Score, PTA [facility]   6/13/2022  8:30 AM Oliva Servando, PT [facility]   6/16/2022  9:15 AM Oliva Baseeric, PT [facility]

## 2022-06-06 ENCOUNTER — APPOINTMENT (OUTPATIENT)
Dept: PHYSICAL THERAPY | Age: 72
End: 2022-06-06
Payer: MEDICARE

## 2022-06-08 ENCOUNTER — HOSPITAL ENCOUNTER (OUTPATIENT)
Dept: PHYSICAL THERAPY | Age: 72
Discharge: HOME | End: 2022-06-08
Payer: MEDICARE

## 2022-06-08 ENCOUNTER — TELEPHONE (OUTPATIENT)
Dept: PHYSICAL THERAPY | Age: 72
End: 2022-06-08

## 2022-06-08 PROCEDURE — 97032 APPL MODALITY 1+ESTIM EA 15: CPT

## 2022-06-08 PROCEDURE — 97140 MANUAL THERAPY 1/> REGIONS: CPT

## 2022-06-08 PROCEDURE — 97110 THERAPEUTIC EXERCISES: CPT

## 2022-06-08 NOTE — PROGRESS NOTES
PT DAILY TREATMENT NOTE     Patient Name: Som Mayorga  Date:2022  : 1950  [x]  Patient  Verified  Payor: [facility] MEDICARE / Plan: [place] MEDICARE PART A & B / Product Type: Medicare /    In time:10:31  Out time:11:30  Total Treatment Time (min): 59  Visit #: 5 of 8    Medicare/BCBS Only   Total Timed Codes (min):  49 1:1 Treatment Time:  49       Treatment Area: Neck pain [M54.2]    SUBJECTIVE  Pain Level (0-10 scale): 3  Any medication changes, allergies to medications, adverse drug reactions, diagnosis change, or new procedure performed?: [x] No    [] Yes (see summary sheet for update)  Subjective functional status/changes:   [] No changes reported  Pt reports no changes since last visit. OBJECTIVE    Modality rationale: decrease pain and increase tissue extensibility to improve the patients ability to perform ADL's with ease.    Min Type Additional Details    [] Estim:  []Unatt       []IFC  []Premod                        []Other:  []w/ice   []w/heat  Position:  Location:   8 [x] Estim: [x]Att    []TENS instruct  []NMES                    []Other:  [x]w/US   []w/ice   []w/heat  Position:seated  Location: B UT'    []  Traction: [] Cervical       []Lumbar                       [] Prone          []Supine                       []Intermittent   []Continuous Lbs:  [] before manual  [] after manual    []  Ultrasound: []Continuous   [] Pulsed                           []1MHz   []3MHz W/cm2:  Location:    []  Iontophoresis with dexamethasone         Location: [] Take home patch   [] In clinic   10 []  Ice     [x]  heat  []  Ice massage  []  Laser   []  Anodyne Position:supine  Location: C/S    []  Laser with stim  []  Other:  Position:  Location:    []  Vasopneumatic Device    []  Right     []  Left  Pre-treatment girth:  Post-treatment girth:  Measured at (location):  Pressure:       [] lo [] med [] hi   Temperature: [] lo [] med [] hi   [] Skin assessment post-treatment:  []intact []redness- no adverse reaction    []redness - adverse reaction:         33 min Therapeutic Exercise:  [x] See flow sheet :   Rationale: increase ROM and increase strength to improve the patients ability to perform functional task with ease. 8 min Manual Therapy: In supine with leg wedge,  STM/TPR B jetnes, UT, LS. The manual therapy interventions were performed at a separate and distinct time from the therapeutic activities interventions. Rationale: decrease pain, increase ROM, increase tissue extensibility and decrease trigger points to improve functional mobility with ADL's. With   [] TE   [] TA   [] neuro   [] other: Patient Education: [x] Review HEP    [] Progressed/Changed HEP based on:   [] positioning   [] body mechanics   [] transfers   [] heat/ice application    [] other:      Other Objective/Functional Measures:      Pain Level (0-10 scale) post treatment: 3    ASSESSMENT/Changes in Function:   Combo trial to the UT/LS initiated this visit to aid with decreased discomfort and improved mobility in the C/S. Pt does display improved B lower trap strength reaching LTG #1 with a 4/5 MMT. Pt reports no change in pain post treatment. Patient will continue to benefit from skilled PT services to modify and progress therapeutic interventions, address functional mobility deficits, address ROM deficits, address strength deficits, analyze and address soft tissue restrictions, analyze and cue movement patterns, analyze and modify body mechanics/ergonomics and assess and modify postural abnormalities to attain remaining goals. [x]  See Plan of Care  []  See progress note/recertification  []  See Discharge Summary         Progress towards goals / Updated goals:  Short Term Goals: To be accomplished in 2 weeks:  1.  Pt will report daily compliance with HEP to reduce pain.               Eval: HEP assigned               Current: has tried \"some\" (5/27/2022) met (5/31/2022)  Long Term Goals: To be accomplished in 4 weeks: 1. Pt will improve B lower trapezius strength to 4/5 MMT to improve postural stability for her exercise routine.              Eval: 3+/5  Current: Met 6/8/22 B lower trapezius 4/5  2. Pt will improve B middle trapezius strength to 4+/5 MMT to reduce pain with computer work.  [place] Monday: right 3+/5, left 4-/5  3. Pt will improve her B deltoid strength to 4+/5 MMT without pain to improve shoulder stability for exercise and overhead ADLs.             Eval: 4/5 with pain right shoulder  4. Pt will report 75% improvement in pain sx to improve tolerance to self care.              Eval: 0  5. Pt will improve her FOTO to 70, demonstrating improved functional ADL capacity.                Eval: 61    PLAN  []  Upgrade activities as tolerated     [x]  Continue plan of care  []  Update interventions per flow sheet       []  Discharge due to:_  []  Other:_      Neo White, ANNAMARIA 6/8/2022  10:32 AM    Future Appointments   Date Time Provider Meghna Moreno   6/10/2022 12:45 PM [facility]   6/13/2022  8:30 AM Jacki Quails, PT [facility]   6/16/2022  9:15 AM Jacki Quails, PT [facility]

## 2022-06-10 ENCOUNTER — HOSPITAL ENCOUNTER (OUTPATIENT)
Dept: PHYSICAL THERAPY | Age: 72
Discharge: HOME | End: 2022-06-10
Payer: MEDICARE

## 2022-06-10 PROCEDURE — 97032 APPL MODALITY 1+ESTIM EA 15: CPT

## 2022-06-10 PROCEDURE — 97110 THERAPEUTIC EXERCISES: CPT

## 2022-06-10 PROCEDURE — 97112 NEUROMUSCULAR REEDUCATION: CPT

## 2022-06-10 NOTE — PROGRESS NOTES
PT DAILY TREATMENT NOTE     Patient Name: Daniel Dao  Date:6/10/2022  : 1950  [x]  Patient  Verified  Payor: Daisha Glover / Plan: [place] MEDICARE PART A & B / Product Type: Medicare /    In time:1255pm  Out time:143pm  Total Treatment Time (min): 48  Visit #: 6 of 8    Medicare/BCBS Only   Total Timed Codes (min):  48 1:1 Treatment Time:  38       Treatment Area: Neck pain [M54.2]    SUBJECTIVE  Pain Level (0-10 scale): 2  Any medication changes, allergies to medications, adverse drug reactions, diagnosis change, or new procedure performed?: [x] No    [] Yes (see summary sheet for update)  Subjective functional status/changes:   [] No changes reported  Had a little relief from last session but mostly the same. OBJECTIVE    Modality rationale: decrease pain, increase tissue extensibility and increase muscle contraction/control to improve the patients ability to manage self care. Min Type Additional Details   10 [x] Estim: [x]Att    []TENS instruct  []NMES                    [x]Other: [employer] [x]w/US   []w/ice   []w/heat  Position: seated  Location: B UT/LS   10 []  Ice     [x]  heat  []  Ice massage  []  Laser   []  Anodyne Position: supine w/ wedge  Location: c/s and t/s   [] Skin assessment post-treatment:  []intact []redness- no adverse reaction    []redness - adverse reaction:     10 min Therapeutic Exercise:  [x]? ? See flow sheet :   Rationale: increase ROM and increase strength to improve the patients ability to manage ADLs with reduced neck pain.     18 min Neuromuscular Re-education:  [x]? ?  See flow sheet :   Rationale: increase strength, improve coordination and increase proprioception  to improve the patients ability to reach overhead and lift with reduced overuse of superficial cervical muscles and improved scapular control.           With   [] TE   [] TA   [] neuro   [] other: Patient Education: [x] Review HEP    [] Progressed/Changed HEP based on:   [] positioning   [] body mechanics   [] transfers   [] heat/ice application    [] other:      Other Objective/Functional Measures: exercises per flowsheet     Pain Level (0-10 scale) post treatment: 2    ASSESSMENT/Changes in Function: Discussed with pt that we will try another round of estim/US to ascertain any benefit to her pain, and that if this does not work, we will try mechanical TX next week. If there is no change in pain, we will d/c back to MD. If she feels benefit with the addition of either modality, we will continue. There is some improvement in exercise posture noted, but continued forward head and elevated thoracic kyphosis are evident. Patient will continue to benefit from skilled PT services to modify and progress therapeutic interventions, address functional mobility deficits, address ROM deficits, address strength deficits, analyze and address soft tissue restrictions, analyze and cue movement patterns, analyze and modify body mechanics/ergonomics and assess and modify postural abnormalities to attain remaining goals. []  See Plan of Care  []  See progress note/recertification  []  See Discharge Summary         Progress towards goals / Updated goals:  Short Term Goals: To be accomplished in 2 weeks:  1. Pt will report daily compliance with HEP to reduce pain.               Eval: HEP assigned               Current: has tried \"some\" (5/27/2022) met (5/31/2022)  Long Term Goals: To be accomplished in 4 weeks:  1. Pt will improve B lower trapezius strength to 4/5 MMT to improve postural stability for her exercise routine.              Eval: 3+/5   Current: Met 6/8/22 B lower trapezius 4/5  2. Pt will improve B middle trapezius strength to 4+/5 MMT to reduce pain with computer work.  Vince Gens: right 3+/5, left 4-/5  3. Pt will improve her B deltoid strength to 4+/5 MMT without pain to improve shoulder stability for exercise and overhead ADLs.             Eval: 4/5 with pain right shoulder  4.  Pt will report 75% improvement in pain sx to improve tolerance to self care.              Eval: 0  5. Pt will improve her FOTO to 70, demonstrating improved functional ADL capacity.                Eval: 61    PLAN  []  Upgrade activities as tolerated     [x]  Continue plan of care  []  Update interventions per flow sheet       []  Discharge due to:_  []  Other:_      Benjamin Mancilla, PT 6/10/2022  1:00 PM    Future Appointments   Date Time Provider Meghna Moreno   6/13/2022  8:30 AM Nora Barger, PT [facility]   6/16/2022  9:15 AM Nora Barger, PT [facility]

## 2022-06-13 ENCOUNTER — HOSPITAL ENCOUNTER (OUTPATIENT)
Dept: PHYSICAL THERAPY | Age: 72
Discharge: HOME | End: 2022-06-13
Payer: MEDICARE

## 2022-06-13 PROCEDURE — 97112 NEUROMUSCULAR REEDUCATION: CPT

## 2022-06-13 PROCEDURE — 97140 MANUAL THERAPY 1/> REGIONS: CPT

## 2022-06-13 PROCEDURE — 97012 MECHANICAL TRACTION THERAPY: CPT

## 2022-06-13 PROCEDURE — 97110 THERAPEUTIC EXERCISES: CPT

## 2022-06-13 NOTE — PROGRESS NOTES
PT DAILY TREATMENT NOTE     Patient Name: Yannick Guardado  Date:2022  : 1950  [x]  Patient  Verified  Payor: Brigid Good / Plan: [place] MEDICARE PART A & B / Product Type: Medicare /    In time:835am  Out time:930am  Total Treatment Time (min): 55  Visit #: 7 of 8    Medicare/BCBS Only   Total Timed Codes (min):  45 1:1 Treatment Time:  40       Treatment Area: Neck pain [M54.2]    SUBJECTIVE  Pain Level (0-10 scale): 2  Any medication changes, allergies to medications, adverse drug reactions, diagnosis change, or new procedure performed?: [x] No    [] Yes (see summary sheet for update)  Subjective functional status/changes:   [] No changes reported  Pt felt a little better after last session. Will try [address] today. OBJECTIVE    Modality rationale: decrease pain and increase tissue extensibility to improve the patients ability to manage ADLs with improved ease. Min Type Additional Details   10 [x]  Traction: [] Cervical       []Lumbar                       [] Prone          [x]Supine                       []Intermittent   [x]Continuous Lbs: 26  [] before manual  [x] after manual   [] Skin assessment post-treatment:  []intact []redness- no adverse reaction    []redness - adverse reaction:     12 min Therapeutic Exercise:  [x]? ?? See flow sheet :   Rationale: increase ROM and increase strength to improve the patients ability to manage ADLs with reduced neck pain.     20 min Neuromuscular Re-education:  [x]? ??  See flow sheet :   Rationale: increase strength, improve coordination and increase proprioception  to improve the patients ability to reach overhead and lift with reduced overuse of superficial cervical muscles and improved scapular control.     8 min Manual Therapy:  STM of right UT and scalenes   The manual therapy interventions were performed at a separate and distinct time from the therapeutic activities interventions.   Rationale: decrease pain, increase ROM and increase tissue extensibility to improve comfort with ADLs. With   [] TE   [] TA   [] neuro   [] other: Patient Education: [x] Review HEP    [] Progressed/Changed HEP based on:   [] positioning   [] body mechanics   [] transfers   [] heat/ice application    [] other:      Other Objective/Functional Measures: initiated mechanical TX trial     Pain Level (0-10 scale) post treatment: 1-2    ASSESSMENT/Changes in Function: Pt perceives a slight improvement in pain and stiffness following manual and mechanical TX. Plan to reassess progress with tx at [place] to advise on d/c back to MD or continuation of PT. Patient will continue to benefit from skilled PT services to modify and progress therapeutic interventions, address functional mobility deficits, address ROM deficits, address strength deficits, analyze and address soft tissue restrictions, analyze and cue movement patterns, analyze and modify body mechanics/ergonomics and assess and modify postural abnormalities to attain remaining goals. []  See Plan of Care  []  See progress note/recertification  []  See Discharge Summary         Progress towards goals / Updated goals:  Short Term Goals: To be accomplished in 2 weeks:  1. Pt will report daily compliance with HEP to reduce pain.               Eval: HEP assigned               Current: has tried \"some\" (5/27/2022) met (5/31/2022)  Long Term Goals: To be accomplished in 4 weeks:  1. Pt will improve B lower trapezius strength to 4/5 MMT to improve postural stability for her exercise routine.              Eval: 3+/5              Current: Met 6/8/22 B lower trapezius 4/5  2. Pt will improve B middle trapezius strength to 4+/5 MMT to reduce pain with computer work.  Sarah Clarke: right 3+/5, left 4-/5    Current: no change, 4-/5 left, 3+/5 right (6/13/2022)  3. Pt will improve her B deltoid strength to 4+/5 MMT without pain to improve shoulder stability for exercise and overhead ADLs.               Eval: 4/5 with pain right shoulder    Current: no change, 4/5 MMT (6/13/2022)  4. Pt will report 75% improvement in pain sx to improve tolerance to self care.              Eval: 0  5. Pt will improve her FOTO to 70, demonstrating improved functional ADL capacity.                Eval: 61    PLAN  []  Upgrade activities as tolerated     [x]  Continue plan of care  []  Update interventions per flow sheet       []  Discharge due to:_  []  Other:_      Lucas Saez, PT 6/13/2022  8:44 AM    Future Appointments   Date Time Provider Meghna Moreno   6/16/2022  9:15 AM Curtis Long, PT [facility]

## 2022-06-16 ENCOUNTER — HOSPITAL ENCOUNTER (OUTPATIENT)
Dept: PHYSICAL THERAPY | Age: 72
Discharge: HOME | End: 2022-06-16
Payer: MEDICARE

## 2022-06-16 PROCEDURE — 97112 NEUROMUSCULAR REEDUCATION: CPT

## 2022-06-16 PROCEDURE — 97140 MANUAL THERAPY 1/> REGIONS: CPT

## 2022-06-16 PROCEDURE — 97110 THERAPEUTIC EXERCISES: CPT

## 2022-06-16 NOTE — PROGRESS NOTES
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 Visits from Start of Care: 8    Missed Visits: 0    Reporting Period : 2022 to 2022    [x]  Patient  Verified  Payor: Yoon Anderson / Plan: [facility] MEDICARE PART A & B / Product Type: Medicare /    In time:921am  Out time:1014am  Total Treatment Time (min): 53  Visit #: 8 of 8    Medicare/BCBS Only   Total Timed Codes (min):  53 1:1 Treatment Time:  40       SUBJECTIVE  Pain Level (0-10 scale): 2  Any medication changes, allergies to medications, adverse drug reactions, diagnosis change, or new procedure performed?: [x] No    [] Yes (see summary sheet for update)  Subjective functional status/changes:   [] No changes reported  Subjectively reports 30% improvement in sx. OBJECTIVE    Modality rationale: decrease pain and increase tissue extensibility to improve the patients ability to manage ADLs. Min Type Additional Details    [] Estim: []Att    []TENS instruct  []NMES                    []Other:  []w/US   []w/ice   []w/heat  Position:  Location:    []  Traction: [] Cervical       []Lumbar                       [] Prone          []Supine                       []Intermittent   []Continuous Lbs:  [] before manual  [] after manual   10 []  Ice     [x]  heat  []  Ice massage  []  Laser   []  Anodyne Position: supine w/ wedge  Location:thoracic spine and neck   [] Skin assessment post-treatment:  []intact []redness- no adverse reaction    []redness - adverse reaction:     12 min Therapeutic Exercise:  [x]? ??? See flow sheet : Rationale: increase ROM and increase strength to improve the patients ability to manage ADLs with reduced neck pain.     20 min Neuromuscular Re-education:  [x]? ???  See flow sheet :   Rationale: increase strength, improve coordination and increase proprioception  to improve the patients ability to reach overhead and lift with reduced overuse of superficial cervical muscles and improved scapular control.      8 min Manual Therapy:  STM of right UT and scalenes; c/s PA's and right c/s upglides & downglides GR III   The manual therapy interventions were performed at a separate and distinct time from the therapeutic activities interventions. Rationale: decrease pain, increase ROM and increase tissue extensibility to improve comfort with ADLs. With   [] TE   [] TA   [] neuro   [] other: Patient Education: [x] Review HEP    [] Progressed/Changed HEP based on:   [] positioning   [] body mechanics   [] transfers   [] heat/ice application    [] other:      Other Objective/Functional Measures: see summary of care     Pain Level (0-10 scale) post treatment: 2    Summary of Care:  Short Term Goals: To be accomplished in 2 weeks:  1. Pt will report daily compliance with HEP to reduce pain.               Eval: HEP assigned               Current: met (5/31/2022)  Long Term Goals: To be accomplished in 4 weeks:  1. Pt will improve B lower trapezius strength to 4/5 MMT to improve postural stability for her exercise routine.              Eval: 3+/5              Current: Met 6/8/22 B lower trapezius 4/5  2. Pt will improve B middle trapezius strength to 4+/5 MMT to reduce pain with computer work.  Christina Lambert: right 3+/5, left 4-/5                       Current: not met, 4-/5 left, 3+/5 right (6/13/2022)  3. Pt will improve her B deltoid strength to 4+/5 MMT without pain to improve shoulder stability for exercise and overhead ADLs.               Eval: 4/5 with pain right shoulder               Current: not met, 4/5 MMT (6/13/2022)  4. Pt will report 75% improvement in pain sx to improve tolerance to self care.              Eval: 0    Current: not met/progressing, 30% (6/16/2022)   5. Pt will improve her FOTO to 70, demonstrating improved functional ADL capacity.                Eval: 63   Current: not met, 61 (6/16/2022)    ASSESSMENT/Changes in Function: Pt demonstrates some subjective improvement in pain, but expresses continued \"stiffness\" of the neck. This is unapproved by manual therapy, electrical stimulation/ultrasound combination therapy, or mechanical TX. She opts to d/c today and is advised to continue with HEP for at least one more month to ascertain if postural strengthening and c/s and t/s mobility activities will improve her pain c/o. She had no further questions.      Thank you for this referral!     PLAN  [x]Discontinue therapy: [x]Patient has reached or is progressing toward set goals      []Patient is non-compliant or has abdicated      []Due to lack of appreciable progress towards set goals    Emilio Hansen, PT 6/16/2022  9:29 AM

## 2022-06-16 NOTE — PROGRESS NOTES
Physical Therapy Discharge Instructions      [facility]  [address]  [place], [address]  [phone] fax    Patient: Chris Sidhu  : 1950      Continue Home Exercise Program 2 times per day for 4 weeks, then decrease to 3 times per week      Continue with    [] Ice  as needed 2-3 times per day     [x] Heat           Follow up with MD:     [] Upon completion of therapy     [x] As needed      Recommendations:     [x]   Return to activity with home program    []   Return to activity with the following modifications:       []Post Rehab Program    []Join Independent aquatic program     []Return to/join local gym    [place], [place] 2022 9:45 AM

## 2022-07-25 NOTE — PROGRESS NOTES
AMBULATORY PROGRESS NOTE      Patient: Tommy Pool             MRN: [id number]     SSN: [id number] Body mass index is 24.98 kg/m². YOB: 1950     AGE: 71 y.o. EX: female    PCP: Sri Lara MD       IMPRESSION //  DIAGNOSIS AND TREATMENT PLAN      Tommy Pool has a diagnosis of:      DIAGNOSES    1. Achilles tendinosis    2. Achilles tendinitis of left lower extremity        Orders Placed This Encounter    AMB POC XRAY; CALCANEUS, 2+ VIEWS     Order Specific Question:   Reason for Exam     Answer:   1V lateral            PLAN:    1. I obtained left calcaneus 1V (lateral) XR in the office today. 2. I discussed the process, prognosis and recovery timeline for surgical intervention. She wants to wait on surgery until after May 2023, as she has a [employer] cruise planned. Explained to her that surgical correction in 6 months to a year. She saw the surgeon in [place], they told her similar technique, excisional debridement of the Achilles tendon, bursectomy calcaneal ostectomy, and repeat. Achilles intact the bone. Splinting casting, protection. All the durations were explained to her. Informed decision was made for continue conservative care    And eccentric stretching. 3. AVS: Runner's stretch with gentle push-off, 10 reps x 3 sets daily  4. I advise the patient to avoid high-impact exercise and try water aerobics if desired. RTO PRN    Patient Instructions   Runner's stretch with gentle push-off, 10 reps x 3 sets daily        Please follow up with your PCP for any health maintenance as recommended         Tommy Pool  expresses understanding of the diagnosis, treatment plan, and all of their proposed questions were answered to their satisfaction. Patient education has been provided re the diagnoses.          HPI //  OBJECTIVE EXAMINATION      Tommy Pool IS A 71 y.o. female who is a/an  established patient, presenting to my outpatient office for evaluation of  the following chief complaint(s):     Chief Complaint   Patient presents with    Ankle Pain     lt    Foot Pain     lt         At [facility], Minesh Maya presented with left Achilles tendinitis. I  ordered DME: rubber heel insert. I referred her to physical therapy with low-frequency ultrasound and Graston technique. I encouraged the patient to employ gentle runner's stretches at home. Since [facility] Minesh Maya continues to endorse pain in the left ankle. She complain of pain in the left posterior heel. She admits to difficulty with bearing weight on the left lower extremity upon rising from bed due to pain. She denies any relief with a Tenex procedure by a podiatrist at [facility]. She states she previously followed up with another physician in [place] regarding her Achilles tendinosis and was recommended for surgical evaluation but chose to defer until she returned to [place]. She mentions she is s/p hysterectomy and breast lumpectomy, both of which she states involved a longer recovery period than predicted. 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 She also states she is scheduled for a [employer] cruise in 05/2023. MRI, develops persistent, June 28, 2020, of her left ankle. It showed region of tendinopathy, calcific changes to the distal Achilles tendon. Official reading, for this MRI, is as below:  She had MRI, at [address] [place] [address], on December 23, 2021 3 PM.  This additional MRI showed abnormal Achilles tendon, cyst susceptibility artifact surrounds distal tendon fibers with small central and medial focal intratendinous T2 signal seen low grade interstitial partial delamination tears noted. Underlying tendinosis, and millimeter tendon thickening with interstitial intermediate signal was noted.   Adjacent calcaneal bone marrow edema with cystic change and additional susceptibility artifact noted. Peritendinous edema and inflammation with extensive edema and inflammation pre-Achilles fat and ventral calcaneal bursitis 10 mL, seen. No high-grade acute tendon tear or disruption. The ATFL was attenuated, the PT FL thickening with intermediate signal with adjacent fibula 4 mm cyst at insertion, mucoid change or degeneration. No acute tear. There are some degenerative changes seen at the navicular subchondral cystic changes and edema of the cuneiforms medial, middle, mild degenerative changes. No talar dome osteochondral lesions no acute fractures. Additional ligaments, medial peroneal, extensor tendons are intact. Tarsal tunnel, sinus Tarsi, plantar fascial are within normal limits. Visit Vitals  Temp 97.5 °F (36.4 °C) (Temporal)   Ht 5' 3\" (1.6 m)   Wt 141 lb (64 kg)   BMI 24.98 kg/m²       Appearance: Alert, well appearing and pleasant patient who is in no distress, oriented to person, place/time, and who follows commands. Normal dress/motor activity/thought processes/memory. This patient is accompanied in the examination room by her  self. Patient arrives to office via: with assistive device: left heel lift insert    Psychiatric:  Normal Affect/mood. Judgement, behavior, and conduct are appropriate. Speech normal in context and clarity, memory intact grossly, no involuntary movements - tremors. H EENT (2): Head normocephalic & atraumatic. Eye: pupils are round// EOM are intact // Neck: ROM WNL  // Hearings Intact   Respiratory: Breathing non labored     ANKLE/FOOT left    Gait: uses assistive device - left heel lift insert  Tenderness: moderate over the insertional Achilles tendon  Cutaneous: mild swelling over the lateral heel and posterolateral hindfoot. Joint Motion: WNL   Joint / Tendon Stability:  No Ankle or Subtalar instability or joint laxity.                        No peroneal sublux ability or dislocation  Alignment: neutral Hindfoot,    Neuro Motor/Sensory: NL/NL  Vascular: NL foot/ankle pulses,   Lymphatics: No extremity lymphedema, No calf swelling, no tenderness to calf muscles. CHART REVIEW     Tommy Pool has been experiencing pain and discomfort confirmed as outlined in the pain assessment outlined below.  was reviewed by Neil Upton MD, on 7/26/2022. Pain Assessment  7/26/2022   Location of Pain Ankle; Foot   Pain Location Comment -   Location Modifiers Left   Severity of Pain 2   Quality of Pain Aching   Duration of Pain Persistent   Frequency of Pain Intermittent   Date Pain First Started (No Data)   Date Pain First Started Comment f/u   Aggravating Factors Walking;Stairs; Exercise   Aggravating Factors Comment -   Limiting Behavior Yes   Relieving Factors Nothing   Result of Injury No        Tommy Pool  has a past medical history of Anal fissure, GERD (gastroesophageal reflux disease), and H/O: hysterectomy (02/12/1998). She has no past medical history of Difficult intubation, Malignant hyperthermia due to anesthesia, Nausea & vomiting, or Pseudocholinesterase deficiency. Patients is employed at:          has a past medical history of Anal fissure, GERD (gastroesophageal reflux disease), and H/O: hysterectomy (02/12/1998). She has no past medical history of Difficult intubation, Malignant hyperthermia due to anesthesia, Nausea & vomiting, or Pseudocholinesterase deficiency. has a past surgical history that includes pr breast surgery procedure unlisted; hx gyn; hx endoscopy; hx gi; hx gi; hx hysterectomy (02/12/1998); and hx ankle fracture tx (Left, 2021). family history includes Diabetes in her father and sister; Hypertension in her mother and sister; Stroke in her mother. Current Outpatient Medications   Medication Sig    sucralfate (CARAFATE) 1 gram tablet Take 1 g by mouth four (4) times daily. diclofenac (VOLTAREN) 1 % gel Apply 4 g to affected area two (2) times a day.  Apply to affected lateral ankle twice daily    alum-mag hydroxide-simeth (MYLANTA) 200-200-20 mg/5 mL susp Take 30 mL by mouth every four (4) hours as needed. calcium carbonate (TUMS) 200 mg calcium (500 mg) chew Take 1 Tab by mouth daily. loratadine (CLARITIN) 10 mg tablet Take 10 mg by mouth daily. ranitidine (ZANTAC) 150 mg tablet Take 150 mg by mouth two (2) times a day. No current facility-administered medications for this visit. Allergies   Allergen Reactions    Doxycycline Rash    Penicillin G Rash     Social History     Occupational History    Not on file   Tobacco Use    Smoking status: Never    Smokeless tobacco: Never   Substance and Sexual Activity    Alcohol use: No     Alcohol/week: 0.0 standard drinks    Drug use: No    Sexual activity: Not on file       reports that she has never smoked. She has never used smokeless tobacco. She reports that she does not drink alcohol and does not use drugs. DIAGNOSTIC LAB DATA      No results found for: HBA1C, GRN1DJOT, YFR8TSYQ //   Lab Results   Component Value Date/Time    Glucose 85 02/08/2019 12:49 PM        No results found for: XJT9TLEX, PPQ4ZDVD      No results found for: VITD3, XQVID2, XQVID3, XQVID, VD3RIA, AGQB80UKZQW     Drug Screen Most Recent Result Date    No resulted procedures found. REVIEW OF SYSTEMS : 7/26/2022  ALL BELOW ARE Negative except : SEE HPI     All other systems reviewed and are negative. 12 point review of systems otherwise negative unless noted in HPI. RADIOGRAPHS// IMAGING//DIAGNOSTIC DATA     Orders Placed This Encounter    AMB POC XRAY; CALCANEUS, 2+ VIEWS            LATERAL LEFT HINDFOOT     LEFT X-rays, NON WEIGHT BEARING, 1 views,   LATERAL VIEW completed 7/26/2022 AT [facility]    X-rays reveal   Osseous: Insertional bone spur seen at insertion point of the Achilles tendon, and a singular lateral view. No acute fracture//there are no dislocation or subluxation noted. Overall alignment is  acceptable. Soft tissue swelling is seen at the insertion point of the Achilles tendon noted. No osteolytic or osteoblastic lesions noted. Mineralization suggests YES osteopenia. Degenerative changes are mild degenerative spurs in the anteropulsion ankle joint lateral x-ray. Bone spur seen at the TMT joints, lateral x-ray. Noted. Calcified vessels are NOT present. I have personally reviewed the images of the above study. The interpretation of this study is my professional opinion           I have spent 30 minutes reviewing the previous notes, reviewing diagnostic studies [Advanced  Imaging, Diagnostic test results (x-rays)] and had a direct face to face with the patient discussing the diagnosis and importance of compliance with the treatment and plan. There is  discussion for the potential for surgery, answering all questions, as well as documenting patient care coordination for this individual on the day of the visit. Disclaimer:     Sections of this note are dictated using utilizing voice recognition software, which may have resulted in some phonetic based errors in grammar and contents. Even though attempts were made to correct all the mistakes, some may have been missed, and remained in the body of the document. If questions arise, please contact our department. An electronic signature was used to authenticate this note. Juju Madrigal may have a reminder for a \"due or due soon\" health maintenance. I have asked that she contact her primary care provider for follow-up on this health maintenance. Patient Instructions   Runner's stretch with gentle push-off, 10 reps x 3 sets daily        Please follow up with your PCP for any health maintenance as recommended.                 Scribed by Tammie Knowles, as dictated by Nini Jarrett MD.   7/26/2022  1:46 PM

## 2022-07-26 ENCOUNTER — OFFICE VISIT (OUTPATIENT)
Dept: ORTHOPEDIC SURGERY | Age: 72
End: 2022-07-26
Payer: MEDICARE

## 2022-07-26 VITALS — BODY MASS INDEX: 24.98 KG/M2 | TEMPERATURE: 97.5 F | HEIGHT: 63 IN | WEIGHT: 141 LBS

## 2022-07-26 DIAGNOSIS — M76.62 ACHILLES TENDINITIS OF LEFT LOWER EXTREMITY: ICD-10-CM

## 2022-07-26 DIAGNOSIS — M67.88 ACHILLES TENDINOSIS: Primary | ICD-10-CM

## 2022-07-26 PROCEDURE — G8400 PT W/DXA NO RESULTS DOC: HCPCS | Performed by: ORTHOPAEDIC SURGERY

## 2022-07-26 PROCEDURE — 1123F ACP DISCUSS/DSCN MKR DOCD: CPT | Performed by: ORTHOPAEDIC SURGERY

## 2022-07-26 PROCEDURE — G8420 CALC BMI NORM PARAMETERS: HCPCS | Performed by: ORTHOPAEDIC SURGERY

## 2022-07-26 PROCEDURE — G8536 NO DOC ELDER MAL SCRN: HCPCS | Performed by: ORTHOPAEDIC SURGERY

## 2022-07-26 PROCEDURE — 1101F PT FALLS ASSESS-DOCD LE1/YR: CPT | Performed by: ORTHOPAEDIC SURGERY

## 2022-07-26 PROCEDURE — 73650 X-RAY EXAM OF HEEL: CPT | Performed by: ORTHOPAEDIC SURGERY

## 2022-07-26 PROCEDURE — G8427 DOCREV CUR MEDS BY ELIG CLIN: HCPCS | Performed by: ORTHOPAEDIC SURGERY

## 2022-07-26 PROCEDURE — 1090F PRES/ABSN URINE INCON ASSESS: CPT | Performed by: ORTHOPAEDIC SURGERY

## 2022-07-26 PROCEDURE — G8432 DEP SCR NOT DOC, RNG: HCPCS | Performed by: ORTHOPAEDIC SURGERY

## 2022-07-26 PROCEDURE — 3017F COLORECTAL CA SCREEN DOC REV: CPT | Performed by: ORTHOPAEDIC SURGERY

## 2022-07-26 PROCEDURE — 99214 OFFICE O/P EST MOD 30 MIN: CPT | Performed by: ORTHOPAEDIC SURGERY

## 2022-07-26 NOTE — PROGRESS NOTES
DIAGNOSTIC IMAGING         MRI Results (most recent):  Results from Hospital Encounter encounter on 06/20/20    MRI ANKLE LT WO CONT    Narrative  EXAM: MRI ANKLE LT WO CONT    CLINICAL INDICATION/HISTORY: 72 years Female. evaluate achilles tendon. Additional History: Chronic posterior ankle pain not responsive to nonsurgical  management. Clinical concern for interstitial tear of the Achilles tendon. COMPARISON: None    TECHNIQUE: Multiplanar, multisequence MR imaging of the left ankle without  contrast.    FINDINGS:    OSSEOUS STRUCTURES/JOINTS:  There is mild subchondral edema at the navicular bone at the articulation with  the medial and middle cuneiforms (sagittal, 9; axial, 18). The ankle mortise is preserved and the talar dome is smooth. There is a 0.7 x 0.4 x 0.7 cm subcortical cyst at the medial left fibula at the  level of the lateral mortise (coronal, 19). TENDONS  Flexor: Unremarkable  Peroneals: Unremarkable  Extensor: Unremarkable  Achilles: There is mild edema at the posterior calcaneus and the attachment of  the Achilles tendon (sagittal, 13). A subtle linear intermediate signal band  within the Achilles tendon at this site could represent a small interstitial  tear but normal fascicles probably favored. There is no overlying soft tissue  swelling, fluid or edema. LIGAMENTS  Lateral: Intact  Medial: Intact  Lisfranc: Intact    PLANTAR FASCIA: Unremarkable    MUSCULATURE: Unremarkable    SINUS TARSI: Clear    SOFT TISSUES/OTHER: Unremarkable    Impression  IMPRESSION:    No clear etiology for the patient's clinical symptoms. There is mild marrow  edema at the attachment of the Achilles tendon but no significant tendon  thickening, peritendinous fluid or edema or retrocalcaneal bursal collection. Degenerative subcortical cyst formation is seen at the medial fibula at the  lateral ankle joint and the articulation with the navicular bone and cuneiforms.       Shanel Nix MD [facility]   Physician   Specialty:  Orthopedic Surgery   Progress Notes   Signed   Encounter Date:  6/23/2020                                                                         DIAGNOSTIC IMAGING          MRI Results (most recent):       Results from Hospital Encounter encounter on 06/20/20   MRI ANKLE LT WO CONT     Narrative EXAM: MRI ANKLE LT WO CONT     CLINICAL INDICATION/HISTORY: 72 years Female. evaluate achilles tendon. Additional History: Chronic posterior ankle pain not responsive to nonsurgical  management. Clinical concern for interstitial tear of the Achilles tendon. COMPARISON: None     TECHNIQUE: Multiplanar, multisequence MR imaging of the left ankle without  contrast.     FINDINGS:     OSSEOUS STRUCTURES/JOINTS:  There is mild subchondral edema at the navicular bone at the articulation with  the medial and middle cuneiforms (sagittal, 9; axial, 18). The ankle mortise is preserved and the talar dome is smooth. There is a 0.7 x 0.4 x 0.7 cm subcortical cyst at the medial left fibula at the  level of the lateral mortise (coronal, 19). TENDONS  Flexor: Unremarkable  Peroneals: Unremarkable  Extensor: Unremarkable  Achilles: There is mild edema at the posterior calcaneus and the attachment of  the Achilles tendon (sagittal, 13). A subtle linear intermediate signal band  within the Achilles tendon at this site could represent a small interstitial  tear but normal fascicles probably favored. There is no overlying soft tissue  swelling, fluid or edema. LIGAMENTS  Lateral: Intact  Medial: Intact  Lisfranc: Intact     PLANTAR FASCIA: Unremarkable     MUSCULATURE: Unremarkable     SINUS TARSI: Clear     SOFT TISSUES/OTHER: Unremarkable        Impression IMPRESSION:     No clear etiology for the patient's clinical symptoms.  There is mild marrow  edema at the attachment of the Achilles tendon but no significant tendon  thickening, peritendinous fluid or edema or retrocalcaneal bursal collection. Degenerative subcortical cyst formation is seen at the medial fibula at the  lateral ankle joint and the articulation with the navicular bone and cuneiforms. Electronically signed by Mike Llanes MD at 06/23/20 [id number]    I have reviewed the radiology transcribed results and I have reviewed the images of the above study.        Ling Harris MD  7/26/2022  7:35 AM

## 2023-01-20 ENCOUNTER — HOSPITAL ENCOUNTER (OUTPATIENT)
Dept: PHYSICAL THERAPY | Age: 73
Discharge: HOME | End: 2023-01-20
Payer: MEDICARE

## 2023-01-20 PROCEDURE — 97530 THERAPEUTIC ACTIVITIES: CPT

## 2023-01-20 PROCEDURE — 97161 PT EVAL LOW COMPLEX 20 MIN: CPT

## 2023-01-20 NOTE — THERAPY EVALUATION
[facility] [place] [employer]  [address] [facility]  [phone] fax    Plan of Care/ Statement of Necessity for Physical Therapy Services    Patient name: Lissa Munguia Start of Care: 2023   Referral source: Treasure Bearden MD : 1950    Medical Diagnosis: Pelvic floor dysfunction in female [M62.89]  Payor: Osiel Quezada / Plan: [place] MED[place] A & B / Product Type: Medicare /  Onset Date: 6 months ago    Treatment Diagnosis: PFD, difficulty with emptying bladder, mixed UI   Prior Hospitalization: see medical history Provider#: [id number]   Medications: Verified on Patient summary List    Comorbidities: osteoporosis, arthritis   Prior Level of Function: ind with all mobility, only min leakage, house work, aerobic exercises. The Plan of Care and following information is based on the information from the initial evaluation. Assessment/ little information: Ms. Lissa Munguia is a 72 y/o, F who present with c/o incomplete voiding, mixed UI symptoms. Problem started 6 months ago. Bladder US shows residual voiding is 71 ml. She has difficulty with starting urine stream, experiences incomplete voiding, urgency returns quickly (about 15 min) after voiding bladder. Pt reports min-mod leakage with sneezing/coughing 1-2x/week. Nocturia 3x/night. She notes some bowel urgency but unable to pass BM. She requires mod-max straining with BM and notes incomplete voiding with bowel occasionally. Pt also noted LBP started about 6 months. Evaluation reveals patient with musculoskeletal screen min diastasis recti, decreased flexibility of hips ER, min tightness of B hips flexors/quad. Internal assessment held until next visit per patient request. Patient may benefit from physical therapy to address these limitations to improve her QOL.        Evaluation Complexity History MEDIUM  Complexity : 1-2 comorbidities / personal factors will impact the outcome/ POC ; Examination LOW Complexity : 1-2 Standardized tests and measures addressing body structure, function, activity limitation and / or participation in recreation  ;Presentation MEDIUM Complexity : Evolving with changing characteristics  ; Clinical Decision Making MEDIUM Complexity : FOTO score of 26-74  Overall Complexity Rating: LOW     Problem List: Pelvic pain/dysfunction, Decreased pelvic floor mm awareness, Decreased pelvic floor mm strength, Use of accessory muscles, Improper voiding habits, Hypertonus of pelvic floor, and Urinary urgency    Treatment Plan may include any combination of the following:   Therapeutic exercise, Urge suppression techniques, Neuromuscular re-education, Manual therapy, Physical agent/modality, and Patient education  Patient / Family readiness to learn indicated by: asking questions, trying to perform skills, and interest    Persons(s) to be included in education: patient (P)    Barriers to Learning/Limitations: None    Patient Goal (s): emptying bladder completely    Patient Self Reported Health Status: good    Rehabilitation Potential: good      Short Term Goals: To be accomplished in 4 weeks:  1. Patient will demonstrate accurate performance of home exercise program/pelvic floor contractions as adjunct to physical therapy clinic visits to promote healthy lifestyle and improved quality of life. Eval status: will review at next visit     2. Patient will Complete Bladder Diary for use in patient education and goal setting. Eval status: given diary     3. Patient will improve voiding interval to 1.5-2 hours improve her QOL. Eval status: 30-45 min     4. Patient will report at least 25% improvement with voiding/emptying bladder to improve her QOL. Eval status: incomplete voiding, urgency returns quickly after voiding     Long Term Goals: To be accomplished in 8  weeks:  1.  Patient will demonstrate independence in [facility] for maintenance of pelvic floor program and improved quality of life.  Eval status: will review at next visit     2. Patient will have decreased leakage episodes to </=1 per week for increased quality of life. Eval status: 1-2x/week with coughing/sneezing     3. Patient will demonstrate ~2 hour voiding interval and report at least 60% improvement with voiding/emptying bladder to improve her QOL. Eval status: 30-45 min voiding, incomplete voiding sensation, urgency returns quickly after voiding     4. Patient will have increased pelvic floor muscle motor performance by 1/2 to 1 grade for improved urinary continence and quality of life. Eval status: TBD     5. Patient will have FOTO Urinary Problem score change of TBD points or more indicating improvement in function for icreased quality of life. Eval status: TBD       Frequency / Duration: Patient to be seen 1-2 times per week for 4 weeks. Patient/ Caregiver education and instruction: Diagnosis, prognosis, Proper Voiding Habits, Diet, Pain Management, Exercises, and Bladder Retraining    Certification Period: 1-20-23 to 2-18-23    Abhi Wilber Ricky 1/20/2023 7:45 AM    ________________________________________________________________________    I certify that the above Therapy Services are being furnished while the patient is under my care. I agree with the treatment plan and certify that this therapy is necessary.     [de-identified] Signature:____________Date:_________TIME:________     Eren Barraza MD  ** Signature, Date and Time must be completed for valid certification **    Please sign and return to [facility] [employer]   [address] [place] [address]  [phone] fax

## 2023-01-20 NOTE — THERAPY EVALUATION
PF Daily Treatment Note  Patient Name: Brian Villarreal  Date:2023  [x]  Patient  Verified  Insurance:Payor: [employer] / Plan: [place] MED[place]ARE PART A & B / Product Type: Medicare /   In time: 10:10  Out time: 11:00  Total Treatment Time (min): 50  Total Timed Codes (min): 30  1:1 Treatment Time (MC only): 50   Visit #: 1 of 8-12    Treatment Area: [x] Pelvic Floor     [] Other:  SUBJECTIVE  Pain Level (0-10 scale): 0/10  Any medication changes, allergies to medications, adverse drug reactions, diagnosis change, or new procedure performed?: [x] No    [] Yes (see summary sheet for update)     Ms. Brian Villarreal is a 70 y/o, F who present with c/o incomplete voiding, mixed UI symptoms. Problem started 6 months ago. Bladder US shows residual voiding is 71 ml. She has difficulty with starting urine stream, experiences incomplete voiding, urgency returns quickly (about 15 min) after voiding bladder. Pt reports min-mod leakage with sneezing/coughing 1-2x/week. Nocturia 3x/night. She notes some bowel urgency but unable to pass BM. She requires mod-max straining with BM and notes incomplete voiding with bowel occasionally. Pt also noted LBP started about 6 months. Pelvic Floor Dysfunction Evaluation  1.5 finger width diastasis recti at umbilicus  Min tightness of hips flexors & hips ER B      Musculoskeletal Screen:    Skin Integrity:  [] Healthy [] Red  [] Labia Atrophy [] Fragile    Sensation: [] Intact [] Diminished:    Muscle Bulk: [] Symmetrical  [] Well-developed [] Atrophied:  []L   []R   []B    Prolapse: [] Cystocele:   [] Rectocele:    PERF Score (Performance/Endurance/Repetitions/Flicks)   P: E: R: F: Total:    Patient has failed previous pelvic floor muscle training?   [] Yes    [] No    EMG Evaluation:  [] N/A [] Deferred secondary to:    Channel A: Electrode type:  [] Internal    [] Surface    [] Vaginal    [] Rectal  Channel B: Electrode location:    Baseline Resting Tone (1 minute)  Channel A (microvolts): Quality:  [] Normal [] Irradic [] Elevated  Channel B (microvolts): Slow Twitch: (10 second hold, 20 second rest)  Channel A (microvolts): Quality:[] Quick/slow rise [] Low net rise  Net rise (microvolts):   [] Slow Relaxation [] Incoordination       [] Unable to contract [] Fatigues at (sec):       [] Elevated baseline between contractions    Channel B (microvolts): Use of Accessory Muscles: [] Minimal  Net rise (microvolts):   [] Moderate  [] Excessive       [] No use of accessory muscles    Fast Twitch (3 second hold, 10 second rest)  Channel A (microvolts): Quality:[] Quick/slow rise [] Low net rise  Net rise (microvolts):   [] Slow Relaxation [] Incoordination       [] Unable to contract [] Fatigues at (sec):       [] Elevated baseline between contractions    Channel B (microvolts): Use of Accessory Muscles: [] Minimal  Net rise (microvolts):   [] Moderate  [] Excessive       [] No use of accessory muscles    Optional Tests:  Lower abdominal strength: /5    Comments/Additional Tests:      OBJECTIVE    20 min eval    30 min Therapeutic Activity:  []  See flow sheet :Pt edu within scope of practice on prognosis, POC, PFM anatomy/physiology, relaxed voiding, HEP review, healthy bladder function. Rationale: Increase pelvic floor muscle strength, Improve quality of pelvic floor contractions, Decrease resting tone of the pelvic floor, Increase tissue extensibility of the pelvic floor muscles, Increase core strength, Inhibit abnormal muscle activity, and Improve lumbosacral and coccygeal mobility in order to Increase urinary continence, Decrease urinary urgency, Increase ability to delay urination, Decrease frequency of urination, Decrease nocturia, Improve frequency and ease of bowel movements, and Improve ability to perform ADLs.         min Patient Education: [x] Review HEP    [] Progressed/Changed HEP based on:   [] positioning   [] body mechanics   [] transfers   [] heat/ice application Other Objective/Functional Measures:   []baseline resting tone: []slow twitch mms   []fast twitch mms      Pain Level (0-10 scale) post treatment: 0/10    ASSESSMENT/Changes in Function: see POC please    []  Decrease # of leaks   [] No change []  Improving [] Resolved     []  Decrease hypertonus [] No change []  Improving [] Resolved     []  Increase void interval [] No change []  Improving [] Resolved     []  Increase PF strength [] No change []  Improving [] Resolved     []  Increase PF endurance [] No change []  Improving [] Resolved     []  Increase endurance [] No change []  Improving [] Resolved     []  Decrease # of pads [] No change []  Improving [] Resolved     []  Decrease pain [] No change []  Improving [] Resolved       Patient will continue to benefit from skilled PT services to modify and progress therapeutic interventions, address functional mobility deficits, address ROM deficits, address strength deficits, analyze and address soft tissue restrictions, analyze and cue movement patterns, analyze and modify body mechanics/ergonomics, assess and modify postural abnormalities, address imbalance/dizziness and instruct in home and community integration to attain remaining goals.      [x]  See Plan of Care         PLAN  [x]  Upgrade activities as tolerated     [x]  Continue plan of care  []  Update interventions per flow sheet       []  Discharge due to:_  []  Other:_        Jefferson Clinton 1/20/2023  7:45 AM

## 2023-01-27 ENCOUNTER — HOSPITAL ENCOUNTER (OUTPATIENT)
Dept: PHYSICAL THERAPY | Age: 73
Discharge: HOME | End: 2023-01-27
Payer: MEDICARE

## 2023-01-27 PROCEDURE — 97530 THERAPEUTIC ACTIVITIES: CPT

## 2023-01-27 PROCEDURE — 97112 NEUROMUSCULAR REEDUCATION: CPT

## 2023-01-27 NOTE — PROGRESS NOTES
PF DAILY TREATMENT NOTE 3-16    Patient Name: Wing Dates  Date:2023  : 1950  [x]  Patient  Verified  Payor: [place] MEDICARE / Plan: [place] MEDICARE PART A & B / Product Type: Medicare /    In time:11:05  Out time: 12:02  Total Treatment Time (min): 57  Visit #: 2 of     Medicare/BCBS Only   Total Timed Codes (min):  57 1:1 Treatment Time:  62     Treatment Area: [x] Pelvic Floor     [] Other:    SUBJECTIVE  Pain Level (0-10 scale): 0/10  Any medication changes, allergies to medications, adverse drug reactions, diagnosis change, or new procedure performed?: [x] No    [] Yes (see summary sheet for update)  Subjective functional status/changes:   [] No changes reported  Pt reports only able to do 1 day of bladder diary. She also has some questions with 1 onf her HEP. OBJECTIVE      17 min Therapeutic Activity:  [x]  See flow sheet :    []  Increase Tissue extensibility        []  Assess fiber intake    [x]  Assess voiding habits  [x]  Assess bowel habits  []  Other:   Rationale: Increase pelvic floor muscle strength, Improve quality of pelvic floor contractions, Decrease resting tone of the pelvic floor, Increase tissue extensibility of the pelvic floor muscles, Increase core strength, Inhibit abnormal muscle activity, and Improve lumbosacral and coccygeal mobility in order to Increase urinary continence, Decrease urinary urgency, Increase ability to delay urination, Decrease frequency of urination, Improve frequency and ease of bowel movements, and Improve ability to perform ADLs.       40 min Neuromuscular Re-education:  []  See flow sheet :   [x]  Pelvic floor strengthening                 []  Pelvic floor downtraining  []  Quality pelvic floor contractions       []  Relaxation techniques  [x]  Urge suppression exercises  [x]  Other: voiding mechanics  Rationale: Increase pelvic floor muscle strength, Improve quality of pelvic floor contractions, Decrease resting tone of the pelvic floor, Increase tissue extensibility of the pelvic floor muscles, Increase core strength, Inhibit abnormal muscle activity, and Improve lumbosacral and coccygeal mobility in order to Increase urinary continence, Decrease urinary urgency, Increase ability to delay urination, Decrease frequency of urination, Improve frequency and ease of bowel movements, and Improve ability to perform ADLs. With   [] TE   [] TA   [] neuro  [] manual   [] other: Patient Education: [x] Review HEP    [] Progressed/Changed HEP based on:   [] positioning   [] body mechanics   [] transfers   [] heat/ice application    [] other:      Other Objective/Functional Measures:   []baseline resting tone:   []slow twitch mms   []fast twitch mms    Pain Level (0-10 scale) post treatment: 0/10    ASSESSMENT/Changes in Function: Voiding interval is WNL in the afternoon; pt sometimes voids bladder 1x/15-30 min in the morning. Deferred internal assessment as pt has questions with HEP & urge management, she verbalized good understanding at the end of session. Will progress with internal assessment & manual as tolerated next visit.       []  Decrease # of leaks   [] No change []  Improving [] Resolved     []  Decrease hypertonus [] No change []  Improving [] Resolved     []  Increase void interval [] No change []  Improving [] Resolved     []  Increase PF strength [] No change []  Improving [] Resolved     []  Increase PF endurance [] No change []  Improving [] Resolved     []  Increase endurance [] No change []  Improving [] Resolved     []  Decrease # of pads [] No change []  Improving [] Resolved     []  Decrease pain [] No change []  Improving [] Resolved     []  Increased coordination [] No change []  Improving [] Resolved     []  Increased Bowel Frequency [] No change []  Improving [] Resolved       Patient will continue to benefit from skilled PT services to modify and progress therapeutic interventions, address functional mobility deficits, address ROM deficits, address strength deficits, analyze and address soft tissue restrictions, analyze and cue movement patterns, analyze and modify body mechanics/ergonomics, assess and modify postural abnormalities, address imbalance/dizziness, and instruct in home and community integration to attain remaining goals. [x]  See Plan of Care  []  See progress note/recertification  []  See Discharge Summary         Progress towards goals / Updated goals:  Short Term Goals: To be accomplished in 4 weeks:  1. Patient will demonstrate accurate performance of home exercise program/pelvic floor contractions as adjunct to physical therapy clinic visits to promote healthy lifestyle and improved quality of life. Eval status: will review at next visit  Current: good understanding 1-27-22     2. Patient will Complete Bladder Diary for use in patient education and goal setting. Eval status: given diary     3. Patient will improve voiding interval to 1.5-2 hours improve her QOL. Eval status: 30-45 min     4. Patient will report at least 25% improvement with voiding/emptying bladder to improve her QOL. Eval status: incomplete voiding, urgency returns quickly after voiding     Long Term Goals: To be accomplished in 8  weeks:  1. Patient will demonstrate independence in HEP for maintenance of pelvic floor program and improved quality of life. Eval status: will review at next visit     2. Patient will have decreased leakage episodes to </=1 per week for increased quality of life. Eval status: 1-2x/week with coughing/sneezing     3. Patient will demonstrate ~2 hour voiding interval and report at least 60% improvement with voiding/emptying bladder to improve her QOL. Eval status: 30-45 min voiding, incomplete voiding sensation, urgency returns quickly after voiding     4. Patient will have increased pelvic floor muscle motor performance by 1/2 to 1 grade for improved urinary continence and quality of life. Eval status: TBD     5.  Patient will have FOTO Urinary Problem score change of 4 points or more indicating improvement in function for icreased quality of life.   Eval status: 58       PLAN  [x]  Upgrade activities as tolerated     [x]  Continue plan of care  []  Update interventions per flow sheet       []  Discharge due to:_  []  Other:_      Otiliaeric Diaz 1/27/2023  9:42 AM    Future Appointments   Date Time Provider Meghna Moreno   1/27/2023 11:00 AM Eloise Kam [id number]   2/2/2023  9:00 AM Eugenio Harada, Thienphuc M [id number]   2/6/2023  9:00 AM Eugenio Harada, Thienphuc M [facility]   2/17/2023  9:00 AM Eugenio Harada, Thienphuc M [facility]   2/24/2023  9:00 AM Eugenio Harada, Thienphuc M [facility]

## 2023-02-02 ENCOUNTER — HOSPITAL ENCOUNTER (OUTPATIENT)
Dept: PHYSICAL THERAPY | Age: 73
Discharge: HOME | End: 2023-02-02
Payer: MEDICARE

## 2023-02-02 PROCEDURE — 97530 THERAPEUTIC ACTIVITIES: CPT

## 2023-02-02 PROCEDURE — 97112 NEUROMUSCULAR REEDUCATION: CPT

## 2023-02-02 NOTE — PROGRESS NOTES
PF DAILY TREATMENT NOTE 3-16    Patient Name: David Melendrez  Date:2023  : 1950  [x]  Patient  Verified  Payor: [place] MED[place]ARE / Plan: [place] MEDICARE PART A & B / Product Type: Medicare /    In time: 9:06  Out time:10:03  Total Treatment Time (min): 62  Visit #: 3 of     Medicare/BCBS Only   Total Timed Codes (min):  57 1:1 Treatment Time:  62     Treatment Area: [x] Pelvic Floor     [] Other:    SUBJECTIVE  Pain Level (0-10 scale): 0/10  Any medication changes, allergies to medications, adverse drug reactions, diagnosis change, or new procedure performed?: [x] No    [] Yes (see summary sheet for update)  Subjective functional status/changes:   [] No changes reported  Pt reports trying to decrease tea which helps a little bit with her urgency. OBJECTIVE    32 min Therapeutic Activity:  []  See flow sheet :    [x]  Increase Tissue extensibility        [x]  Assess fiber intake    [x]  Assess voiding habits  [x]  Assess bowel habits  [x]  Other: self MFR, pelvic wand use   Rationale: Increase pelvic floor muscle strength, Improve quality of pelvic floor contractions, Decrease resting tone of the pelvic floor, Increase tissue extensibility of the pelvic floor muscles, Increase core strength, Inhibit abnormal muscle activity, and Improve lumbosacral and coccygeal mobility in order to Increase urinary continence, Decrease urinary urgency, Increase ability to delay urination, Decrease frequency of urination, Decrease nocturia, and Improve ability to perform ADLs.     25 min Neuromuscular Re-education:  []  See flow sheet :   []  Pelvic floor strengthening                 []  Pelvic floor downtraining  [x]  Quality pelvic floor contractions       [x]  Relaxation techniques  [x]  Urge suppression exercises  []  Other:  Rationale: Increase pelvic floor muscle strength, Improve quality of pelvic floor contractions, Decrease resting tone of the pelvic floor, Increase tissue extensibility of the pelvic floor muscles, Increase core strength, Inhibit abnormal muscle activity, and Improve lumbosacral and coccygeal mobility in order to Increase urinary continence, Decrease urinary urgency, Increase ability to delay urination, Decrease frequency of urination, Decrease nocturia, and Improve ability to perform ADLs. With   [] TE   [] TA   [] neuro  [] manual   [] other: Patient Education: [x] Review HEP    [] Progressed/Changed HEP based on:   [] positioning   [] body mechanics   [] transfers   [] heat/ice application    [] other:      Other Objective/Functional Measures:   []baseline resting tone:   []slow twitch mms   []fast twitch mms    Musculoskeletal Screen:     Skin Integrity:  [x] Healthy       [] Red             [] Labia Atrophy        [] Fragile     Sensation:       [x] Intact          [] Diminished:     Muscle Bulk:    [x] Symmetrical  [] Well-developed   [] Atrophied:  []L   []R   []B     Prolapse:  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 Noted instability with bladder, fair strength & endurance of PFM. Pt demonstrates good form with all stretching & relaxation exercises. Will progress as tolerated.        []  Decrease # of leaks   [] No change []  Improving [] Resolved     []  Decrease hypertonus [] No change []  Improving [] Resolved     []  Increase void interval [] No change []  Improving [] Resolved     []  Increase PF strength [] No change []  Improving [] Resolved     []  Increase PF endurance [] No change []  Improving [] Resolved     []  Increase endurance [] No change []  Improving [] Resolved     []  Decrease # of pads [] No change []  Improving [] Resolved     []  Decrease pain [] No change []  Improving [] Resolved     []  Increased coordination [] No change []  Improving [] Resolved     []  Increased Bowel Frequency [] No change []  Improving [] Resolved       Patient will continue to benefit from skilled PT services to modify and progress therapeutic interventions, address functional mobility deficits, address ROM deficits, address strength deficits, analyze and address soft tissue restrictions, analyze and cue movement patterns, analyze and modify body mechanics/ergonomics, assess and modify postural abnormalities, address imbalance/dizziness, and instruct in home and community integration to attain remaining goals. [x]  See Plan of Care  []  See progress note/recertification  []  See Discharge Summary           Progress towards goals / Updated goals:  Short Term Goals: To be accomplished in 4 weeks:  1. Patient will demonstrate accurate performance of home exercise program/pelvic floor contractions as adjunct to physical therapy clinic visits to promote healthy lifestyle and improved quality of life. Eval status: will review at next visit  Current: good understanding 1-27-22     2. Patient will Complete Bladder Diary for use in patient education and goal setting. Eval status: given diary     3. Patient will improve voiding interval to 1.5-2 hours improve her QOL. Eval status: 30-45 min     4. Patient will report at least 25% improvement with voiding/emptying bladder to improve her QOL. Eval status: incomplete voiding, urgency returns quickly after voiding     Long Term Goals: To be accomplished in 8  weeks:  1. Patient will demonstrate independence in HEP for maintenance of pelvic floor program and improved quality of life. Eval status: will review at next visit     2.  Patient will have decreased leakage episodes to </=1 per week for increased quality of life. Eval status: 1-2x/week with coughing/sneezing     3. Patient will demonstrate ~2 hour voiding interval and report at least 60% improvement with voiding/emptying bladder to improve her QOL. Eval status: 30-45 min voiding, incomplete voiding sensation, urgency returns quickly after voiding     4. Patient will have increased pelvic floor muscle motor performance by 1/2 to 1 grade for improved urinary continence and quality of life. Eval status: 2/5     5. Patient will have FOTO Urinary Problem score change of 4 points or more indicating improvement in function for icreased quality of life.   Eval status: 58      PLAN  [x]  Upgrade activities as tolerated     [x]  Continue plan of care  []  Update interventions per flow sheet       []  Discharge due to:_  []  Other:_      Michele Acosta 2/2/2023  8:32 AM    Future Appointments   Date Time Provider Meghna Moreno   2/2/2023  9:00 AM Laine Brown [facility]   2/6/2023  9:00 AM Abhi Alvarez [facility]   2/17/2023  9:00 AM Abhi Wyatt [facility]   2/24/2023  9:00 AM Abhi Wyatt [facility]

## 2023-02-06 ENCOUNTER — HOSPITAL ENCOUNTER (OUTPATIENT)
Dept: PHYSICAL THERAPY | Age: 73
Discharge: HOME | End: 2023-02-06
Payer: MEDICARE

## 2023-02-06 PROCEDURE — 97530 THERAPEUTIC ACTIVITIES: CPT

## 2023-02-06 PROCEDURE — 97112 NEUROMUSCULAR REEDUCATION: CPT

## 2023-02-06 PROCEDURE — 97110 THERAPEUTIC EXERCISES: CPT

## 2023-02-06 NOTE — PROGRESS NOTES
PF DAILY TREATMENT NOTE 3-16    Patient Name: Lucio Justice  Date:2023  : 1950  [x]  Patient  Verified  Payor: [place] MEDICARE / Plan: [place] MEDICARE PART A & B / Product Type: Medicare /    In time: 9:04  Out time: 9:59  Total Treatment Time (min): 55  Visit #: 4 of     Medicare/BCBS Only   Total Timed Codes (min):  55 1:1 Treatment Time:  55     Treatment Area: [x] Pelvic Floor     [] Other:    SUBJECTIVE  Pain Level (0-10 scale): 0/10  Any medication changes, allergies to medications, adverse drug reactions, diagnosis change, or new procedure performed?: [x] No    [] Yes (see summary sheet for update)  Subjective functional status/changes:   [] No changes reported  Pt reports doing ok so far. OBJECTIVE      25 min Therapeutic Exercise:  [x] See flow sheet :   [x]  Pelvic floor strengthening                 []  Pelvic floor downtraining  []  Quality pelvic floor contractions       [x]  Relaxation techniques  []  Urge suppression exercises  [x]  Other: core & hips strengthening   Rationale: Increase pelvic floor muscle strength, Improve quality of pelvic floor contractions, Decrease resting tone of the pelvic floor, Increase tissue extensibility of the pelvic floor muscles, Increase core strength, Inhibit abnormal muscle activity, and Improve lumbosacral and coccygeal mobility in order to Increase urinary continence, Decrease urinary urgency, Increase ability to delay urination, Decrease frequency of urination, Decrease nocturia, and Improve ability to perform ADLs.     15 min Therapeutic Activity:  []  See flow sheet :    []  Increase Tissue extensibility        []  Assess fiber intake    []  Assess voiding habits  []  Assess bowel habits  []  Other:   Rationale: Increase pelvic floor muscle strength, Improve quality of pelvic floor contractions, Decrease resting tone of the pelvic floor, Increase tissue extensibility of the pelvic floor muscles, Increase core strength, Inhibit abnormal muscle activity, and Improve lumbosacral and coccygeal mobility in order to Increase urinary continence, Decrease urinary urgency, Increase ability to delay urination, Decrease frequency of urination, Decrease nocturia, and Improve ability to perform ADLs. 15 min Neuromuscular Re-education:  []  See flow sheet :   []  Pelvic floor strengthening                 []  Pelvic floor downtraining  [x]  Quality pelvic floor contractions       [x]  Relaxation techniques  []  Urge suppression exercises  []  Other:  Rationale: Increase pelvic floor muscle strength, Improve quality of pelvic floor contractions, Decrease resting tone of the pelvic floor, Increase tissue extensibility of the pelvic floor muscles, Increase core strength, Inhibit abnormal muscle activity, and Improve lumbosacral and coccygeal mobility in order to Increase urinary continence, Decrease urinary urgency, Increase ability to delay urination, Decrease frequency of urination, Decrease nocturia, and Improve ability to perform ADLs. With   [] TE   [] TA   [] neuro  [] manual   [] other: Patient Education: [x] Review HEP    [] Progressed/Changed HEP based on:   [] positioning   [] body mechanics   [] transfers   [] heat/ice application    [] other:      Other Objective/Functional Measures:   []baseline resting tone:   []slow twitch mms   []fast twitch mms    Pain Level (0-10 scale) post treatment: 0/10    ASSESSMENT/Changes in Function: Pt demonstrates fairly good form with strengthening therex. Reports good compliance with HEP, deemed to be challenged with urge suppression in the morning. Will cont with manual as tolerated next visit.      []  Decrease # of leaks   [] No change []  Improving [] Resolved     []  Decrease hypertonus [] No change []  Improving [] Resolved     []  Increase void interval [] No change []  Improving [] Resolved     []  Increase PF strength [] No change []  Improving [] Resolved     []  Increase PF endurance [] No change [] Improving [] Resolved     []  Increase endurance [] No change []  Improving [] Resolved     []  Decrease # of pads [] No change []  Improving [] Resolved     []  Decrease pain [] No change []  Improving [] Resolved     []  Increased coordination [] No change []  Improving [] Resolved     []  Increased Bowel Frequency [] No change []  Improving [] Resolved       Patient will continue to benefit from skilled PT services to modify and progress therapeutic interventions, address functional mobility deficits, address ROM deficits, address strength deficits, analyze and address soft tissue restrictions, analyze and cue movement patterns, analyze and modify body mechanics/ergonomics, assess and modify postural abnormalities, address imbalance/dizziness, and instruct in home and community integration to attain remaining goals. []  See Plan of Care  [x]  See progress note/recertification  []  See Discharge Summary         Progress towards goals / Updated goals:  Short Term Goals: To be accomplished in 4 weeks:  1. Patient will demonstrate accurate performance of home exercise program/pelvic floor contractions as adjunct to physical therapy clinic visits to promote healthy lifestyle and improved quality of life. Eval status: will review at next visit  Current: good understanding 1-27-22     2. Patient will Complete Bladder Diary for use in patient education and goal setting. Eval status: given diary  Current: met 2-6-23    3. Patient will improve voiding interval to 1.5-2 hours improve her QOL. Eval status: 30-45 min  Current: challenged with urge suppression in the morning, WNL with voiding interval in the afternoon 2-6-23     4. Patient will report at least 25% improvement with voiding/emptying bladder to improve her QOL. Eval status: incomplete voiding, urgency returns quickly after voiding     Long Term Goals: To be accomplished in 8  weeks:  1.  Patient will demonstrate independence in HEP for maintenance of pelvic floor program and improved quality of life. Eval status: will review at next visit     2. Patient will have decreased leakage episodes to </=1 per week for increased quality of life. Eval status: 1-2x/week with coughing/sneezing     3. Patient will demonstrate ~2 hour voiding interval and report at least 60% improvement with voiding/emptying bladder to improve her QOL. Eval status: 30-45 min voiding, incomplete voiding sensation, urgency returns quickly after voiding     4. Patient will have increased pelvic floor muscle motor performance by 1/2 to 1 grade for improved urinary continence and quality of life. Eval status: 2/5     5. Patient will have FOTO Urinary Problem score change of 4 points or more indicating improvement in function for icreased quality of life.   Eval status: 58    PLAN  [x]  Upgrade activities as tolerated     [x]  Continue plan of care  []  Update interventions per flow sheet       []  Discharge due to:_  []  Other:_      Hai Mcgovern 2/6/2023  7:46 AM    Future Appointments   Date Time Provider Meghna Moreno   2/6/2023  9:00 AM Virl Pals [id number]   2/17/2023  9:00 AM Abhi Alvarez [facility]   2/24/2023  9:00 AM Abhi Hopkins [facility]

## 2023-02-07 NOTE — PROGRESS NOTES
Patient: Josephine Plata                MRN: [id number]       SSN: [id number]  YOB: 1950        AGE: 72 y.o. SEX: female          PCP: Rebel An MD  07/14/20    Chief Complaint   Patient presents with    Hip Pain     Lt       HISTORY:  Josephine Plata is a 72 y.o. female presents to the office complaining of several month history of worsening left hip pain. She is unable to lay on her left side secondary to pressure pain over the upper outer hip. She denies any trauma to the left hip. Her hip pain limits her ability to only stand for short periods and walk short distances. She has pain that radiates into her growing primarily when she is rising from a seated position or sitting from a standing position. Pain Assessment  7/14/2020   Location of Pain Hip   Pain Location Comment -   Location Modifiers Left   Severity of Pain 7   Quality of Pain Sharp   Duration of Pain Persistent   Frequency of Pain Constant   Aggravating Factors Bending;Straightening;Exercise;Kneeling;Squatting;Standing;Walking;Stairs; Other (Comment)   Aggravating Factors Comment -   Limiting Behavior Yes   Relieving Factors Rest;Ice   Result of Injury Yes           No results found for: HBA1C, HGBE8, IOV1IQJX, AVR2KXJJ  Weight Metrics 7/14/2020 6/23/2020 6/9/2020 5/18/2020 11/6/2019 12/11/2017 10/11/2017   Weight 141 lb 138 lb 141 lb 141 lb 9.6 oz 135 lb 135 lb 134 lb   BMI 24.98 kg/m2 24.45 kg/m2 24.98 kg/m2 25.08 kg/m2 23.91 kg/m2 23.91 kg/m2 23.74 kg/m2            Problem List Items Addressed This Visit     None      Visit Diagnoses     Pain in joint of left hip    -  Primary    Relevant Orders    AMB POC X-RAY RADEX HIP UNI WITH PELVIS 2-3 VIEWS (Completed)    TRIAMCINOLONE ACETONIDE INJ    DRAIN/INJECT LARGE JOINT/BURSA    Trochanteric bursitis of left hip        Relevant Orders    TRIAMCINOLONE ACETONIDE INJ    DRAIN/INJECT LARGE JOINT/BURSA          PAST MEDICAL HISTORY:   Past Pt's primary care office and Hepatology will make contact with pt to schedule his hospital f/u visit.   Medical History:   Diagnosis Date    Anal fissure     GERD (gastroesophageal reflux disease)     H/O: hysterectomy 02/12/1998       PAST SURGICAL HISTORY:   Past Surgical History:   Procedure Laterality Date    BREAST SURGERY PROCEDURE UNLISTED      reduction    HX ENDOSCOPY      HX GI      colonoscopy, EGD    HX GI      ANAL FISSURE    HX GYN      hysterectomy    HX HYSTERECTOMY  02/12/1998       ALLERGIES:   Allergies   Allergen Reactions    Doxycycline Rash    Penicillin G Rash        CURRENT MEDICATIONS:  A list of medications prior to the time of admission include:  Prior to Admission medications    Medication Sig Start Date End Date Taking? Authorizing Provider   triamcinolone acetonide (Kenalog) 40 mg/mL injection 1 mL by Intra artICUlar route once for 1 dose. 7/14/20 7/14/20 Yes Brooke Martinez PA-C   alum-mag hydroxide-simeth (MYLANTA) 200-200-20 mg/5 mL susp Take 30 mL by mouth every four (4) hours as needed. Yes Provider, Historical   calcium carbonate (TUMS) 200 mg calcium (500 mg) chew Take 1 Tab by mouth daily. Yes Provider, Historical   loratadine (CLARITIN) 10 mg tablet Take 10 mg by mouth daily. Yes Provider, Historical   sucralfate (CARAFATE) 1 gram tablet Take 1 g by mouth four (4) times daily. Provider, Historical   diclofenac (VOLTAREN) 1 % gel Apply 4 g to affected area two (2) times a day. Apply to affected lateral ankle twice daily 10/11/17   Gregg Callahan MD   ranitidine (ZANTAC) 150 mg tablet Take 150 mg by mouth two (2) times a day.     Provider, Historical       FAMILY HISTORY:   Family History   Problem Relation Age of Onset    Diabetes Father     Diabetes Sister     Hypertension Mother     Stroke Mother     Hypertension Sister        SOCIAL HISTORY:   Social History     Socioeconomic History    Marital status:      Spouse name: Not on file    Number of children: Not on file    Years of education: Not on file    Highest education level: Not on file   Tobacco Use    Smoking status: Never Smoker    Smokeless tobacco: Never Used   Substance and Sexual Activity    Alcohol use: No     Alcohol/week: 0.0 standard drinks    Drug use: No       ROS:No CP, No SOB, No fever/chills nor night sweats. No headaches, vision abnormalities to include double and oral loss of vision. No hearing abnormalities. Musculoskeletal pain per HPI. Pain is exacerbated positionally. Pt denies h/o spinal surgery, injections, or PT/chiropractor. Self treated with less than adequate relief on oral antiinflammatories. . Pt denies change in bowel or bladder habits. Pt denies fever, weight loss, or skin changes. EXAM:  Patient alert and oriented x 3,   CN II-XII grossly intact  Sitting comfortably in the exam room, interacting with conversation with pleasant affect. Breathing appears regular effortless with no visible usage of accessory muscles  Distal cap refill intact at 2/2 Dwain UE / LE. Neuro intact Dwain UE/LE to noxious stimuli    Ortho Specific exam:    Left hip reveals proximal lateral skin intact. No warmth erythema edema ecchymosis or effusion. The patient laying right recumbent left hip reveals pronounced tenderness over the greater trochanteric region with radiation distal down the IT band estimated at 12 cm. Her passive internal and external rotation of the left hip while right recumbent 12 and 15 degrees with pain reproduced over the greater trochanteric region. Quad and femoral nerve activity full left lower extremity. No calf tenderness or evidence of DVT left lower extremity. Distal sensation intact fully [facility] lower [facility] femoral acetabular narrowing consistent with osteoarthritis. No fracture deformities lesions mass or step-offs. No soft tissue calcifications. IMPRESSION:      ICD-10-CM ICD-9-CM    1.  Pain in joint of left hip  M25.552 719.45 AMB POC X-RAY RADEX HIP UNI WITH PELVIS 2-3 VIEWS      TRIAMCINOLONE ACETONIDE INJ      DRAIN/INJECT LARGE JOINT/BURSA   2. Trochanteric bursitis of left hip  M70.62 726.5 TRIAMCINOLONE ACETONIDE INJ      DRAIN/INJECT LARGE JOINT/BURSA        PLAN: Currently recommending a low-dose cortisone injection for her trochanteric bursal symptoms. Also we will follow-up with physical therapy x2 sessions with a home exercise program.    Chart reviewed for the following:   IShasha PA-C, have reviewed the History, Physical and updated the Allergic reactions for [address] performed immediately prior to start of procedure:   IAlice PA-C, have performed the following reviews on Franki Gerard prior to the start of the procedure:            * Patient was identified by name and date of birth   * Agreement on procedure being performed was verified  * Risks and Benefits explained to the patient  * Procedure site verified and marked as necessary  * Patient was positioned for comfort  * Consent was signed and verified             Date of procedure: 07/14/20    Time: 2:58 PM    Procedure performed by:  Jerome Aguero PA-C    Provider assisted by: None     Patient assisted by: self    How tolerated by patient: tolerated the procedure well with no complications    Comments: none    Procedure: Using sterile technique a verbal and written consent were obtained and appropriate timeout performed patient laying right recumbent left hip draped appropriately with my chaperone Vania WATT present 1 cc of Kenalog at 40 mg/mL mixed with 7 mils of Sensorcaine 0.75% injected over the point of maximal tenderness consistent with the greater trochanteric region. There were no complications. Patient tolerated the procedure well. Patient provided a reminder for a \"due or due soon\" health maintenance.  I have asked the patient to schedule an appointment with their primary care provider for follow-up on general health maintenance concerns. Today all the patient's questions were answered to their satisfaction. Copies of x-rays reviewed if obtained this visit, and provided to patient. Dictation disclaimer:  Please note that this dictation was completed with [employer], the computer voice recognition software. Quite often unanticipated grammatical, syntax, homophones, and other interpretive errors are inadvertently transcribed by the computer software. Please disregard these errors. Please excuse any errors that have escaped final proofreading. Bre CRUZ, APC, MPAS, PA-C  [facility]

## 2023-02-17 ENCOUNTER — HOSPITAL ENCOUNTER (OUTPATIENT)
Facility: HOSPITAL | Age: 73
Setting detail: RECURRING SERIES
Discharge: HOME | End: 2023-02-20
Payer: MEDICARE

## 2023-02-17 ENCOUNTER — APPOINTMENT (OUTPATIENT)
Dept: PHYSICAL THERAPY | Age: 73
End: 2023-02-17
Payer: MEDICARE

## 2023-02-17 PROCEDURE — 97530 THERAPEUTIC ACTIVITIES: CPT

## 2023-02-17 PROCEDURE — 97110 THERAPEUTIC EXERCISES: CPT

## 2023-02-17 NOTE — PROGRESS NOTES
PF DAILY TREATMENT NOTE (2023)      Patient Name: Arely Okeefe    Date: 2023    : 1950  Insurance: Payor: MEDICARE / Plan: MEDICARE PART A AND B / Product Type: *No Product type* /      Patient  verified yes     Visit #   Current / Total 5 8-12   Time   In / Out 9:06 10:02   Pain   In / Out 0/10 0/10   Subjective Functional Status/Changes: Pt reports min improvement with    Changes to:  Meds, Allergies, Med Hx, Sx Hx? If yes, update Summary List no     Timed Treatment Min: 56 min  1:1 Timed Treatment Min: 56 min    TREATMENT AREA =  Pelvic floor dysfunction [M62.89]      OBJECTIVE    25 min Therapeutic Exercise:  [x] See flow sheet :   [x]  Pelvic floor strengthening                 []  Pelvic floor downtraining  []  Quality pelvic floor contractions       [x]  Relaxation techniques  []  Urge suppression exercises  []  Other:  Rationale: Increase pelvic floor muscle strength, Improve quality of pelvic floor contractions, Decrease resting tone of the pelvic floor, Increase tissue extensibility of the pelvic floor muscles, Increase core strength, Inhibit abnormal muscle activity, and Improve lumbosacral and coccygeal mobility in order to Increase urinary continence, Decrease urinary urgency, Increase ability to delay urination, Decrease frequency of urination, and Improve ability to perform ADLs.       31 min Therapeutic Activity:  []  See flow sheet :    []  Increase Tissue extensibility        [x]  Assess fiber intake    [x]  Assess voiding habits  [x]  Assess bowel habits  []  Other:   Rationale: Increase pelvic floor muscle strength, Improve quality of pelvic floor contractions, Decrease resting tone of the pelvic floor, Increase tissue extensibility of the pelvic floor muscles, Increase core strength, Inhibit abnormal muscle activity, and Improve lumbosacral and coccygeal mobility in order to Increase urinary continence, Decrease urinary urgency, Increase ability to delay urination, Decrease frequency of urination, and Improve ability to perform ADLs. With   [] TE   [] TA   [] neuro  [] manual   [] other: Patient Education: [x] Review HEP    [] Progressed/Changed HEP based on:   [] positioning   [] body mechanics   [] transfers   [] heat/ice application    [] other:      Other Objective/Functional Measures:   []baseline resting tone:   []slow twitch mms   []fast twitch mms      ASSESSMENT/Changes in Function: see Re-cert please      []  Decrease # of leaks   [] No change []  Improving [] Resolved     []  Decrease hypertonus [] No change []  Improving [] Resolved     []  Increase void interval [] No change []  Improving [] Resolved     []  Increase PF strength [] No change []  Improving [] Resolved     []  Increase PF endurance [] No change []  Improving [] Resolved     []  Increase endurance [] No change []  Improving [] Resolved     []  Decrease # of pads [] No change []  Improving [] Resolved     []  Decrease pain [] No change []  Improving [] Resolved     []  Increased coordination [] No change []  Improving [] Resolved     []  Increased Bowel Frequency [] No change []  Improving [] Resolved       Patient will continue to benefit from skilled PT / OT services to Rationale: Increase pelvic floor muscle strength, Improve quality of pelvic floor contractions, Decrease resting tone of the pelvic floor, Increase tissue extensibility of the pelvic floor muscles, Increase core strength, Inhibit abnormal muscle activity, and Improve lumbosacral and coccygeal mobility to address functional deficits and attain remaining goals. []  See Plan of Care  [x]  See progress note/recertification  []  See Discharge Summary           Progress towards goals / Updated goals:  Short Term Goals: To be accomplished in 4 weeks:  1.  Patient will demonstrate accurate performance of home exercise program/pelvic floor contractions as adjunct to physical therapy clinic visits to promote healthy lifestyle and improved quality of life. Eval status: will review at next visit  Current: good understanding 1-27-22; good compliance 2-     2. Patient will Complete Bladder Diary for use in patient education and goal setting. Eval status: given diary  Current: met 2-6-23     3. Patient will improve voiding interval to 1.5-2 hours improve her QOL. Eval status: 30-45 min  Current: challenged with urge suppression in the morning, WNL with voiding interval in the afternoon 2-6-23; progressing gradually, 45-50 min 2-     4. Patient will report at least 25% improvement with voiding/emptying bladder to improve her QOL. Eval status: incomplete voiding, urgency returns quickly after voiding  Current: progressing gradually, 20% 2-     Long Term Goals: To be accomplished in 8  weeks:  1. Patient will demonstrate independence in HEP for maintenance of pelvic floor program and improved quality of life. Eval status: will review at next visit  Current: good compliance 2-     2. Patient will have decreased leakage episodes to </=1 per week for increased quality of life. Eval status: 1-2x/week with coughing/sneezing  Current: progressing gradually 2-     3. Patient will demonstrate ~2 hour voiding interval and report at least 60% improvement with voiding/emptying bladder to improve her QOL. Eval status: 30-45 min voiding, incomplete voiding sensation, urgency returns quickly after voiding  Current: progressing gradually, 45-50 min 2-     4. Patient will have increased pelvic floor muscle motor performance by 1/2 to 1 grade for improved urinary continence and quality of life. Eval status: 2/5  Current: progressing gradually 2-     5. Patient will have FOTO Urinary Problem score change of 4 points or more indicating improvement in function for icreased quality of life.   Eval status: 58  Current: regressed 2 points 2-       PLAN  [x]  Upgrade activities as tolerated     [x]  Continue plan of care  [] Update interventions per flow sheet       []  Discharge due to:_  []  Other:_      Bertram Marsh, PT 2/17/2023  8:39 AM    Future Appointments   Date Time Provider Glenn Gonzalez   2/17/2023  9:00 AM Dena Bronson, PT [facility]   2/24/2023  9:00 AM Dean Bronson, PT [facility]

## 2023-02-17 NOTE — THERAPY RECERTIFICATION
[facility] at [address] [place] [address]  Ph [phone]  Fx [phone]    Continued Plan of Care/ Re-certification for Physical Therapy Services    Patient name: Cem Mendez Start of Care: 2023   Referral source: Lucero Dowling MD : 1950               Medical Diagnosis: Pelvic floor dysfunction in female [M62.89]  Payor: Alejandra Bridges / Plan: [place] PART A & B / Product Type: Medicare /  Onset Date: 6 months ago               Treatment Diagnosis: PFD, difficulty with emptying bladder, mixed UI   Prior Hospitalization: see medical history Provider#: [id number]   Medications: Verified on Patient summary List    Comorbidities: osteoporosis, arthritis   Prior Level of Function: ind with all mobility, only min leakage, house work, aerobic exercises. Visits from Start of Care: 5    Missed Visits: 0    Reporting Period: 2023 to 2023      The Plan of Care and following information is based on the patient's current status:  Short Term Goals: To be accomplished in 4 weeks:  1. Patient will demonstrate accurate performance of home exercise program/pelvic floor contractions as adjunct to physical therapy clinic visits to promote healthy lifestyle and improved quality of life. Eval status: will review at next visit  Current: good understanding 22; good compliance 2023     2. Patient will Complete Bladder Diary for use in patient education and goal setting. Eval status: given diary  Current: met 23     3. Patient will improve voiding interval to 1.5-2 hours improve her QOL. Eval status: 30-45 min  Current: challenged with urge suppression in the morning, WNL with voiding interval in the afternoon 23; progressing gradually, 45-50 min 2023     4.  Patient will report at least 25% improvement with voiding/emptying bladder to improve her QOL.  Eval status: incomplete voiding, urgency returns quickly after voiding  Current: progressing gradually, 20% 2-     Long Term Goals: To be accomplished in 8  weeks:  1. Patient will demonstrate independence in HEP for maintenance of pelvic floor program and improved quality of life. Eval status: will review at next visit  Current: good compliance 2-     2. Patient will have decreased leakage episodes to </=1 per week for increased quality of life. Eval status: 1-2x/week with coughing/sneezing  Current: progressing gradually 2-     3. Patient will demonstrate ~2 hour voiding interval and report at least 60% improvement with voiding/emptying bladder to improve her QOL. Eval status: 30-45 min voiding, incomplete voiding sensation, urgency returns quickly after voiding  Current: progressing gradually, 45-50 min 2-     4. Patient will have increased pelvic floor muscle motor performance by 1/2 to 1 grade for improved urinary continence and quality of life. Eval status: 2/5  Current: progressing gradually 2-     5. Patient will have FOTO Urinary Problem score change of 4 points or more indicating improvement in function for icreased quality of life.   Eval status: 58  Current: regressed 2 points 2-      Key functional changes: improved understanding of voiding mechanics, improving urge management, improving voiding interval of bladder      Problems/ barriers to goal attainment: bladder instability     Problem List: Pelvic pain/dysfunction, Decreased pelvic floor mm awareness, Decreased pelvic floor mm strength, Use of accessory muscles, Improper voiding habits, Hypertonus of pelvic floor, and Urinary urgency     Treatment Plan may include any combination of the following: Therapeutic exercise, Urge suppression techniques, Neuromuscular re-education, Manual therapy, Physical agent/modality, and Patient education    Patient Goal (s) has been updated and includes: empty bladder better     Goals for this certification period to be accomplished in 4-8 weeks   1. Patient will demonstrate independence in HEP for maintenance of pelvic floor program and improved quality of life. Status at Progress Note: good compliance 2-    2. Patient will have decreased leakage episodes to </=1 per week for increased quality of life. Status at Progress Note: 1-2x/week with coughing/sneezing; progressing gradually 2-    3. Patient will improve voiding interval to 1.5-2 hours improve her QOL. Status at Progress Note: progressing gradually, 45-50 min 2-     4. Patient will report at least 25% improvement with voiding/emptying bladder to improve her QOL. Status at Progress Note: progressing gradually, 20% 2-     5. Patient will have increased pelvic floor muscle motor performance by 1/2 to 1 grade for improved urinary continence and quality of life. Status at Progress Note: 2/5; progressing gradually 2-     6. Patient will have FOTO Urinary Problem score change of 4 points or more indicating improvement in function for icreased quality of life. Status at Progress Note: initial assessment: 58; regressed 2 points 2-      Frequency / Duration: Patient to be seen 1-2 times per week for 4 weeks:    Assessment / Recommendations:Pt's making gradual progress with pelvic floor PT. She reports about 20% improvement overall. Pt demonstrates good understanding with retraining her bladder to improve voiding interval in the morning. Noted good form & compliance with all exercises/HEP. Voiding mechanics improves gradually.  She would cont to benefit from skilled PT to Increase pelvic floor muscle strength, Improve quality of pelvic floor contractions, Decrease resting tone of the pelvic floor, Increase tissue extensibility of the pelvic floor muscles, Increase core strength, Inhibit abnormal muscle activity, and Improve lumbosacral and coccygeal mobility in order to Increase urinary continence, Decrease urinary urgency, Increase ability to delay urination, Decrease frequency of urination, and Improve ability to perform ADLs. Certification Period: 2- to 3-      Edgardo Ismael, PT 2/17/2023 12:17 PM    ________________________________________________________________________  I certify that the above Therapy Services are being furnished while the patient is under my care. I agree with the treatment plan and certify that this therapy is necessary. [] I have read the above and request that my patient continue as recommended.   [] I have read the above report and request that my patient continue therapy with the following changes/special instructions: _____________________________________________  [] I have read the above report and request that my patient be discharged from therapy      Physician's Signature:_________________ Date:___________Time:__________                                      Boaz Pradhan MD      Please sign and return to [facility] at [address] [place] [address]  Ph [phone]  Fx [phone]

## 2023-02-24 ENCOUNTER — APPOINTMENT (OUTPATIENT)
Dept: PHYSICAL THERAPY | Age: 73
End: 2023-02-24
Payer: MEDICARE

## 2023-02-24 ENCOUNTER — HOSPITAL ENCOUNTER (OUTPATIENT)
Facility: HOSPITAL | Age: 73
Setting detail: RECURRING SERIES
Discharge: HOME | End: 2023-02-27
Payer: MEDICARE

## 2023-02-24 PROCEDURE — 97110 THERAPEUTIC EXERCISES: CPT

## 2023-02-24 PROCEDURE — 97530 THERAPEUTIC ACTIVITIES: CPT

## 2023-02-24 NOTE — PROGRESS NOTES
PF DAILY TREATMENT NOTE (2023)      Patient Name: Mallika Dennis    Date: 2023    : 1950  Insurance: Payor: MEDICARE / Plan: MEDICARE PART A AND B / Product Type: *No Product type* /      Patient  verified yes     Visit #   Current / Total 1 4-8   Time   In / Out  9:06 10:11   Pain   In / Out 0/10 0/10   Subjective Functional Status/Changes: Pt reports doing ok. No problem with bowel, no change with other symptoms. She got the pelvic wand with vibration option. She hasn't tried it at home. Changes to:  Meds, Allergies, Med Hx, Sx Hx? If yes, update Summary List no       TREATMENT AREA =  Pelvic floor dysfunction [M62.89]      OBJECTIVE      25 min Therapeutic Exercise:  [x] See flow sheet :   [x]  Pelvic floor strengthening                 []  Pelvic floor downtraining  []  Quality pelvic floor contractions       []  Relaxation techniques  []  Urge suppression exercises  []  Other:  Rationale: Increase pelvic floor muscle strength, Improve quality of pelvic floor contractions, Decrease resting tone of the pelvic floor, Increase tissue extensibility of the pelvic floor muscles, Increase core strength, Inhibit abnormal muscle activity, and Improve lumbosacral and coccygeal mobility in order to Increase urinary continence, Decrease urinary urgency, Increase ability to delay urination, Decrease frequency of urination, Decrease nocturia, and Improve ability to perform ADLs.       40 min Therapeutic Activity:  []  See flow sheet :    []  Increase Tissue extensibility        []  Assess fiber intake    [x]  Assess voiding habits  [x]  Assess bowel habits  [x]  Other: pelvic wand use   Rationale: Increase pelvic floor muscle strength, Improve quality of pelvic floor contractions, Decrease resting tone of the pelvic floor, Increase tissue extensibility of the pelvic floor muscles, Increase core strength, Inhibit abnormal muscle activity, and Improve lumbosacral and coccygeal mobility in order to Increase urinary continence, Decrease urinary urgency, Increase ability to delay urination, Decrease frequency of urination, Decrease nocturia, and Improve ability to perform ADLs. With   [] TE   [] TA   [] neuro  [] manual   [] other: Patient Education: [x] Review HEP    [] Progressed/Changed HEP based on:   [] positioning   [] body mechanics   [] transfers   [] heat/ice application    [] other:      Other Objective/Functional Measures:   []baseline resting tone:   []slow twitch mms   []fast twitch mms      ASSESSMENT/Changes in Function:  Pt reports overall pressure/discomfort and position trigger point at 3 & 9 o' clock of her PFM. She verbalized good understanding with pelvic wand use. Demonstrated good form with all therex. Progressed HEP as tolerated (increased reps in set to 10, hold time is 10 sec, perform in sitting if able). Will progress as tolerated.      []  Decrease # of leaks   [] No change []  Improving [] Resolved     []  Decrease hypertonus [] No change []  Improving [] Resolved     []  Increase void interval [] No change []  Improving [] Resolved     []  Increase PF strength [] No change []  Improving [] Resolved     []  Increase PF endurance [] No change []  Improving [] Resolved     []  Increase endurance [] No change []  Improving [] Resolved     []  Decrease # of pads [] No change []  Improving [] Resolved     []  Decrease pain [] No change []  Improving [] Resolved     []  Increased coordination [] No change []  Improving [] Resolved     []  Increased Bowel Frequency [] No change []  Improving [] Resolved       Patient will continue to benefit from skilled PT / OT services to modify and progress therapeutic interventions, analyze and address functional mobility deficits, analyze and address ROM deficits, analyze and address strength deficits, analyze and address soft tissue restrictions, analyze and cue for proper movement patterns, analyze and modify for postural abnormalities, analyze and address imbalance/dizziness, and instruct in home and community integration to address functional deficits and attain remaining goals. []  See Plan of Care  [x]  See progress note/recertification  []  See Discharge Summary         Progress towards goals / Updated goals:     Goals for this certification period to be accomplished in 4-8 weeks   1. Patient will demonstrate independence in HEP for maintenance of pelvic floor program and improved quality of life. Status at Progress Note: good compliance 2-     2. Patient will have decreased leakage episodes to </=1 per week for increased quality of life. Status at Progress Note: 1-2x/week with coughing/sneezing; progressing gradually 2-     3. Patient will improve voiding interval to 1.5-2 hours improve her QOL. Status at Progress Note: progressing gradually, 45-50 min 2-     4. Patient will report at least 25% improvement with voiding/emptying bladder to improve her QOL. Status at Progress Note: progressing gradually, 20% 2-     5. Patient will have increased pelvic floor muscle motor performance by 1/2 to 1 grade for improved urinary continence and quality of life. Status at Progress Note: 2/5; progressing gradually 2-     6. Patient will have FOTO Urinary Problem score change of 4 points or more indicating improvement in function for icreased quality of life.   Status at Progress Note: initial assessment: 58; regressed 2 points 2-       PLAN  [x]  Upgrade activities as tolerated     [x]  Continue plan of care  []  Update interventions per flow sheet       []  Discharge due to:_  []  Other:_      Vijaya Boyle PT 2/24/2023  7:42 AM    Future Appointments   Date Time Provider Glenn Gonzalez   2/24/2023  9:00 AM [facility]

## 2023-02-27 ENCOUNTER — HOSPITAL ENCOUNTER (OUTPATIENT)
Facility: HOSPITAL | Age: 73
Setting detail: RECURRING SERIES
Discharge: HOME | End: 2023-03-02
Payer: MEDICARE

## 2023-02-27 PROCEDURE — 97112 NEUROMUSCULAR REEDUCATION: CPT

## 2023-02-27 PROCEDURE — 97530 THERAPEUTIC ACTIVITIES: CPT

## 2023-02-27 PROCEDURE — 97110 THERAPEUTIC EXERCISES: CPT

## 2023-02-27 NOTE — PROGRESS NOTES
PF DAILY TREATMENT NOTE (2023)      Patient Name: Lili Bhakta    Date: 2023    : 1950  Insurance: Payor: MEDICARE / Plan: MEDICARE PART A AND B / Product Type: *No Product type* /      Patient  verified yes     Visit #   Current / Total 2 4-8   Time   In / Out 12:12 12:58   Pain   In / Out 0/10 0/10   Subjective Functional Status/Changes: Pt reports doing ok. She didn't use the pelvic wand during the weekend. Changes to:  Meds, Allergies, Med Hx, Sx Hx? If yes, update Summary List no       TREATMENT AREA =  Pelvic floor dysfunction [M62.89]      OBJECTIVE      26 min Therapeutic Exercise:  [x] See flow sheet :   []  Pelvic floor strengthening                 [x]  Pelvic floor downtraining  []  Quality pelvic floor contractions       [x]  Relaxation techniques  []  Urge suppression exercises  []  Other:  Rationale: Decrease resting tone of the pelvic floor, Increase tissue extensibility of the pelvic floor muscles, Inhibit abnormal muscle activity, and Improve lumbosacral and coccygeal mobility in order to Increase urinary continence, Decrease nocturia, Improve ability to undergo a gynecological exam, Improve ability to perform ADLs, and improve ease with voiding bladder . 10 min Therapeutic Activity:  [x]  See flow sheet :    []  Increase Tissue extensibility        [x]  Assess fiber intake    [x]  Assess voiding habits  [x]  Assess bowel habits  [x]  Other: voiding interval   Rationale: Decrease resting tone of the pelvic floor, Increase tissue extensibility of the pelvic floor muscles, Inhibit abnormal muscle activity, and Improve lumbosacral and coccygeal mobility in order to Increase urinary continence, Decrease nocturia, Improve ability to undergo a gynecological exam, Improve ability to perform ADLs, and improve ease with voiding bladder.       10 min Neuromuscular Re-education:  [x]  See flow sheet :   []  Pelvic floor strengthening                 [x]  Pelvic floor downtraining  []  Quality pelvic floor contractions       [x]  Relaxation techniques  []  Urge suppression exercises  []  Other:  Rationale: Decrease resting tone of the pelvic floor, Increase tissue extensibility of the pelvic floor muscles, Inhibit abnormal muscle activity, and Improve lumbosacral and coccygeal mobility in order to Increase urinary continence, Decrease nocturia, Improve ability to undergo a gynecological exam, Improve ability to perform ADLs, and improve ease with voiding bladder. With   [] TE   [] TA   [] neuro  [] manual   [] other: Patient Education: [x] Review HEP    [] Progressed/Changed HEP based on:   [] positioning   [] body mechanics   [] transfers   [] heat/ice application    [] other:      Other Objective/Functional Measures:   []baseline resting tone:   []slow twitch mms   []fast twitch mms    Pain Level (0-10 scale) post treatment:  0/10    ASSESSMENT/Changes in Function: Progress with PFM down training, pt demonstrates good relaxation & form. Will cont as tolerated.     []  Decrease # of leaks   [] No change []  Improving [] Resolved     []  Decrease hypertonus [] No change []  Improving [] Resolved     []  Increase void interval [] No change []  Improving [] Resolved     []  Increase PF strength [] No change []  Improving [] Resolved     []  Increase PF endurance [] No change []  Improving [] Resolved     []  Increase endurance [] No change []  Improving [] Resolved     []  Decrease # of pads [] No change []  Improving [] Resolved     []  Decrease pain [] No change []  Improving [] Resolved     []  Increased coordination [] No change []  Improving [] Resolved     []  Increased Bowel Frequency [] No change []  Improving [] Resolved       Patient will continue to benefit from skilled PT / OT services to modify and progress therapeutic interventions, analyze and address functional mobility deficits, analyze and address ROM deficits, analyze and address strength deficits, analyze and address soft tissue restrictions, analyze and cue for proper movement patterns, analyze and modify for postural abnormalities, analyze and address imbalance/dizziness, and instruct in home and community integration to address functional deficits and attain remaining goals. []  See Plan of Care  [x]  See progress note/recertification  []  See Discharge Summary         Progress towards goals / Updated goals:  Goals for this certification period to be accomplished in 4-8 weeks   1. Patient will demonstrate independence in HEP for maintenance of pelvic floor program and improved quality of life. Status at Progress Note: good compliance 2-     2. Patient will have decreased leakage episodes to </=1 per week for increased quality of life. Status at Progress Note: 1-2x/week with coughing/sneezing; progressing gradually 2-     3. Patient will improve voiding interval to 1.5-2 hours improve her QOL. Status at Progress Note: progressing gradually, 45-50 min 2-  Current: progressing slowly per pt report 2-27-23     4. Patient will report at least 25% improvement with voiding/emptying bladder to improve her QOL. Status at Progress Note: progressing gradually, 20% 2-     5. Patient will have increased pelvic floor muscle motor performance by 1/2 to 1 grade for improved urinary continence and quality of life. Status at Progress Note: 2/5; progressing gradually 2-     6. Patient will have FOTO Urinary Problem score change of 4 points or more indicating improvement in function for icreased quality of life.   Status at Progress Note: initial assessment: 58; regressed 2 points 2-      PLAN  [x]  Upgrade activities as tolerated     [x]  Continue plan of care  []  Update interventions per flow sheet       []  Discharge due to:_  []  Other:_      Molly Melgar, PT 2/27/2023  7:53 AM    Future Appointments   Date Time Provider Glenn Gonzalez   2/27/2023 12:00 PM Dean De León Delanoeda Sensor [id number] [facility]   3/6/2023  9:00 AM Dean Brown, PT [id number] [facility]   3/13/2023  9:00 AM Dean Brown, PT [id number] [facility]   3/20/2023  9:00 AM Dean Brown, PT [id number] [facility]

## 2023-03-06 ENCOUNTER — APPOINTMENT (OUTPATIENT)
Facility: HOSPITAL | Age: 73
End: 2023-03-06
Payer: MEDICARE

## 2023-03-13 ENCOUNTER — HOSPITAL ENCOUNTER (OUTPATIENT)
Facility: HOSPITAL | Age: 73
Setting detail: RECURRING SERIES
Discharge: HOME | End: 2023-03-16
Payer: MEDICARE

## 2023-03-13 PROCEDURE — 97110 THERAPEUTIC EXERCISES: CPT

## 2023-03-13 PROCEDURE — 97140 MANUAL THERAPY 1/> REGIONS: CPT

## 2023-03-13 PROCEDURE — 97530 THERAPEUTIC ACTIVITIES: CPT

## 2023-03-13 PROCEDURE — 97112 NEUROMUSCULAR REEDUCATION: CPT

## 2023-03-13 NOTE — PROGRESS NOTES
PF DAILY TREATMENT NOTE (2023)      Patient Name: Kayla Sales    Date: 3/13/2023    : 1950  Insurance: Payor: MEDICARE / Plan: MEDICARE PART A AND B / Product Type: *No Product type* /      Patient  verified yes     Visit #   Current / Total 3 4-8   Time   In / Out 9:05 10:02   Pain   In / Out 0/10 0/10   Subjective Functional Status/Changes: Pt reports being able to maintain 2 hours voiding interval for a while but then the time fluctuates again. She had to take some allergy medications which usually trigger her urgency. No problem with Pelvic wand. Changes to:  Meds, Allergies, Med Hx, Sx Hx? If yes, update Summary List no     Timed treatment: 57 min  1:1 Timed treatment: 57 min      TREATMENT AREA =  Pelvic floor dysfunction [M62.89]      OBJECTIVE    24 min Therapeutic Exercise:  [] See flow sheet :   [x]  Pelvic floor strengthening                 []  Pelvic floor downtraining  []  Quality pelvic floor contractions       [x]  Relaxation techniques  []  Urge suppression exercises  []  Other:  Rationale: Increase pelvic floor muscle strength, Improve quality of pelvic floor contractions, Decrease resting tone of the pelvic floor, Increase tissue extensibility of the pelvic floor muscles, Increase core strength, Inhibit abnormal muscle activity, and Improve lumbosacral and coccygeal mobility in order to Increase urinary continence, Decrease urinary urgency, Increase ability to delay urination, Decrease frequency of urination, Decrease nocturia, Improve ability to perform ADLs, and void bladder with increased ease .        10 min Therapeutic Activity:  []  See flow sheet :    []  Increase Tissue extensibility        [x]  Assess fiber intake    [x]  Assess voiding habits  [x]  Assess bowel habits  []  Other:   Rationale:  Increase pelvic floor muscle strength, Improve quality of pelvic floor contractions, Decrease resting tone of the pelvic floor, Increase tissue extensibility of the pelvic floor muscles, Increase core strength, Inhibit abnormal muscle activity, and Improve lumbosacral and coccygeal mobility in order to Increase urinary continence, Decrease urinary urgency, Increase ability to delay urination, Decrease frequency of urination, Decrease nocturia, Improve ability to perform ADLs, and void bladder with increased ease. 8 min Neuromuscular Re-education:  []  See flow sheet :   []  Pelvic floor strengthening                 []  Pelvic floor downtraining  []  Quality pelvic floor contractions       [x]  Relaxation techniques  []  Urge suppression exercises  []  Other:  Rationale:  Increase pelvic floor muscle strength, Improve quality of pelvic floor contractions, Decrease resting tone of the pelvic floor, Increase tissue extensibility of the pelvic floor muscles, Increase core strength, Inhibit abnormal muscle activity, and Improve lumbosacral and coccygeal mobility in order to Increase urinary continence, Decrease urinary urgency, Increase ability to delay urination, Decrease frequency of urination, Decrease nocturia, Improve ability to perform ADLs, and void bladder with increased ease. 14 min Manual Therapy:  abdominal MFR, bladder mobilization    The manual therapy interventions were performed at a separate and distinct time from the therapeutic activities interventions. Rationale: Decrease resting tone of the pelvic floor, Increase tissue extensibility of the pelvic floor muscles, Inhibit abnormal muscle activity, and Improve lumbosacral and coccygeal mobility in order to   .     With   [] TE   [] TA   [] neuro  [] manual   [] other: Patient Education: [x] Review HEP    [] Progressed/Changed HEP based on:   [] positioning   [] body mechanics   [] transfers   [] heat/ice application    [] other:      Other Objective/Functional Measures:   []baseline resting tone:   []slow twitch mms   []fast twitch mms    Pain Level (0-10 scale) post treatment: 0/10    ASSESSMENT/Changes in Function: Pt reports improving voiding interval but difficulty with maintenance partially due to allergy medication. She demonstrates good form with all relaxation, improved tolerance with PFM exercises. Will progress with MFR for osito-urethral muscles next visit. []  Decrease # of leaks   [] No change []  Improving [] Resolved     []  Decrease hypertonus [] No change []  Improving [] Resolved     []  Increase void interval [] No change []  Improving [] Resolved     []  Increase PF strength [] No change []  Improving [] Resolved     []  Increase PF endurance [] No change []  Improving [] Resolved     []  Increase endurance [] No change []  Improving [] Resolved     []  Decrease # of pads [] No change []  Improving [] Resolved     []  Decrease pain [] No change []  Improving [] Resolved     []  Increased coordination [] No change []  Improving [] Resolved     []  Increased Bowel Frequency [] No change []  Improving [] Resolved       Patient will continue to benefit from skilled PT / OT services to modify and progress therapeutic interventions, analyze and address functional mobility deficits, analyze and address ROM deficits, analyze and address strength deficits, analyze and address soft tissue restrictions, analyze and cue for proper movement patterns, analyze and modify for postural abnormalities, analyze and address imbalance/dizziness, and instruct in home and community integration to address functional deficits and attain remaining goals. []  See Plan of Care  [x]  See progress note/recertification  []  See Discharge Summary           Progress towards goals / Updated goals:  Goals for this certification period to be accomplished in 4-8 weeks   1. Patient will demonstrate independence in HEP for maintenance of pelvic floor program and improved quality of life. Status at Progress Note: good compliance 2-     2.  Patient will have decreased leakage episodes to </=1 per week for increased quality of life.  Status at Progress Note: 1-2x/week with coughing/sneezing; progressing gradually 2-     3. Patient will improve voiding interval to 1.5-2 hours improve her QOL. Status at Progress Note: progressing gradually, 45-50 min 2-  Current: progressing slowly per pt report 2-27-23     4. Patient will report at least 25% improvement with voiding/emptying bladder to improve her QOL. Status at Progress Note: progressing gradually, 20% 2-  Current: cont to be limited 3-13-23     5. Patient will have increased pelvic floor muscle motor performance by 1/2 to 1 grade for improved urinary continence and quality of life. Status at Progress Note: 2/5; progressing gradually 2-     6. Patient will have FOTO Urinary Problem score change of 4 points or more indicating improvement in function for icreased quality of life.   Status at Progress Note: initial assessment: 58; regressed 2 points 2-      PLAN  [x]  Upgrade activities as tolerated     [x]  Continue plan of care  []  Update interventions per flow sheet       []  Discharge due to:_  []  Other:_      Ebony De La Garza, PT 3/13/2023  7:51 AM    Future Appointments   Date Time Provider Glenn Gonzalez   3/13/2023  9:00 AM Dean Zapata, PT [id number] [facility]   3/20/2023  9:00 AM Dean Zapata, PT [id number] [facility]   3/27/2023  8:00 AM Dean Zapata PT [id number] [facility]

## 2023-03-20 ENCOUNTER — HOSPITAL ENCOUNTER (OUTPATIENT)
Facility: HOSPITAL | Age: 73
Setting detail: RECURRING SERIES
Discharge: HOME | End: 2023-03-23
Payer: MEDICARE

## 2023-03-20 PROCEDURE — 97140 MANUAL THERAPY 1/> REGIONS: CPT

## 2023-03-20 PROCEDURE — 97110 THERAPEUTIC EXERCISES: CPT

## 2023-03-20 PROCEDURE — 97530 THERAPEUTIC ACTIVITIES: CPT

## 2023-03-20 NOTE — PROGRESS NOTES
PF DAILY TREATMENT NOTE (2023)      Patient Name: Sam Bajwa    Date: 3/20/2023    : 1950  Insurance: Payor: MEDICARE / Plan: MEDICARE PART A AND B / Product Type: *No Product type* /      Patient  verified yes     Visit #   Current / Total 4 4-8   Time   In / Out 9:07 10:08   Pain   In / Out 0/10 1/10 soreness   Subjective Functional Status/Changes: Pt reports cont to experience mod pain with pelvic wand. She feels about 30% improvement. Her voiding interval is about 1.5 hour at this time but she cont to experience incomplete voiding. Changes to:  Meds, Allergies, Med Hx, Sx Hx? If yes, update Summary List no     Time Treatment: 61 min  1:1 Time Treatment: 61 min      TREATMENT AREA =  Pelvic floor dysfunction [M62.89]      OBJECTIVE       15 min Therapeutic Exercise:  [] See flow sheet :   [x]  Pelvic floor strengthening                 []  Pelvic floor downtraining  []  Quality pelvic floor contractions       [x]  Relaxation techniques  []  Urge suppression exercises  []  Other:  Rationale: Increase pelvic floor muscle strength, Improve quality of pelvic floor contractions, Decrease resting tone of the pelvic floor, Increase tissue extensibility of the pelvic floor muscles, Increase core strength, Inhibit abnormal muscle activity, and Improve lumbosacral and coccygeal mobility in order to Increase urinary continence, Decrease urinary urgency, Increase ability to delay urination, Decrease frequency of urination, Decrease nocturia, Improve ability to perform ADLs, and void bladder with increased ease .                    31 min Therapeutic Activity:  []  See flow sheet :    []  Increase Tissue extensibility        [x]  Assess fiber intake    [x]  Assess voiding habits                  [x]  Assess bowel habits  [x]  Other: FOTO & goals reassessment      Rationale:  Increase pelvic floor muscle strength, Improve quality of pelvic floor contractions, Decrease resting tone of the pelvic floor,

## 2023-03-20 NOTE — THERAPY RECERTIFICATION
performance by 1/2 to 1 grade for improved urinary continence and quality of life. Status at Progress Note: 2/5; challenged with coordination 3-20-23     6. Patient will have FOTO Urinary Problem score change of 4 points or more indicating improvement in function for icreased quality of life. Status at Progress Note: initial assessment: 58; no change compared to initial assessment 3-20-23    Frequency / Duration: Patient to be seen 1-2 times per week for 8 weeks:    Assessment / Recommendations:Pt's making gradual progression with 30% improvement overall. She demonstrates good understanding with optimal voiding frequency & amount for bladder. Pt's compliant with HEP & pelvic wand use. She reports moderate improvement of urinary leakage during coughing/sneezing. Pt still present with elevated tone of PFM, mod pain with palpation & pelvic wand MFR. PFM's coordination & strength cont to be limited. She would cont to benefit from skilled Pelvic Floor PT to Increase pelvic floor muscle strength, Improve quality of pelvic floor contractions, Decrease resting tone of the pelvic floor, Increase tissue extensibility of the pelvic floor muscles, Increase core strength, Inhibit abnormal muscle activity, and Improve lumbosacral and coccygeal mobility in order to Increase urinary continence, Decrease urinary urgency, Increase ability to delay urination, Decrease frequency of urination, Decrease nocturia, Improve ability to perform ADLs, and void bladder with increased ease. Pt deemed highly benefit from routine bladder US scan to monitor residual voiding volume of her bladder to assist with establishing optimal voiding interval.      Certification Period: 3- to 5-      Krystal Medina PT 3/20/2023 7:48 AM    ________________________________________________________________________  I certify that the above Therapy Services are being furnished while the patient is under my care.  I agree with the treatment plan

## 2023-03-27 ENCOUNTER — HOSPITAL ENCOUNTER (OUTPATIENT)
Facility: HOSPITAL | Age: 73
Setting detail: RECURRING SERIES
Discharge: HOME | End: 2023-03-30
Payer: MEDICARE

## 2023-03-27 PROCEDURE — 97110 THERAPEUTIC EXERCISES: CPT

## 2023-03-27 PROCEDURE — 97530 THERAPEUTIC ACTIVITIES: CPT

## 2023-03-27 NOTE — PROGRESS NOTES
lumbosacral and coccygeal mobility in order to Increase urinary continence, Decrease urinary urgency, Increase ability to delay urination, Decrease frequency of urination, Decrease nocturia, Improve ability to perform ADLs, and void bladder with increased ease. With   [] TE   [] TA   [] neuro  [] manual   [] other: Patient Education: [x] Review HEP    [] Progressed/Changed HEP based on:   [] positioning   [] body mechanics   [] transfers   [] heat/ice application    [] other:      Other Objective/Functional Measures:   []baseline resting tone:   []slow twitch mms   []fast twitch mms    Pain Level (0-10 scale) post treatment: 0/10    ASSESSMENT/Changes in Function: Pt's progressing slowly with Pelvic Floor PT. Didn't report any problem with pelvic wand during the last several days. She was challenged with core stability during hips strengthening therex. She demonstrates good understanding with HEP/self management & confident to cont progressing at home, decreased PT frequency to 1x/1-2 weeks, pt's pleased with her updated POC.      []  Decrease # of leaks   [] No change []  Improving [] Resolved     []  Decrease hypertonus [] No change []  Improving [] Resolved     []  Increase void interval [] No change []  Improving [] Resolved     []  Increase PF strength [] No change []  Improving [] Resolved     []  Increase PF endurance [] No change []  Improving [] Resolved     []  Increase endurance [] No change []  Improving [] Resolved     []  Decrease # of pads [] No change []  Improving [] Resolved     []  Decrease pain [] No change []  Improving [] Resolved     []  Increased coordination [] No change []  Improving [] Resolved     []  Increased Bowel Frequency [] No change []  Improving [] Resolved       Patient will continue to benefit from skilled PT / OT services to modify and progress therapeutic interventions, analyze and address functional mobility deficits, analyze and address ROM deficits,

## 2023-04-14 ENCOUNTER — HOSPITAL ENCOUNTER (OUTPATIENT)
Facility: HOSPITAL | Age: 73
Setting detail: RECURRING SERIES
Discharge: HOME | End: 2023-04-17
Payer: MEDICARE

## 2023-04-14 PROCEDURE — 97110 THERAPEUTIC EXERCISES: CPT

## 2023-04-14 PROCEDURE — 97140 MANUAL THERAPY 1/> REGIONS: CPT

## 2023-04-14 PROCEDURE — 97530 THERAPEUTIC ACTIVITIES: CPT

## 2023-04-19 ENCOUNTER — HOSPITAL ENCOUNTER (OUTPATIENT)
Facility: HOSPITAL | Age: 73
Setting detail: RECURRING SERIES
Discharge: HOME | End: 2023-04-22
Payer: MEDICARE

## 2023-04-19 PROCEDURE — 97530 THERAPEUTIC ACTIVITIES: CPT

## 2023-04-19 PROCEDURE — 97110 THERAPEUTIC EXERCISES: CPT

## 2023-04-19 PROCEDURE — 97112 NEUROMUSCULAR REEDUCATION: CPT

## 2023-04-19 NOTE — PROGRESS NOTES
PF DAILY TREATMENT NOTE (2023)      Patient Name: Annabella Diehl    Date: 2023    : 1950  Insurance: Payor: MEDICARE / Plan: MEDICARE PART A AND B / Product Type: *No Product type* /      Patient  verified yes     Visit #   Current / Total 3 4-8   Time   In / Out 11:23  12:02   Pain   In / Out 4/10 4/10   Subjective Functional Status/Changes: INTERNAL Pt woke up with some soreness along her upper back   Changes to:  Meds, Allergies, Med Hx, Sx Hx? If yes, update Summary List no       TREATMENT AREA =  Pelvic floor dysfunction [M62.89]      OBJECTIVE    Therapeutic Procedures: Tx Min Billable or 1:1 Min (if diff from Tx Min) Procedure, Rationale, Specifics       Patient Education:  [] positioning   [] body mechanics   [] transfers   [] heat/ice application      14   [id number] Therapeutic Exercise (timed):  increase ROM, strength, coordination, balance, and proprioception to improve patient's ability to progress to PLOF and address remaining functional goals. (see flow sheet as applicable)      Details if applicable:    Rationale:  Increase pelvic floor muscle strength, Improve quality of pelvic floor contractions, Decrease resting tone of the pelvic floor, Increase tissue extensibility of the pelvic floor muscles, Increase core strength, Inhibit abnormal muscle activity, and Improve lumbosacral and coccygeal mobility in order to Increase urinary continence, Decrease urinary urgency, Increase ability to delay urination, Decrease frequency of urination, Decrease nocturia, Improve ability to perform ADLs, and void bladder with increased ease . 10   [id number] Therapeutic Activity (timed):  use of dynamic activities replicating functional movements to increase ROM, strength, coordination, balance, and proprioception in order to improve patient's ability to progress to PLOF and address remaining functional goals.   (see flow sheet as applicable)      Details if applicable:    Rationale:  Increase pelvic floor

## 2023-05-01 ENCOUNTER — HOSPITAL ENCOUNTER (OUTPATIENT)
Facility: HOSPITAL | Age: 73
Setting detail: RECURRING SERIES
Discharge: HOME | End: 2023-05-04
Payer: MEDICARE

## 2023-05-01 PROCEDURE — 97110 THERAPEUTIC EXERCISES: CPT

## 2023-05-01 PROCEDURE — 97530 THERAPEUTIC ACTIVITIES: CPT

## 2023-05-01 NOTE — PROGRESS NOTES
PF DAILY TREATMENT NOTE (2023)      Patient Name: Brett Sotomayor    Date: 2023    : 1950  Insurance: Payor: MEDICARE / Plan: MEDICARE PART A AND B / Product Type: *No Product type* /      Patient  verified yes     Visit #   Current / Total 4 4-8   Time   In / Out 10:07 10:42   Pain   In / Out 2.5/10 2/10   Subjective Functional Status/Changes: Pt will be out of town from 6-11 to 6-. Pt reports mild lingering urgency post voiding. No leakage during the last 1 week. Bowel function is WNL. She's doing good with pelvic wand but hasn't been doing PFM exercises as much. Pt started water aerobic about 3-4 weeks. Her Left groin is a little sore right now. Changes to:  Meds, Allergies, Med Hx, Sx Hx? If yes, update Summary List no       TREATMENT AREA =  Pelvic floor dysfunction [M62.89]      OBJECTIVE  Therapeutic Procedures: Tx Min Billable or 1:1 Min (if diff from Tx Min) Procedure, Rationale, Specifics       Patient Education:  [] positioning   [] body mechanics   [] transfers   [] heat/ice application      20   [id number] Therapeutic Exercise (timed):  increase ROM, strength, coordination, balance, and proprioception to improve patient's ability to progress to PLOF and address remaining functional goals. (see flow sheet as applicable)      Details if applicable:    Rationale:  Increase pelvic floor muscle strength, Improve quality of pelvic floor contractions, Decrease resting tone of the pelvic floor, Increase tissue extensibility of the pelvic floor muscles, Increase core strength, Inhibit abnormal muscle activity, and Improve lumbosacral and coccygeal mobility in order to Increase urinary continence, Decrease urinary urgency, Increase ability to delay urination, Decrease frequency of urination, Decrease nocturia, Improve ability to perform ADLs, and void bladder with increased ease .    15   [id number] Therapeutic Activity (timed):  use of dynamic activities replicating functional movements to increase

## 2023-05-09 ENCOUNTER — HOSPITAL ENCOUNTER (OUTPATIENT)
Facility: HOSPITAL | Age: 73
Setting detail: RECURRING SERIES
Discharge: HOME | End: 2023-05-12
Payer: MEDICARE

## 2023-05-09 PROCEDURE — 97140 MANUAL THERAPY 1/> REGIONS: CPT

## 2023-05-09 PROCEDURE — 97530 THERAPEUTIC ACTIVITIES: CPT

## 2023-05-09 NOTE — PROGRESS NOTES
[facility] at [address] [place] [address]  Ph [phone]  Fx [phone]    Continued Plan of Care/ Re-certification for Physical Therapy Services    Patient name: Linda Fatima Start of Care: 2023   Referral source: Roula Nagel MD : 1950               Medical Diagnosis: Pelvic floor dysfunction in female [M62.89]  Payor: Carol Hays / Plan: [place] A & B / Product Type: Medicare /  Onset Date: 6 months ago               Treatment Diagnosis: PFD, difficulty with emptying bladder, mixed UI   Prior Hospitalization: see medical history Provider#: [id number]   Medications: Verified on Patient summary List    Comorbidities: osteoporosis, arthritis   Prior Level of Function: ind with all mobility, only min leakage, house work, aerobic exercises. Visits from Start of Care: 13    Missed Visits: 0    Reporting Period: 2023 to 2023    The Plan of Care and following information is based on the patient's current status:  1. Patient will demonstrate independence in HEP for maintenance of pelvic floor program and improved quality of life. Status at Progress Note: good compliance 23  Current: good compliance with Pelvic wand, fair with HEP 23; good compliance 23     2. Patient will have decreased leakage episodes to </=1 per week for increased quality of life. Status at Progress Note: 1-2x during the last 2 weeks 23  Current: no leakage during the last 1 week 23; rarely leakage 23     3. Patient will report at least 50% improvement with voiding/emptying bladder to improve her QOL.   Status at Progress Note: about 30%   Current: mild incomplete voiding 23; about 40% overall 23    Key functional changes: improving ease with voiding bladder, improving tone of PFM     Problems/ barriers to goal attainment: elevated tone of PFM, worst with Left side, incomplete voiding of bladder, Urinary leakage

## 2023-05-09 NOTE — PROGRESS NOTES
PF DAILY TREATMENT NOTE (2023)      Patient Name: Zack Sampson    Date: 2023    : 1950  Insurance: Payor: MEDICARE / Plan: MEDICARE PART A AND B / Product Type: *No Product type* /      Patient  verified yes     Visit #   Current / Total 5 4-8   Time   In / Out 10:04 10:37   Pain   In / Out 0/10 0/10   Subjective Functional Status/Changes: Pt's been doing pelvic wand & HEP about 3 days/week. She notes less pain with pelvic wand. The incomplete voiding goes away after voiding instead of persisting as in the past. She's about to see her referring MD for her annual checkup. Changes to:  Meds, Allergies, Med Hx, Sx Hx? If yes, update Summary List no       TREATMENT AREA =  Pelvic floor dysfunction [M62.89]      OBJECTIVE    Therapeutic Procedures:  Billable or 1:1 Min (if diff from Tx Min) Procedure, Rationale, Specifics        Patient Education:  [] positioning   [] body mechanics   [] transfers   [] heat/ice application      05 [id number] Therapeutic Activity (timed):  use of dynamic activities replicating functional movements to increase ROM, strength, coordination, balance, and proprioception in order to improve patient's ability to progress to PLOF and address remaining functional goals. (see flow sheet as applicable)      Details if applicable:    Rationale:  Increase pelvic floor muscle strength, Improve quality of pelvic floor contractions, Decrease resting tone of the pelvic floor, Increase tissue extensibility of the pelvic floor muscles, Increase core strength, Inhibit abnormal muscle activity, and Improve lumbosacral and coccygeal mobility in order to Increase urinary continence, Decrease urinary urgency, Increase ability to delay urination, Decrease frequency of urination, Decrease nocturia, Improve ability to perform ADLs, and void bladder with increased ease .    15  00320 Manual Therapy (timed):  decrease pain, increase ROM, increase tissue extensibility, and increase postural

## 2023-05-26 ENCOUNTER — APPOINTMENT (OUTPATIENT)
Facility: HOSPITAL | Age: 73
End: 2023-05-26
Payer: MEDICARE

## 2023-05-30 ENCOUNTER — APPOINTMENT (OUTPATIENT)
Facility: HOSPITAL | Age: 73
End: 2023-05-30
Payer: MEDICARE

## 2023-06-21 ENCOUNTER — HOSPITAL ENCOUNTER (OUTPATIENT)
Facility: HOSPITAL | Age: 73
Setting detail: RECURRING SERIES
Discharge: HOME | End: 2023-06-24
Payer: MEDICARE

## 2023-06-21 PROCEDURE — 97530 THERAPEUTIC ACTIVITIES: CPT

## 2023-06-21 PROCEDURE — 97110 THERAPEUTIC EXERCISES: CPT

## 2023-06-21 NOTE — PROGRESS NOTES
PF DAILY TREATMENT NOTE (2023)      Patient Name: Ruma Barrios    Date: 2023    : 1950  Insurance: Payor: MEDICARE / Plan: MEDICARE PART A AND B / Product Type: *No Product type* /      Patient  verified yes     Visit #   Current / Total 1 4-8   Time   In / Out 1:25 2:03   Pain   In / Out 0/10 0/10   Subjective Functional Status/Changes: Pt reports WNL with voiding interval, only occasional lingering urgency/incomplete voiding. She will discuss with MD considering US scan for accurate residual volume. TREATMENT AREA =  Pelvic floor dysfunction [M62.89]      OBJECTIVE      Therapeutic Procedures:  Billable or 1:1 Min (if diff from Tx Min) Procedure, Rationale, Specifics         Patient Education:  [] positioning   [] body mechanics   [] transfers   [] heat/ice application      23  63760 Therapeutic Exercise (timed):  increase ROM, strength, coordination, balance, and proprioception to improve patient's ability to progress to PLOF and address remaining functional goals. (see flow sheet as applicable)     Details if applicable:    Rationale:  Increase pelvic floor muscle strength, Improve quality of pelvic floor contractions, Decrease resting tone of the pelvic floor, Increase tissue extensibility of the pelvic floor muscles, Increase core strength, Inhibit abnormal muscle activity, and Improve lumbosacral and coccygeal mobility in order to Increase urinary continence, Decrease urinary urgency, Increase ability to delay urination, Decrease frequency of urination, Decrease nocturia, Improve ability to perform ADLs, and void bladder with increased ease . 15   43334 Therapeutic Activity (timed):  use of dynamic activities replicating functional movements to increase ROM, strength, coordination, balance, and proprioception in order to improve patient's ability to progress to PLOF and address remaining functional goals.   (see flow sheet as applicable)      Details if applicable:    Rationale:

## 2023-06-21 NOTE — THERAPY RECERTIFICATION
urinary continence, Decrease urinary urgency, Increase ability to delay urination, Decrease frequency of urination, Decrease nocturia, Improve ability to perform ADLs, and void bladder with increased ease . Certification Period: 6- to 7-      Aura Martin, PT 6/21/2023 9:21 AM    ________________________________________________________________________  I certify that the above Therapy Services are being furnished while the patient is under my care. I agree with the treatment plan and certify that this therapy is necessary. [] I have read the above and request that my patient continue as recommended.   [] I have read the above report and request that my patient continue therapy with the following changes/special instructions: _____________________________________________  [] I have read the above report and request that my patient be discharged from therapy      Physician's Signature:_________________ Date:___________Time:__________                                      Noemi Miranda MD      Please sign and return to [facility] at [address] [place] [address]  Ph [phone]  Fx [phone]

## 2023-06-26 ENCOUNTER — APPOINTMENT (OUTPATIENT)
Facility: HOSPITAL | Age: 73
End: 2023-06-26
Payer: MEDICARE

## 2023-07-12 ENCOUNTER — HOSPITAL ENCOUNTER (OUTPATIENT)
Facility: HOSPITAL | Age: 73
Setting detail: RECURRING SERIES
Discharge: HOME | End: 2023-07-15
Payer: MEDICARE

## 2023-07-12 PROCEDURE — 97530 THERAPEUTIC ACTIVITIES: CPT

## 2023-07-12 NOTE — PROGRESS NOTES
PF DAILY TREATMENT NOTE (2023)      Patient Name: Radhames Valdez    Date: 2023    : 1950  Insurance: Payor: MEDICARE / Plan: MEDICARE PART A AND B / Product Type: *No Product type* /      Patient  verified yes     Visit #   Current / Total 2 4-8   Time   In / Out 2:02 2:43   Pain   In / Out 0/10 0/10   Subjective Functional Status/Changes: Pt had bladder US scan on 23. She will see her MD tomorrow. TREATMENT AREA =  Pelvic floor dysfunction [M62.89]      OBJECTIVE    Therapeutic Procedures:  Billable or 1:1 Min (if diff from Tx Min) Procedure, Rationale, Specifics         Patient Education:  [] positioning   [] body mechanics   [] transfers   [] heat/ice application           41   29039 Therapeutic Activity (timed):  use of dynamic activities replicating functional movements to increase ROM, strength, coordination, balance, and proprioception in order to improve patient's ability to progress to PLOF and address remaining functional goals. (see flow sheet as applicable)      Details if applicable:  HEP progression, PFM stimulator vs. Biofeedback device. Rationale:  Increase pelvic floor muscle strength, Improve quality of pelvic floor contractions, Decrease resting tone of the pelvic floor, Increase tissue extensibility of the pelvic floor muscles, Increase core strength, Inhibit abnormal muscle activity, and Improve lumbosacral and coccygeal mobility in order to Increase urinary continence, Decrease urinary urgency, Increase ability to delay urination, Decrease frequency of urination, Decrease nocturia, Improve ability to perform ADLs, and void bladder with increased ease .    Total  41 Total  41 Reminder: [facility] bill using total billable min of TIMED therapeutic procedures (example: do not include dry needle or estim unattended, both untimed codes, in totals to left)      [x]  Patient Education billed concurrently with other procedures     Other Objective/Functional Measures:

## 2025-01-05 SDOH — HEALTH STABILITY: PHYSICAL HEALTH: ON AVERAGE, HOW MANY DAYS PER WEEK DO YOU ENGAGE IN MODERATE TO STRENUOUS EXERCISE (LIKE A BRISK WALK)?: 4 DAYS

## 2025-01-05 SDOH — HEALTH STABILITY: PHYSICAL HEALTH: ON AVERAGE, HOW MANY MINUTES DO YOU ENGAGE IN EXERCISE AT THIS LEVEL?: 40 MIN

## 2025-01-06 ENCOUNTER — HOSPITAL ENCOUNTER (OUTPATIENT)
Facility: HOSPITAL | Age: 75
Setting detail: SPECIMEN
Discharge: HOME | End: 2025-01-09
Payer: MEDICARE

## 2025-01-06 ENCOUNTER — OFFICE VISIT (OUTPATIENT)
Facility: CLINIC | Age: 75
End: 2025-01-06
Payer: MEDICARE

## 2025-01-06 VITALS
WEIGHT: 139.8 LBS | SYSTOLIC BLOOD PRESSURE: 126 MMHG | TEMPERATURE: 97.1 F | BODY MASS INDEX: 25.73 KG/M2 | HEIGHT: 62 IN | RESPIRATION RATE: 16 BRPM | DIASTOLIC BLOOD PRESSURE: 83 MMHG | HEART RATE: 71 BPM | OXYGEN SATURATION: 98 %

## 2025-01-06 DIAGNOSIS — K76.0 FATTY LIVER: ICD-10-CM

## 2025-01-06 DIAGNOSIS — R14.3 EXCESSIVE GAS: ICD-10-CM

## 2025-01-06 DIAGNOSIS — K21.00 GASTROESOPHAGEAL REFLUX DISEASE WITH ESOPHAGITIS, UNSPECIFIED WHETHER HEMORRHAGE: Primary | ICD-10-CM

## 2025-01-06 DIAGNOSIS — K21.00 GASTROESOPHAGEAL REFLUX DISEASE WITH ESOPHAGITIS, UNSPECIFIED WHETHER HEMORRHAGE: ICD-10-CM

## 2025-01-06 DIAGNOSIS — R79.9 ABNORMAL BLOOD CHEMISTRY: ICD-10-CM

## 2025-01-06 LAB
ALBUMIN SERPL-MCNC: 3.6 G/DL (ref 3.4–5)
ALBUMIN/GLOB SERPL: 1.1 (ref 0.8–1.7)
ALP SERPL-CCNC: 98 U/L (ref 45–117)
ALT SERPL-CCNC: 26 U/L (ref 13–56)
ANION GAP SERPL CALC-SCNC: 3 MMOL/L (ref 3–18)
APPEARANCE UR: CLEAR
AST SERPL-CCNC: 22 U/L (ref 10–38)
BASOPHILS # BLD: 0.1 K/UL (ref 0–0.1)
BASOPHILS NFR BLD: 2 % (ref 0–2)
BILIRUB SERPL-MCNC: 0.7 MG/DL (ref 0.2–1)
BILIRUB UR QL: NEGATIVE
BUN SERPL-MCNC: 16 MG/DL (ref 7–18)
BUN/CREAT SERPL: 18 (ref 12–20)
CALCIUM SERPL-MCNC: 9.3 MG/DL (ref 8.5–10.1)
CHLORIDE SERPL-SCNC: 109 MMOL/L (ref 100–111)
CHOLEST SERPL-MCNC: 150 MG/DL
CO2 SERPL-SCNC: 29 MMOL/L (ref 21–32)
COLOR UR: YELLOW
CREAT SERPL-MCNC: 0.89 MG/DL (ref 0.6–1.3)
DIFFERENTIAL METHOD BLD: ABNORMAL
EOSINOPHIL # BLD: 0.3 K/UL (ref 0–0.4)
EOSINOPHIL NFR BLD: 9 % (ref 0–5)
ERYTHROCYTE [DISTWIDTH] IN BLOOD BY AUTOMATED COUNT: 13.5 % (ref 11.6–14.5)
EST. AVERAGE GLUCOSE BLD GHB EST-MCNC: 117 MG/DL
GLOBULIN SER CALC-MCNC: 3.2 G/DL (ref 2–4)
GLUCOSE SERPL-MCNC: 92 MG/DL (ref 74–99)
GLUCOSE UR STRIP.AUTO-MCNC: NEGATIVE MG/DL
HBA1C MFR BLD: 5.7 % (ref 4.2–5.6)
HCT VFR BLD AUTO: 43.5 % (ref 35–45)
HDLC SERPL-MCNC: 66 MG/DL (ref 40–60)
HDLC SERPL: 2.3 (ref 0–5)
HGB BLD-MCNC: 12.8 G/DL (ref 12–16)
HGB UR QL STRIP: NEGATIVE
IMM GRANULOCYTES # BLD AUTO: 0 K/UL (ref 0–0.04)
IMM GRANULOCYTES NFR BLD AUTO: 0 % (ref 0–0.5)
KETONES UR QL STRIP.AUTO: NEGATIVE MG/DL
LDLC SERPL CALC-MCNC: 68.6 MG/DL (ref 0–100)
LEUKOCYTE ESTERASE UR QL STRIP.AUTO: NEGATIVE
LIPID PANEL: ABNORMAL
LYMPHOCYTES # BLD: 1.2 K/UL (ref 0.9–3.6)
LYMPHOCYTES NFR BLD: 36 % (ref 21–52)
MCH RBC QN AUTO: 25.3 PG (ref 24–34)
MCHC RBC AUTO-ENTMCNC: 29.4 G/DL (ref 31–37)
MCV RBC AUTO: 86.1 FL (ref 78–100)
MONOCYTES # BLD: 0.3 K/UL (ref 0.05–1.2)
MONOCYTES NFR BLD: 8 % (ref 3–10)
NEUTS SEG # BLD: 1.6 K/UL (ref 1.8–8)
NEUTS SEG NFR BLD: 46 % (ref 40–73)
NITRITE UR QL STRIP.AUTO: NEGATIVE
NRBC # BLD: 0 K/UL (ref 0–0.01)
NRBC BLD-RTO: 0 PER 100 WBC
PH UR STRIP: 7.5 (ref 5–8)
PLATELET # BLD AUTO: 299 K/UL (ref 135–420)
PMV BLD AUTO: 11.2 FL (ref 9.2–11.8)
POTASSIUM SERPL-SCNC: 4.1 MMOL/L (ref 3.5–5.5)
PROT SERPL-MCNC: 6.8 G/DL (ref 6.4–8.2)
PROT UR STRIP-MCNC: NEGATIVE MG/DL
RBC # BLD AUTO: 5.05 M/UL (ref 4.2–5.3)
SODIUM SERPL-SCNC: 141 MMOL/L (ref 136–145)
SP GR UR REFRACTOMETRY: 1.01 (ref 1–1.03)
TRIGL SERPL-MCNC: 77 MG/DL
UROBILINOGEN UR QL STRIP.AUTO: 0.2 EU/DL (ref 0.2–1)
VLDLC SERPL CALC-MCNC: 15.4 MG/DL
WBC # BLD AUTO: 3.4 K/UL (ref 4.6–13.2)

## 2025-01-06 PROCEDURE — 1036F TOBACCO NON-USER: CPT | Performed by: INTERNAL MEDICINE

## 2025-01-06 PROCEDURE — 85025 COMPLETE CBC W/AUTO DIFF WBC: CPT

## 2025-01-06 PROCEDURE — 83036 HEMOGLOBIN GLYCOSYLATED A1C: CPT

## 2025-01-06 PROCEDURE — 99204 OFFICE O/P NEW MOD 45 MIN: CPT | Performed by: INTERNAL MEDICINE

## 2025-01-06 PROCEDURE — 1090F PRES/ABSN URINE INCON ASSESS: CPT | Performed by: INTERNAL MEDICINE

## 2025-01-06 PROCEDURE — 3017F COLORECTAL CA SCREEN DOC REV: CPT | Performed by: INTERNAL MEDICINE

## 2025-01-06 PROCEDURE — 80061 LIPID PANEL: CPT

## 2025-01-06 PROCEDURE — 1159F MED LIST DOCD IN RCRD: CPT | Performed by: INTERNAL MEDICINE

## 2025-01-06 PROCEDURE — G8427 DOCREV CUR MEDS BY ELIG CLIN: HCPCS | Performed by: INTERNAL MEDICINE

## 2025-01-06 PROCEDURE — M1308 PR FLU IMMUNIZE NO ADMIN: HCPCS | Performed by: INTERNAL MEDICINE

## 2025-01-06 PROCEDURE — 80053 COMPREHEN METABOLIC PANEL: CPT

## 2025-01-06 PROCEDURE — 1123F ACP DISCUSS/DSCN MKR DOCD: CPT | Performed by: INTERNAL MEDICINE

## 2025-01-06 PROCEDURE — G8419 CALC BMI OUT NRM PARAM NOF/U: HCPCS | Performed by: INTERNAL MEDICINE

## 2025-01-06 PROCEDURE — 1126F AMNT PAIN NOTED NONE PRSNT: CPT | Performed by: INTERNAL MEDICINE

## 2025-01-06 PROCEDURE — G8400 PT W/DXA NO RESULTS DOC: HCPCS | Performed by: INTERNAL MEDICINE

## 2025-01-06 PROCEDURE — 81003 URINALYSIS AUTO W/O SCOPE: CPT

## 2025-01-06 PROCEDURE — 36415 COLL VENOUS BLD VENIPUNCTURE: CPT

## 2025-01-06 PROCEDURE — 1160F RVW MEDS BY RX/DR IN RCRD: CPT | Performed by: INTERNAL MEDICINE

## 2025-01-06 RX ORDER — BIOTIN 1 MG
1 TABLET ORAL DAILY
COMMUNITY

## 2025-01-06 SDOH — ECONOMIC STABILITY: FOOD INSECURITY: WITHIN THE PAST 12 MONTHS, YOU WORRIED THAT YOUR FOOD WOULD RUN OUT BEFORE YOU GOT MONEY TO BUY MORE.: NEVER TRUE

## 2025-01-06 SDOH — ECONOMIC STABILITY: FOOD INSECURITY: WITHIN THE PAST 12 MONTHS, THE FOOD YOU BOUGHT JUST DIDN'T LAST AND YOU DIDN'T HAVE MONEY TO GET MORE.: NEVER TRUE

## 2025-01-06 SDOH — ECONOMIC STABILITY: INCOME INSECURITY: HOW HARD IS IT FOR YOU TO PAY FOR THE VERY BASICS LIKE FOOD, HOUSING, MEDICAL CARE, AND HEATING?: NOT HARD AT ALL

## 2025-01-06 ASSESSMENT — PATIENT HEALTH QUESTIONNAIRE - PHQ9
SUM OF ALL RESPONSES TO PHQ QUESTIONS 1-9: 0
1. LITTLE INTEREST OR PLEASURE IN DOING THINGS: NOT AT ALL
2. FEELING DOWN, DEPRESSED OR HOPELESS: NOT AT ALL
SUM OF ALL RESPONSES TO PHQ9 QUESTIONS 1 & 2: 0

## 2025-01-06 ASSESSMENT — ENCOUNTER SYMPTOMS
ABDOMINAL DISTENTION: 0
ABDOMINAL PAIN: 0
SORE THROAT: 0
COUGH: 0
SHORTNESS OF BREATH: 0

## 2025-01-06 NOTE — PROGRESS NOTES
\"Have you been to the ER, urgent care clinic since your last visit?  Hospitalized since your last visit?\"    NO    “Have you seen or consulted any other health care providers outside our system since your last visit?”    YES - When: approximately 1 months ago.  Where and Why: [place], physical.      “Have you had a colorectal cancer screening such as a colonoscopy/FIT/Cologuard?    YES - Type: Colonoscopy - Where: [facility] digestive Care Nurse/CMA to request most recent records if not in the chart     No colonoscopy on file  No cologuard on file  No FIT/FOBT on file   No flexible sigmoidoscopy on file

## 2025-01-06 NOTE — ASSESSMENT & PLAN NOTE
Chronic, at goal (stable), clinically stable at this time.    Orders:    CBC with Auto Differential; Future    Comprehensive Metabolic Panel; Future    Lipid Panel; Future    Hemoglobin A1C; Future    Urinalysis; Future

## 2025-01-06 NOTE — PROGRESS NOTES
Damari Bella (:  1950) is a 74 y.o. female,New patient, here for evaluation of the following chief complaint(s):  Other (Patient reports low white blood cell count ) and Shoulder Pain (Patient reports pain in left shoulder when walking for about 2 weeks.)         Assessment & Plan  Gastroesophageal reflux disease with esophagitis, unspecified whether hemorrhage   Chronic, at goal (stable),  clinically stable at this time.    Orders:    CBC with Auto Differential; Future    Comprehensive Metabolic Panel; Future    Lipid Panel; Future    Hemoglobin A1C; Future    Urinalysis; Future    Abnormal blood chemistry    Borderline low white blood cell count.  Borderline elevated alkaline phosphatase.  Will continue to monitor and recheck labs.    Orders:    Lipid Panel; Future    Hemoglobin A1C; Future    Excessive gas   Chronic, at goal (stable), with some bloating and gaseousness.  Treating symptomatically.         Fatty liver    Under evaluation by the gastroenterologist.  Will monitor results of hepatic ultrasound.           No follow-ups on file.       Subjective   Today's visit is for an initial visit to establish a physician relationship.  Patient wishes to see if she can find a physician that she feels comfortable communicating with.  Her previous PCP, Dr. Munira Berrios, moved to a practice that no longer accepts her insurance.  Review of her record shows a history of hiatal hernia and GERD which is relatively stable at present.    She also recently had a local vascular evaluation to rule out thoracic aneurysm that was seen on the study done out in [place].  That evaluation was done by vascular surgery, Dr. Oconnor, who indicated that he did not see this problem on his evaluation.  She recently had blood results which showed a mildly reduced white count at 3.2.  Will repeat this study but doubt that it has clinical significance.  She also had slightly elevated alkaline phosphatase noted on that lab work.

## 2025-02-07 ENCOUNTER — OFFICE VISIT (OUTPATIENT)
Facility: CLINIC | Age: 75
End: 2025-02-07
Payer: MEDICARE

## 2025-02-07 VITALS
WEIGHT: 135.4 LBS | SYSTOLIC BLOOD PRESSURE: 108 MMHG | BODY MASS INDEX: 24.92 KG/M2 | RESPIRATION RATE: 16 BRPM | DIASTOLIC BLOOD PRESSURE: 68 MMHG | HEART RATE: 85 BPM | TEMPERATURE: 96.1 F | HEIGHT: 62 IN | OXYGEN SATURATION: 98 %

## 2025-02-07 DIAGNOSIS — R89.9 ABNORMAL LABORATORY TEST RESULT: ICD-10-CM

## 2025-02-07 DIAGNOSIS — M15.0 PRIMARY OSTEOARTHRITIS INVOLVING MULTIPLE JOINTS: Primary | ICD-10-CM

## 2025-02-07 DIAGNOSIS — K76.0 FATTY LIVER: ICD-10-CM

## 2025-02-07 DIAGNOSIS — K21.00 GASTROESOPHAGEAL REFLUX DISEASE WITH ESOPHAGITIS, UNSPECIFIED WHETHER HEMORRHAGE: ICD-10-CM

## 2025-02-07 PROCEDURE — 1123F ACP DISCUSS/DSCN MKR DOCD: CPT | Performed by: INTERNAL MEDICINE

## 2025-02-07 PROCEDURE — G8420 CALC BMI NORM PARAMETERS: HCPCS | Performed by: INTERNAL MEDICINE

## 2025-02-07 PROCEDURE — 1036F TOBACCO NON-USER: CPT | Performed by: INTERNAL MEDICINE

## 2025-02-07 PROCEDURE — 3017F COLORECTAL CA SCREEN DOC REV: CPT | Performed by: INTERNAL MEDICINE

## 2025-02-07 PROCEDURE — 1090F PRES/ABSN URINE INCON ASSESS: CPT | Performed by: INTERNAL MEDICINE

## 2025-02-07 PROCEDURE — G8400 PT W/DXA NO RESULTS DOC: HCPCS | Performed by: INTERNAL MEDICINE

## 2025-02-07 PROCEDURE — G8427 DOCREV CUR MEDS BY ELIG CLIN: HCPCS | Performed by: INTERNAL MEDICINE

## 2025-02-07 PROCEDURE — 1160F RVW MEDS BY RX/DR IN RCRD: CPT | Performed by: INTERNAL MEDICINE

## 2025-02-07 PROCEDURE — 1125F AMNT PAIN NOTED PAIN PRSNT: CPT | Performed by: INTERNAL MEDICINE

## 2025-02-07 PROCEDURE — 1159F MED LIST DOCD IN RCRD: CPT | Performed by: INTERNAL MEDICINE

## 2025-02-07 PROCEDURE — 99214 OFFICE O/P EST MOD 30 MIN: CPT | Performed by: INTERNAL MEDICINE

## 2025-02-07 RX ORDER — VITAMIN E 268 MG
400 CAPSULE ORAL DAILY
COMMUNITY

## 2025-02-07 RX ORDER — ASCORBIC ACID 500 MG
1000 TABLET ORAL DAILY
COMMUNITY

## 2025-02-07 RX ORDER — PSYLLIUM HUSK 0.4 G
1000 CAPSULE ORAL DAILY
COMMUNITY

## 2025-02-07 SDOH — ECONOMIC STABILITY: INCOME INSECURITY: IN THE LAST 12 MONTHS, WAS THERE A TIME WHEN YOU WERE NOT ABLE TO PAY THE MORTGAGE OR RENT ON TIME?: NO

## 2025-02-07 SDOH — ECONOMIC STABILITY: FOOD INSECURITY: WITHIN THE PAST 12 MONTHS, YOU WORRIED THAT YOUR FOOD WOULD RUN OUT BEFORE YOU GOT MONEY TO BUY MORE.: NEVER TRUE

## 2025-02-07 SDOH — ECONOMIC STABILITY: TRANSPORTATION INSECURITY
IN THE PAST 12 MONTHS, HAS THE LACK OF TRANSPORTATION KEPT YOU FROM MEDICAL APPOINTMENTS OR FROM GETTING MEDICATIONS?: NO

## 2025-02-07 SDOH — ECONOMIC STABILITY: TRANSPORTATION INSECURITY
IN THE PAST 12 MONTHS, HAS LACK OF TRANSPORTATION KEPT YOU FROM MEETINGS, WORK, OR FROM GETTING THINGS NEEDED FOR DAILY LIVING?: NO

## 2025-02-07 SDOH — ECONOMIC STABILITY: FOOD INSECURITY: WITHIN THE PAST 12 MONTHS, THE FOOD YOU BOUGHT JUST DIDN'T LAST AND YOU DIDN'T HAVE MONEY TO GET MORE.: NEVER TRUE

## 2025-02-07 ASSESSMENT — ENCOUNTER SYMPTOMS
SHORTNESS OF BREATH: 0
ABDOMINAL DISTENTION: 0
COUGH: 0
SORE THROAT: 0
ABDOMINAL PAIN: 0

## 2025-02-07 NOTE — PROGRESS NOTES
Comprehensive Visit:    Chief Complaint   Patient presents with    abnormal blood chemistry         Assessment/Plan:       ICD-10-CM    1. Primary osteoarthritis involving multiple joints  M15.0     Particularly involving the left hip and the left knee.  Will place on a trial of Voltaren gel.      2. Abnormal laboratory test result  R89.9     Patient concerned about abnormal laboratory test results.  Wants to review.  Have reassured her that her labs show no significant abnormalities.      3. Gastroesophageal reflux disease with esophagitis, unspecified whether hemorrhage  K21.00       4. Fatty liver  K76.0            1. Primary osteoarthritis involving multiple joints  Comments:  Particularly involving the left hip and the left knee.  Will place on a trial of Voltaren gel.  2. Abnormal laboratory test result  Comments:  Patient concerned about abnormal laboratory test results.  Wants to review.  Have reassured her that her labs show no significant abnormalities.  3. Gastroesophageal reflux disease with esophagitis, unspecified whether hemorrhage  4. Fatty liver       No follow-up provider specified.       Subjective:     Damari is a 74 y.o. y.o. female who complains of   Chief Complaint   Patient presents with    abnormal blood chemistry       HPI: Today's visit is for routine chronic disease management patient also is concerned about laboratory results and wants to review them.  I have reassured the patient that there are no significant abnormalities in either her CBC, her CMP, or her lipid profile.  Hemoglobin A1c is 5.7 which is technically \"prediabetes,\" but most likely represents her good diet and exercise regimen which is keeping this under control at this time.  She does have a strongly positive family history of diabetes.    Past Medical History:   Diagnosis Date    Anal fissure     GERD (gastroesophageal reflux disease)     H/O: hysterectomy 02/12/1998     Past Surgical History:   Procedure Laterality

## 2025-02-07 NOTE — PROGRESS NOTES
\"Have you been to the ER, urgent care clinic since your last visit?  Hospitalized since your last visit?\"    NO    “Have you seen or consulted any other health care providers outside our system since your last visit?”    YES - When: approximately 2  weeks ago.  Where and Why: [place], blood clot on right side of inner mouth.      “Have you had a colorectal cancer screening such as a colonoscopy/FIT/Cologuard?    YES - Type: Colonoscopy - Where: [facility] Nurse/CMA to request most recent records if not in the chart     No colonoscopy on file  No cologuard on file  No FIT/FOBT on file   No flexible sigmoidoscopy on file

## 2025-02-24 ENCOUNTER — COMMUNITY OUTREACH (OUTPATIENT)
Facility: CLINIC | Age: 75
End: 2025-02-24

## 2025-02-24 ENCOUNTER — OFFICE VISIT (OUTPATIENT)
Facility: CLINIC | Age: 75
End: 2025-02-24
Payer: MEDICARE

## 2025-02-24 VITALS
DIASTOLIC BLOOD PRESSURE: 83 MMHG | RESPIRATION RATE: 16 BRPM | BODY MASS INDEX: 24.99 KG/M2 | TEMPERATURE: 98.3 F | HEART RATE: 69 BPM | OXYGEN SATURATION: 97 % | SYSTOLIC BLOOD PRESSURE: 124 MMHG | HEIGHT: 62 IN | WEIGHT: 135.8 LBS

## 2025-02-24 DIAGNOSIS — S93.529A METATARSOPHALANGEAL (JOINT) SPRAIN, INITIAL ENCOUNTER: Primary | ICD-10-CM

## 2025-02-24 PROCEDURE — G8420 CALC BMI NORM PARAMETERS: HCPCS | Performed by: INTERNAL MEDICINE

## 2025-02-24 PROCEDURE — 1123F ACP DISCUSS/DSCN MKR DOCD: CPT | Performed by: INTERNAL MEDICINE

## 2025-02-24 PROCEDURE — 1090F PRES/ABSN URINE INCON ASSESS: CPT | Performed by: INTERNAL MEDICINE

## 2025-02-24 PROCEDURE — 1125F AMNT PAIN NOTED PAIN PRSNT: CPT | Performed by: INTERNAL MEDICINE

## 2025-02-24 PROCEDURE — 1036F TOBACCO NON-USER: CPT | Performed by: INTERNAL MEDICINE

## 2025-02-24 PROCEDURE — 1159F MED LIST DOCD IN RCRD: CPT | Performed by: INTERNAL MEDICINE

## 2025-02-24 PROCEDURE — 99213 OFFICE O/P EST LOW 20 MIN: CPT | Performed by: INTERNAL MEDICINE

## 2025-02-24 PROCEDURE — 1160F RVW MEDS BY RX/DR IN RCRD: CPT | Performed by: INTERNAL MEDICINE

## 2025-02-24 PROCEDURE — G8427 DOCREV CUR MEDS BY ELIG CLIN: HCPCS | Performed by: INTERNAL MEDICINE

## 2025-02-24 PROCEDURE — 3017F COLORECTAL CA SCREEN DOC REV: CPT | Performed by: INTERNAL MEDICINE

## 2025-02-24 PROCEDURE — G8400 PT W/DXA NO RESULTS DOC: HCPCS | Performed by: INTERNAL MEDICINE

## 2025-02-24 RX ORDER — DICLOFENAC SODIUM 75 MG/1
75 TABLET, DELAYED RELEASE ORAL 2 TIMES DAILY
Qty: 60 TABLET | Refills: 0 | Status: SHIPPED | OUTPATIENT
Start: 2025-02-24

## 2025-02-24 ASSESSMENT — ANXIETY QUESTIONNAIRES
IF YOU CHECKED OFF ANY PROBLEMS ON THIS QUESTIONNAIRE, HOW DIFFICULT HAVE THESE PROBLEMS MADE IT FOR YOU TO DO YOUR WORK, TAKE CARE OF THINGS AT HOME, OR GET ALONG WITH OTHER PEOPLE: NOT DIFFICULT AT ALL
2. NOT BEING ABLE TO STOP OR CONTROL WORRYING: NOT AT ALL
GAD7 TOTAL SCORE: 0
4. TROUBLE RELAXING: NOT AT ALL
1. FEELING NERVOUS, ANXIOUS, OR ON EDGE: NOT AT ALL
5. BEING SO RESTLESS THAT IT IS HARD TO SIT STILL: NOT AT ALL
7. FEELING AFRAID AS IF SOMETHING AWFUL MIGHT HAPPEN: NOT AT ALL
3. WORRYING TOO MUCH ABOUT DIFFERENT THINGS: NOT AT ALL
6. BECOMING EASILY ANNOYED OR IRRITABLE: NOT AT ALL

## 2025-02-24 ASSESSMENT — PATIENT HEALTH QUESTIONNAIRE - PHQ9
SUM OF ALL RESPONSES TO PHQ QUESTIONS 1-9: 0
SUM OF ALL RESPONSES TO PHQ9 QUESTIONS 1 & 2: 0
SUM OF ALL RESPONSES TO PHQ QUESTIONS 1-9: 0
1. LITTLE INTEREST OR PLEASURE IN DOING THINGS: NOT AT ALL
2. FEELING DOWN, DEPRESSED OR HOPELESS: NOT AT ALL

## 2025-02-24 ASSESSMENT — ENCOUNTER SYMPTOMS
SORE THROAT: 0
ABDOMINAL PAIN: 0
COUGH: 0
SHORTNESS OF BREATH: 0
ABDOMINAL DISTENTION: 0

## 2025-02-24 NOTE — PROGRESS NOTES
Comprehensive Visit:    Chief Complaint   Patient presents with    Foot Pain     Left Foot injury, sharp piercing pain 4th toe since 02/12/2025              Assessment & Plan  1. Pain in the left foot.  The pain is localized around the fourth and fifth toes and is described as severe and stabbing, especially when spreading the toes or putting pressure on the area. The pain began during a body pump class while doing lunges. There is no indication of ligament tears based on the current examination. She is advised to continue her exercise regimen but to be cautious with the affected foot. A prescription for an anti-inflammatory medication will be provided to be taken for approximately 10 days. She is instructed to avoid maneuvers that exacerbate the pain. If there is no improvement within a week, imaging studies of the foot may be considered.    2. Excessive gas.  She continues to experience excessive gas and is currently taking simethicone. She is also avoiding foods that trigger her symptoms, such as spicy foods, garlic, onions, wheat, dairy, live greens, cabbage, sugar, apples, and pears.    Follow-up  The patient will follow up in May.      ICD-10-CM    1. Metatarsophalangeal (joint) sprain, initial encounter  S93.529A     Right fourth and fifth toes           1. Metatarsophalangeal (joint) sprain, initial encounter  Comments:  Right fourth and fifth toes       No follow-up provider specified.       Subjective:     Damari is a 74 y.o. y.o. female who complains of   Chief Complaint   Patient presents with    Foot Pain     Left Foot injury, sharp piercing pain 4th toe since 02/12/2025       History of Present Illness  The patient is a 74-year-old female who presents for evaluation of left foot pain and excessive gas.    She experienced an acute onset of sharp, stabbing pain in her left foot during a lunge exercise at a body pump class last Wednesday morning. The pain is localized to the area around her fourth

## 2025-02-24 NOTE — PROGRESS NOTES
Comprehensive Visit:    Chief Complaint   Patient presents with   • Foot Pain     Left Foot injury, sharp piercing pain 4th toe since 02/12/2025              Assessment & Plan      No diagnosis found.     {There are no diagnoses linked to this encounter. (Refresh or delete this SmartLink)}     No follow-up provider specified.       Subjective:     Damari is a 74 y.o. y.o. female who complains of   Chief Complaint   Patient presents with   • Foot Pain     Left Foot injury, sharp piercing pain 4th toe since 02/12/2025       History of Present Illness        Past Medical History:   Diagnosis Date   • Anal fissure    • GERD (gastroesophageal reflux disease)    • H/O: hysterectomy 02/12/1998     Past Surgical History:   Procedure Laterality Date   • ANKLE FRACTURE SURGERY Left 2021    tenex surgery   • BREAST SURGERY      reduction   • EYE SURGERY     • GI      ANAL FISSURE   • GI      colonoscopy, EGD   • GYN      hysterectomy   • HYSTERECTOMY (CERVIX STATUS UNKNOWN)  02/12/1998   • UPPER GASTROINTESTINAL ENDOSCOPY       Social History     Socioeconomic History   • Marital status:      Spouse name: None   • Number of children: None   • Years of education: None   • Highest education level: None   Tobacco Use   • Smoking status: Never   • Smokeless tobacco: Never   Vaping Use   • Vaping status: Never Used   Substance and Sexual Activity   • Alcohol use: No     Alcohol/week: 0.0 standard drinks of alcohol   • Drug use: No     Social Determinants of Health     Financial Resource Strain: Low Risk  (1/6/2025)    Overall Financial Resource Strain (CARDIA)    • Difficulty of Paying Living Expenses: Not hard at all   Food Insecurity: No Food Insecurity (2/7/2025)    Hunger Vital Sign    • Worried About Running Out of Food in the Last Year: Never true    • Ran Out of Food in the Last Year: Never true   Transportation Needs: No Transportation Needs (2/7/2025)    PRAPARE - Transportation    • Lack of Transportation

## 2025-02-24 NOTE — PROGRESS NOTES
\"Have you been to the ER, urgent care clinic since your last visit?  Hospitalized since your last visit?\"    NO    “Have you seen or consulted any other health care providers outside our system since your last visit?”    Yes, Gastroenterology- [facility], [place]: ER's (prn), Breast clinics, Ophthalmology- [facility], Dental- [facility]    Click Here for Release of Records Request

## 2025-03-17 ENCOUNTER — OFFICE VISIT (OUTPATIENT)
Age: 75
End: 2025-03-17
Payer: MEDICARE

## 2025-03-17 VITALS — BODY MASS INDEX: 25.21 KG/M2 | WEIGHT: 137 LBS | HEIGHT: 62 IN

## 2025-03-17 DIAGNOSIS — M70.61 TROCHANTERIC BURSITIS OF RIGHT HIP: ICD-10-CM

## 2025-03-17 DIAGNOSIS — M70.62 TROCHANTERIC BURSITIS OF LEFT HIP: Primary | ICD-10-CM

## 2025-03-17 PROCEDURE — G8400 PT W/DXA NO RESULTS DOC: HCPCS | Performed by: ORTHOPAEDIC SURGERY

## 2025-03-17 PROCEDURE — 1036F TOBACCO NON-USER: CPT | Performed by: ORTHOPAEDIC SURGERY

## 2025-03-17 PROCEDURE — 73523 X-RAY EXAM HIPS BI 5/> VIEWS: CPT | Performed by: ORTHOPAEDIC SURGERY

## 2025-03-17 PROCEDURE — 1123F ACP DISCUSS/DSCN MKR DOCD: CPT | Performed by: ORTHOPAEDIC SURGERY

## 2025-03-17 PROCEDURE — G8428 CUR MEDS NOT DOCUMENT: HCPCS | Performed by: ORTHOPAEDIC SURGERY

## 2025-03-17 PROCEDURE — 3017F COLORECTAL CA SCREEN DOC REV: CPT | Performed by: ORTHOPAEDIC SURGERY

## 2025-03-17 PROCEDURE — 1090F PRES/ABSN URINE INCON ASSESS: CPT | Performed by: ORTHOPAEDIC SURGERY

## 2025-03-17 PROCEDURE — 1125F AMNT PAIN NOTED PAIN PRSNT: CPT | Performed by: ORTHOPAEDIC SURGERY

## 2025-03-17 PROCEDURE — 99214 OFFICE O/P EST MOD 30 MIN: CPT | Performed by: ORTHOPAEDIC SURGERY

## 2025-03-17 PROCEDURE — G8419 CALC BMI OUT NRM PARAM NOF/U: HCPCS | Performed by: ORTHOPAEDIC SURGERY

## 2025-03-17 RX ORDER — LIDOCAINE 50 MG/G
1 PATCH TOPICAL DAILY
Qty: 10 PATCH | Refills: 0 | Status: SHIPPED | OUTPATIENT
Start: 2025-03-17 | End: 2025-03-27

## 2025-03-17 NOTE — PROGRESS NOTES
the bilateral hips including AP pelvis and AP and crosstable lateral of each hip taken at the [facility] office show signs of arthritis in hips, about 25% narrowing in the right hip with bone spur off the femoral head, mild sclerosis. On the left side, subchondral sclerosis, subchondral cyst, about 25% joint space loss, very small osteophyte off of the acetabulum. Significant arthritis in L4-L5 area in back.    Assessment & Plan  1. Bilateral trochanteric bursitis.  The condition appears to be improving with the implementation of exercises. Radiographic evidence indicates the presence of arthritis in the hips, which could potentially contribute to an altered gait, subsequently leading to muscle and tendon tightening, and ultimately, bursitis. Given that this is the initial week of symptom exacerbation, it is prudent to postpone any injections. A referral for physical therapy will be initiated to enhance core strength for the back and hip strength. Prescriptions for Voltaren gel and lidocaine patches have been provided for topical application over the tender areas. If the pain persists over the next 4 to 6 weeks, she is advised to schedule another appointment for a potential injection.    PROCEDURE  The patient previously received an injection in her left hip, which was beneficial.      Attestation             No data to display                Past Medical History:   Diagnosis Date    Anal fissure     GERD (gastroesophageal reflux disease)     H/O: hysterectomy 02/12/1998       Family History   Problem Relation Age of Onset    Diabetes Father     Hypertension Mother     Stroke Mother     Hypertension Sister     Diabetes Sister        Current Outpatient Medications   Medication Sig Dispense Refill    diclofenac sodium (VOLTAREN) 1 % GEL Apply 4 g topically 4 times daily 350 g 5    lidocaine (LIDODERM) 5 % Place 1 patch onto the skin daily for 10 days 12 hours on, 12 hours off. 10 patch 0    Simethicone (GAS RELIEF 125

## 2025-03-26 ENCOUNTER — TELEPHONE (OUTPATIENT)
Facility: HOSPITAL | Age: 75
End: 2025-03-26

## 2025-04-04 ENCOUNTER — HOSPITAL ENCOUNTER (OUTPATIENT)
Facility: HOSPITAL | Age: 75
Setting detail: RECURRING SERIES
Discharge: HOME | End: 2025-04-07
Attending: ORTHOPAEDIC SURGERY
Payer: MEDICARE

## 2025-04-04 PROCEDURE — 97535 SELF CARE MNGMENT TRAINING: CPT

## 2025-04-04 PROCEDURE — 97530 THERAPEUTIC ACTIVITIES: CPT

## 2025-04-04 PROCEDURE — 97161 PT EVAL LOW COMPLEX 20 MIN: CPT

## 2025-04-04 PROCEDURE — 97110 THERAPEUTIC EXERCISES: CPT

## 2025-04-04 NOTE — PROGRESS NOTES
to demosntrate improved stability with stance phase of gait  Eval:5 sec right, 1-2 sec left with lateral lean     4.  Pt LEFS score will improve to 70  to show improvement in subjective function.  Eval:56    Frequency / Duration: Patient to be seen 2 times per week for 24 treatments    Patient/ Caregiver education and instruction: Diagnosis, prognosis, self care, activity modification, and exercises     [x]  Plan of care has been reviewed with PTA    Insurance: Payor: MEDICARE / Plan: MEDICARE PART A AND B / Product Type: *No Product type* /      Certification Period: 4/4/2025 - 07/03/25     Angy Elizabeth, PT 4/4/2025 9:01 AM    No Physician signature required; Signature on POC no longer required for Medicare as of 1/1/25    Please sign and return to     [facility] at [address], [address], [place]  [address]  Phone: [phone]      Fax:  [phone]

## 2025-04-04 NOTE — PROGRESS NOTES
flexion: to toes   SLS: right 5sec, left -1-2sec      Pain Level (0-10 scale) post treatment: 5    ASSESSMENT/Changes in Function: Pt challenged with feeling HS with bridge with needing to add foam roller. Some increased pain in left hip with sidestepping with band with no glut facilitation on right.     Patient will continue to benefit from skilled PT services to modify and progress therapeutic interventions, analyze and address functional mobility deficits, analyze and address ROM deficits, analyze and address strength deficits, analyze and cue for proper movement patterns, analyze and modify for postural abnormalities, analyze and address imbalance/dizziness, and instruct in home and community integration to address functional deficits and attain remaining goals.       [x]  See Plan of Care for goals and assessment      PLAN  [x]  Upgrade activities as tolerated     [x]  Continue plan of care  []  Update interventions per flow sheet       []  Other:_      Angy Elizabeth, PT 4/4/2025  3:43 PM

## 2025-04-09 ENCOUNTER — HOSPITAL ENCOUNTER (OUTPATIENT)
Facility: HOSPITAL | Age: 75
Setting detail: RECURRING SERIES
Discharge: HOME | End: 2025-04-12
Attending: ORTHOPAEDIC SURGERY
Payer: MEDICARE

## 2025-04-09 PROCEDURE — 97112 NEUROMUSCULAR REEDUCATION: CPT

## 2025-04-09 PROCEDURE — 97530 THERAPEUTIC ACTIVITIES: CPT

## 2025-04-09 PROCEDURE — 97110 THERAPEUTIC EXERCISES: CPT

## 2025-04-09 NOTE — PROGRESS NOTES
PHYSICAL / OCCUPATIONAL THERAPY - DAILY TREATMENT NOTE     Patient Name: Damari Bella    Date: 2025    : 1950  Insurance: Payor: MEDICARE / Plan: MEDICARE PART A AND B / Product Type: *No Product type* /      Patient  verified Yes     Visit #   Current / Total 2 24   Time   In / Out 7:55 8:33   Pain   In / Out 5 5   Subjective Functional Status/Changes: Patient stated she did her HEP.    Changes to:  Allergies, Med Hx, Sx Hx?   no       TREATMENT AREA =  Trochanteric bursitis of left hip [M70.62]  Trochanteric bursitis of right hip [M70.61]    If an interpreting service is utilized for treatment of this patient, the contents of this document represent the material reviewed with the patient via the .     OBJECTIVE    Therapeutic Procedures:  Tx Min Billable or 1:1 Min (if diff from Tx Min) Procedure, Rationale, Specifics   22  18074 Therapeutic Exercise (timed):  increase ROM, strength, coordination, balance, and proprioception to improve patient's ability to progress to PLOF and address remaining functional goals. (see flow sheet as applicable)    Details if applicable:       8  45910 Therapeutic Activity (timed):  use of dynamic activities replicating functional movements to increase ROM, strength, coordination, balance, and proprioception in order to improve patient's ability to progress to PLOF and address remaining functional goals.  (see flow sheet as applicable)    Details if applicable:     8  95988 Neuromuscular Re-Education (timed):  improve balance, coordination, kinesthetic sense, posture, core stability and proprioception to improve patient's ability to develop conscious control of individual muscles and awareness of position of extremities in order to progress to PLOF and address remaining functional goals. (see flow sheet as applicable)     Details if applicable:           Details if applicable:            Details if applicable:     38  [facility] Totals Reminder: bill using

## 2025-04-14 ENCOUNTER — HOSPITAL ENCOUNTER (OUTPATIENT)
Facility: HOSPITAL | Age: 75
Setting detail: RECURRING SERIES
Discharge: HOME | End: 2025-04-17
Attending: ORTHOPAEDIC SURGERY
Payer: MEDICARE

## 2025-04-14 PROCEDURE — 97110 THERAPEUTIC EXERCISES: CPT

## 2025-04-14 PROCEDURE — 97530 THERAPEUTIC ACTIVITIES: CPT

## 2025-04-14 PROCEDURE — 97112 NEUROMUSCULAR REEDUCATION: CPT

## 2025-04-14 NOTE — PROGRESS NOTES
PHYSICAL / OCCUPATIONAL THERAPY - DAILY TREATMENT NOTE     Patient Name: Damari Bella    Date: 2025    : 1950  Insurance: Payor: MEDICARE / Plan: MEDICARE PART A AND B / Product Type: *No Product type* /      Patient  verified Yes     Visit #   Current / Total 3 24   Time   In / Out 1:10 1:50   Pain   In / Out 3 3   Subjective Functional Status/Changes: Pt reports that she has some hip soreness upon arrival today.    Changes to:  Allergies, Med Hx, Sx Hx?   no       TREATMENT AREA =  Trochanteric bursitis of left hip [M70.62]  Trochanteric bursitis of right hip [M70.61]    If an interpreting service is utilized for treatment of this patient, the contents of this document represent the material reviewed with the patient via the .       Tx Min Billable or 1:1 Min (if diff from Tx Min) Procedure, Rationale, Specifics   15  05994 Therapeutic Exercise (timed):  increase ROM, strength, coordination, balance, and proprioception to improve patient's ability to progress to PLOF and address remaining functional goals. (see flow sheet as applicable)    Details if applicable:       15  09939 Therapeutic Activity (timed):  use of dynamic activities replicating functional movements to increase ROM, strength, coordination, balance, and proprioception in order to improve patient's ability to progress to PLOF and address remaining functional goals.  (see flow sheet as applicable)    Details if applicable:     10  96090 Neuromuscular Re-Education (timed):  improve balance, coordination, kinesthetic sense, posture, core stability and proprioception to improve patient's ability to develop conscious control of individual muscles and awareness of position of extremities in order to progress to PLOF and address remaining functional goals. (see flow sheet as applicable)     Details if applicable:           Details if applicable:            Details if applicable:     40  [facility] Totals Reminder: bill using total

## 2025-04-16 ENCOUNTER — APPOINTMENT (OUTPATIENT)
Facility: HOSPITAL | Age: 75
End: 2025-04-16
Attending: ORTHOPAEDIC SURGERY
Payer: MEDICARE

## 2025-04-25 ENCOUNTER — HOSPITAL ENCOUNTER (OUTPATIENT)
Facility: HOSPITAL | Age: 75
Setting detail: RECURRING SERIES
Discharge: HOME | End: 2025-04-28
Attending: ORTHOPAEDIC SURGERY
Payer: MEDICARE

## 2025-04-25 PROCEDURE — 97140 MANUAL THERAPY 1/> REGIONS: CPT

## 2025-04-25 PROCEDURE — 97110 THERAPEUTIC EXERCISES: CPT

## 2025-04-25 PROCEDURE — 97112 NEUROMUSCULAR REEDUCATION: CPT

## 2025-04-25 NOTE — PROGRESS NOTES
PHYSICAL / OCCUPATIONAL THERAPY - DAILY TREATMENT NOTE     Patient Name: Damari Bella    Date: 2025    : 1950  Insurance: Payor: MEDICARE / Plan: MEDICARE PART A AND B / Product Type: *No Product type* /      Patient  verified Yes     Visit #   Current / Total 4 24   Time   In / Out 1111 1151   Pain   In / Out 5 3   Subjective Functional Status/Changes: The pain is less but still there. My left foot started hurting after Harman last night   Changes to:  Allergies, Med Hx, Sx Hx?   no       TREATMENT AREA =  Trochanteric bursitis of left hip [M70.62]  Trochanteric bursitis of right hip [M70.61]    If an interpreting service is utilized for treatment of this patient, the contents of this document represent the material reviewed with the patient via the .     OBJECTIVE      Therapeutic Procedures:  Tx Min Billable or 1:1 Min (if diff from Tx Min) Procedure, Rationale, Specifics   20  78086 Therapeutic Exercise (timed):  increase ROM, strength, coordination, balance, and proprioception to improve patient's ability to progress to PLOF and address remaining functional goals. (see flow sheet as applicable)    Details if applicable:       10  07942 Manual Therapy (timed):  decrease pain, increase ROM, and increase tissue extensibility to improve patient's ability to progress to PLOF and address remaining functional goals.  The manual therapy interventions were performed at a separate and distinct time from the therapeutic activities interventions . Details: STM to left gluts and piriformis     Details if applicable:     10  81900 Neuromuscular Re-Education (timed):  improve balance, coordination, kinesthetic sense, posture, core stability and proprioception to improve patient's ability to develop conscious control of individual muscles and awareness of position of extremities in order to progress to PLOF and address remaining functional goals. (see flow sheet as applicable)     Details if

## 2025-04-29 ENCOUNTER — HOSPITAL ENCOUNTER (OUTPATIENT)
Facility: HOSPITAL | Age: 75
Setting detail: RECURRING SERIES
Discharge: HOME | End: 2025-05-02
Attending: ORTHOPAEDIC SURGERY
Payer: MEDICARE

## 2025-04-29 PROCEDURE — 97112 NEUROMUSCULAR REEDUCATION: CPT

## 2025-04-29 PROCEDURE — 97110 THERAPEUTIC EXERCISES: CPT

## 2025-04-29 PROCEDURE — 97140 MANUAL THERAPY 1/> REGIONS: CPT

## 2025-04-29 PROCEDURE — 97530 THERAPEUTIC ACTIVITIES: CPT

## 2025-04-29 NOTE — PROGRESS NOTES
prophylaxis.  Eval: HEP dispensed    Current on 4/9/25: MET     2. Pt left hip IR ROM will improve to 50* to equal that of right to increase ease with dressing  Eval: 40*  Current: 43* progressing 4/25/25     Long Term Goals: LTG- To be accomplished in 24 visits:  1.  Pt will report max pain 0/10 to complete ADLs with increased ease.   Eval: 8/10 max pain   Current 4/14/2025: 3/10 today     2.  Pt bilateral hip abduction strength will improve to 4/5 without pain to be able to walk without pain  Eval:3/5 left with pain, 3+/5 right   Current: fatigue with hip hike progressing 4/29/25     3.  Pt bilateral SLS will improve to > 10 seconds to demosntrate improved stability with stance phase of gait  Eval:5 sec right, 1-2 sec left with lateral lean   Current: > 10 seconds goal MET 4/25/25     4.  Pt LEFS score will improve to 70  to show improvement in subjective function.  Eval:56    PLAN  Yes  Continue plan of care  []  Upgrade activities as tolerated  []  Discharge due to :  []  Other:    Angy Elizabeth, GERA    4/29/2025    12:45 PM    Future Appointments   Date Time Provider Department Center   5/2/2025  8:30 AM Lis Andrade PT [facility]   5/5/2025  2:40 PM [facility]   5/6/2025  4:30 PM Torrey Parks PT [facility]   5/8/2025  1:10 PM Torrey Parks PT [facility]   5/12/2025  1:10 PM Angy Elizabeth PT [facility]   5/16/2025 11:10 AM Bethany Beck, PT [facility]

## 2025-05-02 ENCOUNTER — TELEPHONE (OUTPATIENT)
Facility: HOSPITAL | Age: 75
End: 2025-05-02

## 2025-05-02 NOTE — TELEPHONE ENCOUNTER
SPOKE W/ SONIA RT- AND STATED HER VINCENT WAS AT 4:30 TODAY SHE SAID IF THERE WAS A LATER VINCENT TO PLEASE GIVE HER A CALL

## 2025-05-05 ENCOUNTER — OFFICE VISIT (OUTPATIENT)
Facility: CLINIC | Age: 75
End: 2025-05-05
Payer: MEDICARE

## 2025-05-05 VITALS
SYSTOLIC BLOOD PRESSURE: 115 MMHG | RESPIRATION RATE: 14 BRPM | HEIGHT: 63 IN | TEMPERATURE: 98 F | BODY MASS INDEX: 23.92 KG/M2 | WEIGHT: 135 LBS | HEART RATE: 88 BPM | OXYGEN SATURATION: 98 % | DIASTOLIC BLOOD PRESSURE: 84 MMHG

## 2025-05-05 DIAGNOSIS — Z00.00 ENCOUNTER FOR INITIAL ANNUAL WELLNESS VISIT (AWV) IN MEDICARE PATIENT: ICD-10-CM

## 2025-05-05 DIAGNOSIS — M15.0 PRIMARY OSTEOARTHRITIS INVOLVING MULTIPLE JOINTS: Primary | ICD-10-CM

## 2025-05-05 PROCEDURE — G8420 CALC BMI NORM PARAMETERS: HCPCS | Performed by: INTERNAL MEDICINE

## 2025-05-05 PROCEDURE — G8427 DOCREV CUR MEDS BY ELIG CLIN: HCPCS | Performed by: INTERNAL MEDICINE

## 2025-05-05 PROCEDURE — 99213 OFFICE O/P EST LOW 20 MIN: CPT | Performed by: INTERNAL MEDICINE

## 2025-05-05 PROCEDURE — 1090F PRES/ABSN URINE INCON ASSESS: CPT | Performed by: INTERNAL MEDICINE

## 2025-05-05 PROCEDURE — 3017F COLORECTAL CA SCREEN DOC REV: CPT | Performed by: INTERNAL MEDICINE

## 2025-05-05 PROCEDURE — 1123F ACP DISCUSS/DSCN MKR DOCD: CPT | Performed by: INTERNAL MEDICINE

## 2025-05-05 PROCEDURE — 1159F MED LIST DOCD IN RCRD: CPT | Performed by: INTERNAL MEDICINE

## 2025-05-05 PROCEDURE — 1036F TOBACCO NON-USER: CPT | Performed by: INTERNAL MEDICINE

## 2025-05-05 PROCEDURE — G0438 PPPS, INITIAL VISIT: HCPCS | Performed by: INTERNAL MEDICINE

## 2025-05-05 PROCEDURE — G8400 PT W/DXA NO RESULTS DOC: HCPCS | Performed by: INTERNAL MEDICINE

## 2025-05-05 PROCEDURE — 1160F RVW MEDS BY RX/DR IN RCRD: CPT | Performed by: INTERNAL MEDICINE

## 2025-05-05 RX ORDER — CLOBETASOL PROPIONATE 0.5 MG/G
CREAM TOPICAL
COMMUNITY
Start: 2025-04-14

## 2025-05-05 ASSESSMENT — VISUAL ACUITY
OD_CC: 20/13
OS_CC: 20/10

## 2025-05-05 ASSESSMENT — PATIENT HEALTH QUESTIONNAIRE - PHQ9
1. LITTLE INTEREST OR PLEASURE IN DOING THINGS: NOT AT ALL
SUM OF ALL RESPONSES TO PHQ QUESTIONS 1-9: 0
SUM OF ALL RESPONSES TO PHQ QUESTIONS 1-9: 0
2. FEELING DOWN, DEPRESSED OR HOPELESS: NOT AT ALL
SUM OF ALL RESPONSES TO PHQ QUESTIONS 1-9: 0
SUM OF ALL RESPONSES TO PHQ QUESTIONS 1-9: 0

## 2025-05-05 ASSESSMENT — ENCOUNTER SYMPTOMS
SHORTNESS OF BREATH: 0
COUGH: 0
ABDOMINAL DISTENTION: 0
ABDOMINAL PAIN: 0
SORE THROAT: 0

## 2025-05-05 NOTE — PROGRESS NOTES
Have you been to the ER, urgent care clinic since your last visit?  Hospitalized since your last visit?   YES - When: approximately 1 months ago.  Where and Why: [facility].    Have you seen or consulted any other health care providers outside our system since your last visit?   NO

## 2025-05-05 NOTE — PROGRESS NOTES
Comprehensive Visit:    Chief Complaint   Patient presents with    Medicare AW              Assessment & Plan  Today's visit is for an annual Medicare wellness visit.  In addition to the above other chronic medical problems are being addressed.    1. Right shoulder blade pain.  - Reports pain in the right shoulder blade area without any known injury or heavy lifting.  - Advised to use the anti-inflammatory medication previously prescribed for foot pain to manage back pain.  - If the pain persists or worsens, further evaluation may be necessary.  - Physical examination included balance and strength tests, which were performed satisfactorily.    2. Hip pain.  - Currently undergoing physical therapy for hip pain, which includes exercises to strengthen the leg and ankle.  - Reports that the therapy is helping, although experienced significant pain after a body pump session.  - Advised to continue with physical therapy sessions as recommended by the orthopedic doctor.  - X-rays were performed by the orthopedic doctor, and physical therapy was recommended based on the results.    3. Blood pressure management.  - Blood pressure readings are within normal limits today at 115/84 mmHg.  - Advised to continue monitoring blood pressure regularly.  - Previous readings were higher, but not concerningly so.    4. Allergies.  - Continues to experience allergies, likely due to pollen.  - No new treatment is required at this time.  - Advised that pollen levels are starting to decrease.    5. Scar under eye.  - Has a scar under the eye and was instructed to wear a Band-Aid for a few days.  - No further treatment required at this time.      ICD-10-CM    1. Primary osteoarthritis involving multiple joints  M15.0            1. Primary osteoarthritis involving multiple joints       No follow-up provider specified.       Subjective:     Damari is a 74 y.o. y.o. female who complains of   Chief Complaint   Patient presents with

## 2025-05-06 ENCOUNTER — HOSPITAL ENCOUNTER (OUTPATIENT)
Facility: HOSPITAL | Age: 75
Setting detail: RECURRING SERIES
Discharge: HOME | End: 2025-05-09
Attending: ORTHOPAEDIC SURGERY
Payer: MEDICARE

## 2025-05-06 PROCEDURE — 97530 THERAPEUTIC ACTIVITIES: CPT

## 2025-05-06 PROCEDURE — 97112 NEUROMUSCULAR REEDUCATION: CPT

## 2025-05-06 PROCEDURE — 97110 THERAPEUTIC EXERCISES: CPT

## 2025-05-06 NOTE — PROGRESS NOTES
PHYSICAL / OCCUPATIONAL THERAPY - DAILY TREATMENT NOTE     Patient Name: Damari Bella    Date: 2025    : 1950  Insurance: Payor: MEDICARE / Plan: MEDICARE PART A AND B / Product Type: *No Product type* /      Patient  verified Yes     Visit #   Current / Total 6 24   Time   In / Out 4:30 5:10   Pain   In / Out 3 3   Subjective Functional Status/Changes: Pt reports that she has some continued lateral left hip pain with palpation and with sleeping on her left side   Changes to:  Allergies, Med Hx, Sx Hx?   no       TREATMENT AREA =  Trochanteric bursitis of left hip [M70.62]  Trochanteric bursitis of right hip [M70.61]    If an interpreting service is utilized for treatment of this patient, the contents of this document represent the material reviewed with the patient via the .       Therapeutic Procedures:  Tx Min Billable or 1:1 Min (if diff from Tx Min) Procedure, Rationale, Specifics   15  38305 Therapeutic Exercise (timed):  increase ROM, strength, coordination, balance, and proprioception to improve patient's ability to progress to PLOF and address remaining functional goals. (see flow sheet as applicable)    Details if applicable:       15  20838 Neuromuscular Re-Education (timed):  improve balance, coordination, kinesthetic sense, posture, core stability and proprioception to improve patient's ability to develop conscious control of individual muscles and awareness of position of extremities in order to progress to PLOF and address remaining functional goals. (see flow sheet as applicable)    Details if applicable:     10  38934 Therapeutic Activity (timed):  use of dynamic activities replicating functional movements to increase ROM, strength, coordination, balance, and proprioception in order to improve patient's ability to progress to PLOF and address remaining functional goals.  (see flow sheet as applicable)     Details if applicable:           Details if applicable:

## 2025-05-08 ENCOUNTER — HOSPITAL ENCOUNTER (OUTPATIENT)
Facility: HOSPITAL | Age: 75
Setting detail: RECURRING SERIES
Discharge: HOME | End: 2025-05-11
Attending: ORTHOPAEDIC SURGERY
Payer: MEDICARE

## 2025-05-08 PROCEDURE — 97530 THERAPEUTIC ACTIVITIES: CPT

## 2025-05-08 PROCEDURE — 97110 THERAPEUTIC EXERCISES: CPT

## 2025-05-08 NOTE — PROGRESS NOTES
PHYSICAL / OCCUPATIONAL THERAPY - DAILY TREATMENT NOTE     Patient Name: Damari Bella    Date: 2025    : 1950  Insurance: Payor: MEDICARE / Plan: MEDICARE PART A AND B / Product Type: *No Product type* /      Patient  verified Yes     Visit #   Current / Total 7 24   Time   In / Out 1:12 1:53   Pain   In / Out 2 2   Subjective Functional Status/Changes: Pt reports that she felt fine after her last session.   Changes to:  Allergies, Med Hx, Sx Hx?   no       TREATMENT AREA =  Trochanteric bursitis of left hip [M70.62]  Trochanteric bursitis of right hip [M70.61]    If an interpreting service is utilized for treatment of this patient, the contents of this document represent the material reviewed with the patient via the .       Therapeutic Procedures:  Tx Min Billable or 1:1 Min (if diff from Tx Min) Procedure, Rationale, Specifics   30  11845 Therapeutic Activity (timed):  use of dynamic activities replicating functional movements to increase ROM, strength, coordination, balance, and proprioception in order to improve patient's ability to progress to PLOF and address remaining functional goals.  (see flow sheet as applicable)    Details if applicable:       11  38198 Therapeutic Exercise (timed):  increase ROM, strength, coordination, balance, and proprioception to improve patient's ability to progress to PLOF and address remaining functional goals. (see flow sheet as applicable)    Details if applicable:            Details if applicable:           Details if applicable:            Details if applicable:     41  [facility] Totals Reminder: bill using total billable min of TIMED therapeutic procedures (example: do not include dry needle or estim unattended, both untimed codes, in totals to left)  8-22 min = 1 unit; 23-37 min = 2 units; 38-52 min = 3 units; 53-67 min = 4 units; 68-82 min = 5 units   Total Total     TOTAL TREATMENT TIME:        41     Charge Capture    [x]  Patient Education

## 2025-05-08 NOTE — PROGRESS NOTES
[facility] [place]  [address]  [place]  [address]  Phone: [phone]      Fax:  [phone]    Progress Note  Patient name: Damari Bella Start of Care: 2025    Referral source: Trell Baeza DO : 1950    Medical Diagnosis: Trochanteric bursitis of left hip  Trochanteric bursitis of right hip   Payor: MEDICARE / Plan: MEDICARE PART A AND B / Product Type: *No Product type* /  Onset Date:3/21/25     Treatment Diagnosis: M25.551  RIGHT HIP PAIN and M25.552  LEFT HIP PAIN    Prior Hospitalization: see medical history Provider#: [id number]   Comorbidities: Musculoskeletal disorders and Other: left ankle sx     Prior Level of Function: working out 5-6x week, walking, sleeping without pain    Reporting Period: 2025-2025    Visits from Start of Care: 7    Missed Visits: 1      If an interpreting service is utilized for treatment of this patient, the contents of this document represent the material reviewed with the patient via the .     Goals/Measure of Progress: To be achieved in 12 weeks:    Short Term Goals: STG- To be accomplished in 12 visits  1.  Pt will be independent with HEP to encourage prophylaxis.  Eval: HEP dispensed    Current on 25: MET     2. Pt left hip IR ROM will improve to 50* to equal that of right to increase ease with dressing  Eval: 40*  Current: 43* progressing 25     Long Term Goals: LTG- To be accomplished in 24 visits:  1.  Pt will report max pain 0/10 to complete ADLs with increased ease.   Eval: 8/10 max pain   Current 2025: 3/10 today     2.  Pt bilateral hip abduction strength will improve to 4/5 without pain to be able to walk without pain  Eval:3/5 left with pain, 3+/5 right   Current: fatigue with hip hike progressing 25     3.  Pt bilateral SLS will improve to > 10 seconds to demosntrate improved stability with stance phase of gait  Eval:5 sec right, 1-2 sec left with lateral lean

## 2025-05-12 ENCOUNTER — HOSPITAL ENCOUNTER (OUTPATIENT)
Facility: HOSPITAL | Age: 75
Setting detail: RECURRING SERIES
Discharge: HOME | End: 2025-05-15
Attending: ORTHOPAEDIC SURGERY
Payer: MEDICARE

## 2025-05-12 PROCEDURE — 97110 THERAPEUTIC EXERCISES: CPT

## 2025-05-12 PROCEDURE — 97530 THERAPEUTIC ACTIVITIES: CPT

## 2025-05-12 PROCEDURE — 97140 MANUAL THERAPY 1/> REGIONS: CPT

## 2025-05-12 NOTE — PROGRESS NOTES
PHYSICAL / OCCUPATIONAL THERAPY - DAILY TREATMENT NOTE     Patient Name: Damari Bella    Date: 2025    : 1950  Insurance: Payor: MEDICARE / Plan: MEDICARE PART A AND B / Product Type: *No Product type* /      Patient  verified Yes     Visit #   Current / Total 8 24   Time   In / Out 111 156   Pain   In / Out 2 2   Subjective Functional Status/Changes: Still turning over when I sleep because of the pain. The hip is a little bit better.    Changes to:  Allergies, Med Hx, Sx Hx?   no       TREATMENT AREA =  Trochanteric bursitis of left hip [M70.62]  Trochanteric bursitis of right hip [M70.61]    If an interpreting service is utilized for treatment of this patient, the contents of this document represent the material reviewed with the patient via the .     OBJECTIVE    Therapeutic Procedures:  Tx Min Billable or 1:1 Min (if diff from Tx Min) Procedure, Rationale, Specifics   17  69211 Therapeutic Exercise (timed):  increase ROM, strength, coordination, balance, and proprioception to improve patient's ability to progress to PLOF and address remaining functional goals. (see flow sheet as applicable)    Details if applicable:       8  21483 Manual Therapy (timed):  decrease pain, increase ROM, and increase tissue extensibility to improve patient's ability to progress to PLOF and address remaining functional goals.  The manual therapy interventions were performed at a separate and distinct time from the therapeutic activities interventions . Details: STM to left piriformis in right s/l     Details if applicable:     10 10 79351 Therapeutic Activity (timed):  use of dynamic activities replicating functional movements to increase ROM, strength, coordination, balance, and proprioception in order to improve patient's ability to progress to PLOF and address remaining functional goals.  (see flow sheet as applicable)     Details if applicable:           Details if applicable:            Details if

## 2025-05-19 ENCOUNTER — HOSPITAL ENCOUNTER (OUTPATIENT)
Facility: HOSPITAL | Age: 75
Setting detail: RECURRING SERIES
Discharge: HOME | End: 2025-05-22
Attending: ORTHOPAEDIC SURGERY
Payer: MEDICARE

## 2025-05-19 PROCEDURE — 97110 THERAPEUTIC EXERCISES: CPT

## 2025-05-19 PROCEDURE — 97140 MANUAL THERAPY 1/> REGIONS: CPT

## 2025-05-19 PROCEDURE — 97112 NEUROMUSCULAR REEDUCATION: CPT

## 2025-05-19 NOTE — PROGRESS NOTES
PHYSICAL / OCCUPATIONAL THERAPY - DAILY TREATMENT NOTE     Patient Name: Damari Bella    Date: 2025    : 1950  Insurance: Payor: MEDICARE / Plan: MEDICARE PART A AND B / Product Type: *No Product type* /      Patient  verified Yes     Visit #   Current / Total 9 24   Time   In / Out 110 152   Pain   In / Out 3 2   Subjective Functional Status/Changes: Pt noted left hip > right upon arrival   Changes to:  Allergies, Med Hx, Sx Hx?   no       TREATMENT AREA =  Trochanteric bursitis of left hip [M70.62]  Trochanteric bursitis of right hip [M70.61]    If an interpreting service is utilized for treatment of this patient, the contents of this document represent the material reviewed with the patient via the .     OBJECTIVE  Therapeutic Procedures:  Tx Min Billable or 1:1 Min (if diff from Tx Min) Procedure, Rationale, Specifics   22  66753 Therapeutic Exercise (timed):  increase ROM, strength, coordination, balance, and proprioception to improve patient's ability to progress to PLOF and address remaining functional goals. (see flow sheet as applicable)    Details if applicable:       10  04584 Neuromuscular Re-Education (timed):  improve balance, coordination, kinesthetic sense, posture, core stability and proprioception to improve patient's ability to develop conscious control of individual muscles and awareness of position of extremities in order to progress to PLOF and address remaining functional goals. (see flow sheet as applicable)    Details if applicable:     10  11683 Manual Therapy (timed):  decrease pain, increase ROM, increase tissue extensibility, and decrease trigger points to improve patient's ability to progress to PLOF and address remaining functional goals.  The manual therapy interventions were performed at a separate and distinct time from the therapeutic activities interventions . Details: STM, TPR to left piriformis and B ITB.  Bilateral hip flexor stretching in S/L

## 2025-05-23 ENCOUNTER — HOSPITAL ENCOUNTER (OUTPATIENT)
Facility: HOSPITAL | Age: 75
Setting detail: RECURRING SERIES
Discharge: HOME | End: 2025-05-26
Attending: ORTHOPAEDIC SURGERY
Payer: MEDICARE

## 2025-05-23 PROCEDURE — 97530 THERAPEUTIC ACTIVITIES: CPT

## 2025-05-23 PROCEDURE — 97140 MANUAL THERAPY 1/> REGIONS: CPT

## 2025-05-23 PROCEDURE — 97110 THERAPEUTIC EXERCISES: CPT

## 2025-05-23 NOTE — OR NURSING
PHYSICAL / OCCUPATIONAL THERAPY - DAILY TREATMENT NOTE     Patient Name: Damari Bella    Date: 2025    : 1950  Insurance: Payor: MEDICARE / Plan: MEDICARE PART A AND B / Product Type: *No Product type* /      Patient  verified Yes     Visit #   Current / Total 10 24   Time   In / Out 1:10 1:51   Pain   In / Out 2 2   Subjective Functional Status/Changes: Pt reports minimal hip pain upon arrival today.   Changes to:  Allergies, Med Hx, Sx Hx?   no       TREATMENT AREA =  Trochanteric bursitis of left hip [M70.62]  Trochanteric bursitis of right hip [M70.61]    If an interpreting service is utilized for treatment of this patient, the contents of this document represent the material reviewed with the patient via the .       Therapeutic Procedures:  Tx Min Billable or 1:1 Min (if diff from Tx Min) Procedure, Rationale, Specifics   15  08899 Therapeutic Exercise (timed):  increase ROM, strength, coordination, balance, and proprioception to improve patient's ability to progress to PLOF and address remaining functional goals. (see flow sheet as applicable)    Details if applicable:       15  36695 Therapeutic Activity (timed):  use of dynamic activities replicating functional movements to increase ROM, strength, coordination, balance, and proprioception in order to improve patient's ability to progress to PLOF and address remaining functional goals.  (see flow sheet as applicable)    Details if applicable:     11  77953 Manual Therapy (timed):  increase ROM and increase tissue extensibility to improve patient's ability to progress to PLOF and address remaining functional goals.  The manual therapy interventions were performed at a separate and distinct time from the therapeutic activities interventions . Details: STM, lumbar PAs      Details if applicable:           Details if applicable:            Details if applicable:     41  [facility] Totals Reminder: bill using total billable min of TIMED

## 2025-05-27 ENCOUNTER — TELEPHONE (OUTPATIENT)
Facility: HOSPITAL | Age: 75
End: 2025-05-27

## 2025-05-27 ENCOUNTER — APPOINTMENT (OUTPATIENT)
Facility: HOSPITAL | Age: 75
End: 2025-05-27
Attending: ORTHOPAEDIC SURGERY
Payer: MEDICARE

## 2025-05-28 ENCOUNTER — HOSPITAL ENCOUNTER (OUTPATIENT)
Facility: HOSPITAL | Age: 75
Setting detail: RECURRING SERIES
Discharge: HOME | End: 2025-05-31
Attending: ORTHOPAEDIC SURGERY
Payer: MEDICARE

## 2025-05-28 PROCEDURE — 97112 NEUROMUSCULAR REEDUCATION: CPT

## 2025-05-28 PROCEDURE — 97530 THERAPEUTIC ACTIVITIES: CPT

## 2025-05-28 PROCEDURE — 97110 THERAPEUTIC EXERCISES: CPT

## 2025-05-28 NOTE — PROGRESS NOTES
PHYSICAL / OCCUPATIONAL THERAPY - DAILY TREATMENT NOTE     Patient Name: Damari Bella    Date: 2025    : 1950  Insurance: Payor: MEDICARE / Plan: MEDICARE PART A AND B / Product Type: *No Product type* /      Patient  verified Yes     Visit #   Current / Total 11 24   Time   In / Out 7:55 8:40   Pain   In / Out 2 2   Subjective Functional Status/Changes: Pt reports that she has minimal hip soreness upon arrival today due to it being early in the day.   Changes to:  Allergies, Med Hx, Sx Hx?   no       TREATMENT AREA =  Trochanteric bursitis of left hip [M70.62]  Trochanteric bursitis of right hip [M70.61]    If an interpreting service is utilized for treatment of this patient, the contents of this document represent the material reviewed with the patient via the .       Therapeutic Procedures:  Tx Min Billable or 1:1 Min (if diff from Tx Min) Procedure, Rationale, Specifics   15  36078 Therapeutic Exercise (timed):  increase ROM, strength, coordination, balance, and proprioception to improve patient's ability to progress to PLOF and address remaining functional goals. (see flow sheet as applicable)    Details if applicable:       15  21676 Therapeutic Activity (timed):  use of dynamic activities replicating functional movements to increase ROM, strength, coordination, balance, and proprioception in order to improve patient's ability to progress to PLOF and address remaining functional goals.  (see flow sheet as applicable)    Details if applicable:     15  50238 Neuromuscular Re-Education (timed):  improve balance, coordination, kinesthetic sense, posture, core stability and proprioception to improve patient's ability to develop conscious control of individual muscles and awareness of position of extremities in order to progress to PLOF and address remaining functional goals. (see flow sheet as applicable)     Details if applicable:           Details if applicable:            Details if

## 2025-05-30 ENCOUNTER — HOSPITAL ENCOUNTER (OUTPATIENT)
Facility: HOSPITAL | Age: 75
Setting detail: RECURRING SERIES
End: 2025-05-30
Attending: ORTHOPAEDIC SURGERY
Payer: MEDICARE

## 2025-05-30 PROCEDURE — 97530 THERAPEUTIC ACTIVITIES: CPT

## 2025-05-30 PROCEDURE — 97110 THERAPEUTIC EXERCISES: CPT

## 2025-05-30 PROCEDURE — 97112 NEUROMUSCULAR REEDUCATION: CPT

## 2025-05-30 NOTE — PROGRESS NOTES
PHYSICAL / OCCUPATIONAL THERAPY - DAILY TREATMENT NOTE     Patient Name: Damari Bella    Date: 2025    : 1950  Insurance: Payor: MEDICARE / Plan: MEDICARE PART A AND B / Product Type: *No Product type* /      Patient  verified Yes     Visit #   Current / Total 12 24   Time   In / Out 8:33 9:13   Pain   In / Out 2 2   Subjective Functional Status/Changes: Pt reports some mild hip pain upon arrival today.   Changes to:  Allergies, Med Hx, Sx Hx?   no       TREATMENT AREA =  Trochanteric bursitis of left hip [M70.62]  Trochanteric bursitis of right hip [M70.61]    If an interpreting service is utilized for treatment of this patient, the contents of this document represent the material reviewed with the patient via the .       Therapeutic Procedures:  Tx Min Billable or 1:1 Min (if diff from Tx Min) Procedure, Rationale, Specifics   15  21062 Therapeutic Exercise (timed):  increase ROM, strength, coordination, balance, and proprioception to improve patient's ability to progress to PLOF and address remaining functional goals. (see flow sheet as applicable)    Details if applicable:       15  44769 Neuromuscular Re-Education (timed):  improve balance, coordination, kinesthetic sense, posture, core stability and proprioception to improve patient's ability to develop conscious control of individual muscles and awareness of position of extremities in order to progress to PLOF and address remaining functional goals. (see flow sheet as applicable)    Details if applicable:     10  72135 Therapeutic Activity (timed):  use of dynamic activities replicating functional movements to increase ROM, strength, coordination, balance, and proprioception in order to improve patient's ability to progress to PLOF and address remaining functional goals.  (see flow sheet as applicable)     Details if applicable:           Details if applicable:            Details if applicable:     40  [facility] Totals Reminder: bill

## 2025-06-04 ENCOUNTER — HOSPITAL ENCOUNTER (OUTPATIENT)
Facility: HOSPITAL | Age: 75
Setting detail: RECURRING SERIES
Discharge: HOME | End: 2025-06-07
Attending: ORTHOPAEDIC SURGERY
Payer: MEDICARE

## 2025-06-04 PROCEDURE — 97140 MANUAL THERAPY 1/> REGIONS: CPT

## 2025-06-04 PROCEDURE — 97110 THERAPEUTIC EXERCISES: CPT

## 2025-06-04 NOTE — PROGRESS NOTES
PHYSICAL / OCCUPATIONAL THERAPY - DAILY TREATMENT NOTE     Patient Name: Damari Bella    Date: 2025    : 1950  Insurance: Payor: MEDICARE / Plan: MEDICARE PART A AND B / Product Type: *No Product type* /      Patient  verified Yes     Visit #   Current / Total 13 24   Time   In / Out 835 9:15   Pain   In / Out 2 2   Subjective Functional Status/Changes: Pt reports that she continues to have some anterior right hip pain that is not caused by any movement/exercise in particular   Changes to:  Allergies, Med Hx, Sx Hx?   no       TREATMENT AREA =  Trochanteric bursitis of left hip [M70.62]  Trochanteric bursitis of right hip [M70.61]    If an interpreting service is utilized for treatment of this patient, the contents of this document represent the material reviewed with the patient via the .     OBJECTIVE    Therapeutic Procedures:  Tx Min Billable or 1:1 Min (if diff from Tx Min) Procedure, Rationale, Specifics   30  26075 Therapeutic Exercise (timed):  increase ROM, strength, coordination, balance, and proprioception to improve patient's ability to progress to PLOF and address remaining functional goals. (see flow sheet as applicable)    Details if applicable:       10  70579 Therapeutic Activity (timed):  use of dynamic activities replicating functional movements to increase ROM, strength, coordination, balance, and proprioception in order to improve patient's ability to progress to PLOF and address remaining functional goals.  (see flow sheet as applicable)    Details if applicable:            Details if applicable:           Details if applicable:            Details if applicable:     40  [facility] Totals Reminder: bill using total billable min of TIMED therapeutic procedures (example: do not include dry needle or estim unattended, both untimed codes, in totals to left)  8-22 min = 1 unit; 23-37 min = 2 units; 38-52 min = 3 units; 53-67 min = 4 units; 68-82 min = 5 units   Total Total

## 2025-06-06 ENCOUNTER — HOSPITAL ENCOUNTER (OUTPATIENT)
Facility: HOSPITAL | Age: 75
Setting detail: RECURRING SERIES
Discharge: HOME | End: 2025-06-09
Attending: ORTHOPAEDIC SURGERY
Payer: MEDICARE

## 2025-06-06 PROCEDURE — 97530 THERAPEUTIC ACTIVITIES: CPT

## 2025-06-06 PROCEDURE — 97112 NEUROMUSCULAR REEDUCATION: CPT

## 2025-06-06 PROCEDURE — 97110 THERAPEUTIC EXERCISES: CPT

## 2025-06-06 NOTE — PROGRESS NOTES
[facility] [place]  [address]  [place]  [address]  Phone: [phone]      Fax:  [phone]    Progress Note  Patient name: Damari Bella Start of Care: 2025    Referral source: Trell Baeza DO : 1950    Medical Diagnosis: Trochanteric bursitis of left hip  Trochanteric bursitis of right hip   Payor: MEDICARE / Plan: MEDICARE PART A AND B / Product Type: *No Product type* /  Onset Date:  3/21/25     Treatment Diagnosis: M25.551 RIGHT HIP PAIN and M25.552 LEFT HIP PAIN    Prior Hospitalization: see medical history Provider#: [id number]   Comorbidities: Musculoskeletal disorders and Other: left ankle sx   Prior Level of Function: working out 5-6x week, walking, sleeping without pain     Reporting Period: 25-25    Visits from Start of Care: 14    Missed Visits: 2    If an interpreting service is utilized for treatment of this patient, the contents of this document represent the material reviewed with the patient via the .     Goals/Measure of Progress: To be achieved in 24 visits:  Short Term Goals: STG- To be accomplished in 12 visits  1.  Pt will be independent with HEP to encourage prophylaxis.  Eval: HEP dispensed    Current on 25: MET     2. Pt left hip IR ROM will improve to 50* to equal that of right to increase ease with dressing  Eval: 40*  PN on 25: not met:  right: 33deg, left: 38deg      Long Term Goals: LTG- To be accomplished in 24 visits:  1.  Pt will report max pain 0/10 to complete ADLs with increased ease.   Eval: 8/10 max pain   PN on 2025: Not met: Max pain level: 4/10 pain in hip      2.  Pt bilateral hip abduction strength will improve to 4/5 without pain to be able to walk without pain  Eval:3/5 left with pain, 3+/5 right   PN on 25: Progressing: Hip abduction strength: right:4/5  left: 4-/5     3.  Pt bilateral SLS will improve to > 10 seconds to demosntrate improved stability with 
accomplished in 24 visits:  1.  Pt will report max pain 0/10 to complete ADLs with increased ease.   Eval: 8/10 max pain   PN 5/30/2025: 2/10 today  Current 6/6/2025: Not met: Max pain level: 4/10 pain in hip     2.  Pt bilateral hip abduction strength will improve to 4/5 without pain to be able to walk without pain  Eval:3/5 left with pain, 3+/5 right   PN 5/8/2025: 4/5 hip ABD bilaterally, progressing tolerated progressed therex with decreased overall pain post 5/19/25    Current on 6/6/25: Progressing: Hip abduction strength: right:4/5  left: 4-/5    3.  Pt bilateral SLS will improve to > 10 seconds to demosntrate improved stability with stance phase of gait  Eval:5 sec right, 1-2 sec left with lateral lean   PN 5/8/2025: right - 20 secs, left - 8 secs before requiring UE support, progressing    Current on 6/6/25: MET: SLS On floor with EO: left: 30sec, right: 29sec   4.  Pt LEFS score will improve to 70  to show improvement in subjective function.  Eval:56  PN 5/8/2025: 60/80, MET       PLAN  Yes  Continue plan of care  []  Upgrade activities as tolerated  []  Discharge due to :  []  Other:    Lis Andrade PT    6/6/2025    11:14 AM    Future Appointments   Date Time Provider Department Center   6/9/2025  9:10 AM Lis Andrade PT [facility]   6/11/2025  8:30 AM [facility]   9/5/2025  1:20 PM John Harris MD [facility]

## 2025-06-09 ENCOUNTER — HOSPITAL ENCOUNTER (OUTPATIENT)
Facility: HOSPITAL | Age: 75
Setting detail: RECURRING SERIES
Discharge: HOME | End: 2025-06-12
Attending: ORTHOPAEDIC SURGERY
Payer: MEDICARE

## 2025-06-09 PROCEDURE — 97112 NEUROMUSCULAR REEDUCATION: CPT

## 2025-06-09 PROCEDURE — 97530 THERAPEUTIC ACTIVITIES: CPT

## 2025-06-09 PROCEDURE — 97110 THERAPEUTIC EXERCISES: CPT

## 2025-06-09 NOTE — PROGRESS NOTES
PHYSICAL / OCCUPATIONAL THERAPY - DAILY TREATMENT NOTE     Patient Name: Damari Bella    Date: 2025    : 1950  Insurance: Payor: MEDICARE / Plan: MEDICARE PART A AND B / Product Type: *No Product type* /      Patient  verified Yes     Visit #   Current / Total 15 24   Time   In / Out 9:17 9:55   Pain   In / Out 0 1-2   Subjective Functional Status/Changes: Patient reported no pain in hips right now.     Patient arrived 7 minutes late   Changes to:  Allergies, Med Hx, Sx Hx?   no       TREATMENT AREA =  Trochanteric bursitis of left hip [M70.62]  Trochanteric bursitis of right hip [M70.61]    If an interpreting service is utilized for treatment of this patient, the contents of this document represent the material reviewed with the patient via the .     OBJECTIVE    Therapeutic Procedures:  Tx Min Billable or 1:1 Min (if diff from Tx Min) Procedure, Rationale, Specifics   16  01827 Therapeutic Exercise (timed):  increase ROM, strength, coordination, balance, and proprioception to improve patient's ability to progress to PLOF and address remaining functional goals. (see flow sheet as applicable)    Details if applicable:       12  61535 Neuromuscular Re-Education (timed):  improve balance, coordination, kinesthetic sense, posture, core stability and proprioception to improve patient's ability to develop conscious control of individual muscles and awareness of position of extremities in order to progress to PLOF and address remaining functional goals. (see flow sheet as applicable)    Details if applicable:     10  70374 Therapeutic Activity (timed):  use of dynamic activities replicating functional movements to increase ROM, strength, coordination, balance, and proprioception in order to improve patient's ability to progress to PLOF and address remaining functional goals.  (see flow sheet as applicable)     Details if applicable:           Details if applicable:            Details if

## 2025-06-11 ENCOUNTER — HOSPITAL ENCOUNTER (OUTPATIENT)
Facility: HOSPITAL | Age: 75
Setting detail: RECURRING SERIES
Discharge: HOME | End: 2025-06-14
Attending: ORTHOPAEDIC SURGERY
Payer: MEDICARE

## 2025-06-11 PROCEDURE — 97112 NEUROMUSCULAR REEDUCATION: CPT

## 2025-06-11 PROCEDURE — 97110 THERAPEUTIC EXERCISES: CPT

## 2025-06-11 PROCEDURE — 97530 THERAPEUTIC ACTIVITIES: CPT

## 2025-06-11 NOTE — PROGRESS NOTES
report max pain 0/10 to complete ADLs with increased ease.   Eval: 8/10 max pain   PN on 6/6/2025: Not met: Max pain level: 4/10 pain in hip    Current on 6/9/25: Progressing: max pain level 3-4/10      2.  Pt bilateral hip abduction strength will improve to 4/5 without pain to be able to walk without pain  Eval:3/5 left with pain, 3+/5 right   PN on 6/6/25: Progressing: Hip abduction strength: right:4/5  left: 4-/5     3.  Pt bilateral SLS will improve to > 10 seconds to demosntrate improved stability with stance phase of gait  Eval:5 sec right, 1-2 sec left with lateral lean   PN on 6/6/25: MET: SLS On floor with EO: left: 30sec, right: 29sec   4.  Pt LEFS score will improve to 70  to show improvement in subjective function.  Eval:56  PN 5/8/2025: 60/80, MET    PLAN  Yes  Continue plan of care  []  Upgrade activities as tolerated  []  Discharge due to :  []  Other:    Lis Andrade, PT    6/11/2025    8:38 AM    Future Appointments   Date Time Provider Department Center   9/5/2025  1:20 PM John Harris MD [facility]

## 2025-06-18 ENCOUNTER — HOSPITAL ENCOUNTER (OUTPATIENT)
Facility: HOSPITAL | Age: 75
Setting detail: RECURRING SERIES
Discharge: HOME | End: 2025-06-21
Attending: ORTHOPAEDIC SURGERY
Payer: MEDICARE

## 2025-06-18 PROCEDURE — 97110 THERAPEUTIC EXERCISES: CPT

## 2025-06-18 PROCEDURE — 97112 NEUROMUSCULAR REEDUCATION: CPT

## 2025-06-18 PROCEDURE — 97530 THERAPEUTIC ACTIVITIES: CPT

## 2025-06-18 NOTE — PROGRESS NOTES
PHYSICAL / OCCUPATIONAL THERAPY - DAILY TREATMENT NOTE     Patient Name: Damari Bella    Date: 2025    : 1950  Insurance: Payor: MEDICARE / Plan: MEDICARE PART A AND B / Product Type: *No Product type* /      Patient  verified Yes     Visit #   Current / Total 17 24   Time   In / Out 9:12 10:00   Pain   In / Out 2 0   Subjective Functional Status/Changes: \"A little stiff this morning.\"   Changes to:  Allergies, Med Hx, Sx Hx?   no       TREATMENT AREA =  Trochanteric bursitis of left hip [M70.62]  Trochanteric bursitis of right hip [M70.61]    If an interpreting service is utilized for treatment of this patient, the contents of this document represent the material reviewed with the patient via the .     OBJECTIVE  Therapeutic Procedures:  Tx Min Billable or 1:1 Min (if diff from Tx Min) Procedure, Rationale, Specifics   20  36597 Therapeutic Exercise (timed):  increase ROM, strength, coordination, balance, and proprioception to improve patient's ability to progress to PLOF and address remaining functional goals. (see flow sheet as applicable)    Details if applicable:       18  79048 Neuromuscular Re-Education (timed):  improve balance, coordination, kinesthetic sense, posture, core stability and proprioception to improve patient's ability to develop conscious control of individual muscles and awareness of position of extremities in order to progress to PLOF and address remaining functional goals. (see flow sheet as applicable)    Details if applicable:     10  57851 Therapeutic Activity (timed):  use of dynamic activities replicating functional movements to increase ROM, strength, coordination, balance, and proprioception in order to improve patient's ability to progress to PLOF and address remaining functional goals.  (see flow sheet as applicable)     Details if applicable:           Details if applicable:            Details if applicable:     48  [facility] Totals Reminder: bill using

## 2025-07-07 ENCOUNTER — TELEPHONE (OUTPATIENT)
Facility: HOSPITAL | Age: 75
End: 2025-07-07

## 2025-08-21 ENCOUNTER — OFFICE VISIT (OUTPATIENT)
Age: 75
End: 2025-08-21

## 2025-08-21 DIAGNOSIS — M21.42 BILATERAL PES PLANUS: ICD-10-CM

## 2025-08-21 DIAGNOSIS — M79.671 INTRACTABLE RIGHT HEEL PAIN: Primary | ICD-10-CM

## 2025-08-21 DIAGNOSIS — M21.41 BILATERAL PES PLANUS: ICD-10-CM

## 2025-08-21 DIAGNOSIS — M25.571 ACUTE RIGHT ANKLE PAIN: ICD-10-CM

## 2025-08-21 DIAGNOSIS — M84.371A STRESS FRACTURE OF RIGHT ANKLE, INITIAL ENCOUNTER: ICD-10-CM

## 2025-08-21 RX ORDER — ACETAMINOPHEN 500 MG
500 TABLET ORAL 2 TIMES DAILY PRN
Qty: 60 TABLET | Refills: 0 | Status: SHIPPED | OUTPATIENT
Start: 2025-08-21

## 2025-09-03 ENCOUNTER — HOSPITAL ENCOUNTER (OUTPATIENT)
Age: 75
Discharge: HOME | End: 2025-09-06
Payer: MEDICARE

## 2025-09-03 DIAGNOSIS — M79.671 INTRACTABLE RIGHT HEEL PAIN: ICD-10-CM

## 2025-09-03 PROCEDURE — 73721 MRI JNT OF LWR EXTRE W/O DYE: CPT
